# Patient Record
Sex: FEMALE | Race: WHITE | Employment: FULL TIME | URBAN - METROPOLITAN AREA
[De-identification: names, ages, dates, MRNs, and addresses within clinical notes are randomized per-mention and may not be internally consistent; named-entity substitution may affect disease eponyms.]

---

## 2017-02-15 ENCOUNTER — PRE VISIT (OUTPATIENT)
Dept: CARDIOLOGY | Facility: CLINIC | Age: 47
End: 2017-02-15

## 2017-02-22 ENCOUNTER — OFFICE VISIT (OUTPATIENT)
Dept: CARDIOLOGY | Facility: CLINIC | Age: 47
End: 2017-02-22
Attending: INTERNAL MEDICINE
Payer: COMMERCIAL

## 2017-02-22 VITALS
BODY MASS INDEX: 50.02 KG/M2 | WEIGHT: 293 LBS | HEART RATE: 74 BPM | HEIGHT: 64 IN | SYSTOLIC BLOOD PRESSURE: 139 MMHG | DIASTOLIC BLOOD PRESSURE: 79 MMHG

## 2017-02-22 DIAGNOSIS — R00.2 PALPITATIONS: ICD-10-CM

## 2017-02-22 DIAGNOSIS — I10 BENIGN ESSENTIAL HYPERTENSION: ICD-10-CM

## 2017-02-22 DIAGNOSIS — I48.92 ATRIAL FLUTTER, UNSPECIFIED TYPE (H): Primary | ICD-10-CM

## 2017-02-22 PROCEDURE — 93000 ELECTROCARDIOGRAM COMPLETE: CPT | Performed by: INTERNAL MEDICINE

## 2017-02-22 PROCEDURE — 99214 OFFICE O/P EST MOD 30 MIN: CPT | Performed by: INTERNAL MEDICINE

## 2017-02-22 RX ORDER — LEVOTHYROXINE SODIUM 200 UG/1
200 TABLET ORAL DAILY
COMMUNITY
End: 2022-01-12

## 2017-02-22 NOTE — LETTER
2/22/2017    Srikanth Morris MD  [facility]  [address] [place] [address]    RE: Makenzie Blanco       Dear Colleague,    I had the pleasure of seeing Makenzie Blanco in the [facility].    REASON FOR VISIT:  Evaluation of paroxysmal AFib/flutter.      Ms. Mirza is a delightful 47-year-old lady with a history of hypertension, obstructive sleep apnea, morbid obesity and paroxysmal atrial fibrillation/flutter who is here for routine evaluation.      The patient has a family history of hypertrophic cardiomyopathy (her mother, who is actually my patient).  She had an MRI which revealed asymmetric hypertrophy, however no signs of hypertrophic cardiomyopathy.  In 05/2016, she presented with palpitations and was found to have atypical flutter.  The episode terminated after 5 days.      I last saw her in 06/2016.  We started her on atenolol as well as Eliquis.  She was found to have sleep apnea and started on CPAP.      She informs that since last year, she has not had any recurrence of atrial arrhythmia.  She denies any symptoms during this visit such as chest pain, shortness of breath, lightheadedness, near-syncope or syncopal episode.  She seems to be compliant to CPAP machine.      Echocardiogram obtained in 04/2016 showed asymmetric LVH, however, no LVOT obstruction, EF of 60%-65%.  Cardiac MRI revealed a maximal wall thickness of 1.3 cm, and there were no other features compatible with hypertrophic cardiomyopathy.      EKG done here today shows normal sinus rhythm.      Outpatient Encounter Prescriptions as of 2/22/2017   Medication Sig Dispense Refill     levothyroxine (SYNTHROID) 200 MCG tablet Take 200 mcg by mouth daily       atenolol (TENORMIN) 50 MG tablet Take 1.5 tablets (75 mg) by mouth daily (Patient taking differently: Take 50 mg by mouth daily ) 135 tablet 1     apixaban ANTICOAGULANT (ELIQUIS) 5 MG tablet Take 1 tablet (5 mg) by mouth 2  times daily 180 tablet 3     triamterene-hydrochlorothiazide (MAXZIDE-25) 37.5-25 MG per tablet Take 1 tablet by mouth daily       [DISCONTINUED] levothyroxine (SYNTHROID, LEVOTHROID) 112 MCG tablet Take 200 mcg by mouth daily        No facility-administered encounter medications on file as of 2/22/2017.      ASSESSMENT AND PLAN:   1.  Paroxysmal atrial fibrillation/flutter.  Responded well to atenolol.  Blood pressure is well controlled.  Will continue current medical therapy.   2.  Embolic prevention.  CHADS-VASc score of 2.  Continue Eliquis.   3.  Family history of HCM.  MRI did not show any features compatible with hypertrophic cardiomyopathy.   4.  Followup care.  Follow up in clinic with me in a year or earlier as needed.  At that time, we will repeat an echo.     Again, thank you for allowing me to participate in the care of your patient.      Sincerely,    Az Johnson MD     [facility]

## 2017-02-22 NOTE — PROGRESS NOTES
REASON FOR VISIT:  Evaluation of paroxysmal AFib/flutter.      HISTORY OF PRESENT ILLNESS:  Ms. Mirza is a delightful 47-year-old lady with a history of hypertension, obstructive sleep apnea, morbid obesity and paroxysmal atrial fibrillation/flutter who is here for routine evaluation.      The patient has a family history of hypertrophic cardiomyopathy (her mother, who is actually my patient).  She had an MRI which revealed asymmetric hypertrophy, however no signs of hypertrophic cardiomyopathy.  In 05/2016, she presented with palpitations and was found to have atypical flutter.  The episode terminated after 5 days.      I last saw her in 06/2016.  We started her on atenolol as well as Eliquis.  She was found to have sleep apnea and started on CPAP.      She informs that since last year, she has not had any recurrence of atrial arrhythmia.  She denies any symptoms during this visit such as chest pain, shortness of breath, lightheadedness, near-syncope or syncopal episode.  She seems to be compliant to CPAP machine.      Echocardiogram obtained in 04/2016 showed asymmetric LVH, however, no LVOT obstruction, EF of 60%-65%.  Cardiac MRI revealed a maximal wall thickness of 1.3 cm, and there were no other features compatible with hypertrophic cardiomyopathy.      EKG done here today shows normal sinus rhythm.      ASSESSMENT AND PLAN:   1.  Paroxysmal atrial fibrillation/flutter.  Responded well to atenolol.  Blood pressure is well controlled.  Will continue current medical therapy.   2.  Embolic prevention.  CHADS-VASc score of 2.  Continue Eliquis.   3.  Family history of HCM.  MRI did not show any features compatible with hypertrophic cardiomyopathy.   4.  Followup care.  Follow up in clinic with me in a year or earlier as needed.  At that time, we will repeat an echo.         CESAR CAI MD             D: 02/22/2017 09:56   T: 02/22/2017 11:44   MT: MARIA G      Name:     PHILIPP ALLEN   MRN:       [id number]        Account:      [id number]   :      1970           Service Date: 2017      Document: [id number]

## 2017-02-22 NOTE — MR AVS SNAPSHOT
After Visit Summary   2/22/2017    Makenzie Blanco    MRN: [id number]           Patient Information     Date Of Birth          1970        Visit Information        Provider Department      2/22/2017 9:15 AM Az Johnson MD [facility] [place]        Today's Diagnoses     Atrial flutter, unspecified type (H)    -  1    Palpitations        Benign essential hypertension           Follow-ups after your visit        Additional Services     Follow-Up with Electrophysiologist                 Future tests that were ordered for you today     Open Future Orders        Priority Expected Expires Ordered    EKG 12-lead complete w/read - Clinics (to be scheduled) Routine 2/22/2018 3/29/2018 2/22/2017    Echocardiogram Routine 2/22/2018 3/29/2018 2/22/2017    Follow-Up with Electrophysiologist Routine 2/22/2018 7/7/2018 2/22/2017            Who to contact     If you have questions or need follow up information about today's clinic visit or your schedule please contact [facility] directly at [phone].  Normal or non-critical lab and imaging results will be communicated to you by [employer], letter or phone within 4 business days after the clinic has received the results. If you do not hear from us within 7 days, please contact the clinic through [employer] or phone. If you have a critical or abnormal lab result, we will notify you by phone as soon as possible.  Submit refill requests through [employer] or call your pharmacy and they will forward the refill request to us. Please allow 3 business days for your refill to be completed.          Additional Information About Your Visit        MyChart Information     [employer] gives you secure access to your electronic health record. If you see a primary care provider, you can also send messages to your care team and make appointments. If you have questions, please call your primary care  "clinic.  If you do not have a primary care provider, please call [phone] and they will assist you.        Care EveryWhere ID     This is your Care EveryWhere ID. This could be used by other organizations to access your [place] medical records  [id number]        Your Vitals Were     Pulse Height BMI (Body Mass Index)             74 1.613 m (5' 3.5\") 69.31 kg/m2          Blood Pressure from Last 3 Encounters:   02/22/17 139/79   06/23/16 138/84   05/25/16 130/70    Weight from Last 3 Encounters:   02/22/17 (!) 180.3 kg (397 lb 8 oz)   06/23/16 (!) 176.9 kg (389 lb 14.4 oz)   05/25/16 (!) 173.3 kg (382 lb)              We Performed the Following     EKG 12-lead complete w/read - Clinics     Follow-Up with Electrophysiologist          Today's Medication Changes          These changes are accurate as of: 2/22/17  9:57 AM.  If you have any questions, ask your nurse or doctor.               These medicines have changed or have updated prescriptions.        Dose/Directions    atenolol 50 MG tablet   Commonly known as:  TENORMIN   This may have changed:  how much to take   Used for:  Atrial flutter, unspecified type (H)        Dose:  75 mg   Take 1.5 tablets (75 mg) by mouth daily   Quantity:  135 tablet   Refills:  1                Primary Care Provider Office Phone # Fax #    Srikanth Morris [phone]       [facility] [address]  [facility] [address]        Thank you!     Thank you for choosing [facility] [place]  for your care. Our goal is always to provide you with excellent care. Hearing back from our patients is one way we can continue to improve our services. Please take a few minutes to complete the written survey that you may receive in the mail after your visit with us. Thank you!             Your Updated Medication List - Protect others around you: Learn how to safely use, store and throw away your medicines at " www.disposemymeds.org.          This list is accurate as of: 2/22/17  9:57 AM.  Always use your most recent med list.                   Brand Name Dispense Instructions for use    apixaban ANTICOAGULANT 5 MG tablet    ELIQUIS    180 tablet    Take 1 tablet (5 mg) by mouth 2 times daily       atenolol 50 MG tablet    TENORMIN    135 tablet    Take 1.5 tablets (75 mg) by mouth daily       SYNTHROID 200 MCG tablet   Generic drug:  levothyroxine      Take 200 mcg by mouth daily       triamterene-hydrochlorothiazide 37.5-25 MG per tablet    MAXZIDE-25     Take 1 tablet by mouth daily

## 2017-02-22 NOTE — PROGRESS NOTES
"HPI and Plan:   See dictation  [id number]    Physical Exam:  Vitals: /79 (BP Location: Other (Comment), Patient Position: Chair, Cuff Size: Adult Large)  Pulse 74  Ht 1.613 m (5' 3.5\")  Wt (!) 180.3 kg (397 lb 8 oz)  BMI 69.31 kg/m2    Constitutional:  AAO x3.  Pt is in NAD.  HEAD: normocephalic.  SKIN: Skin normal color, texture and turgor with no lesions or eruptions.  Eyes: PERRL, EOMI.  ENT:  Supple, normal JVP. No lymphadenopathy or thyroid enlargement.  Chest:  CTAB.  Cardiac:   RRR, normal  S1 and S2.  No murmurs rubs or gallop.    Abdomen:  Normal BS.  Soft, non-tender and non-distended.  No rebound or guarding.    Extremities:  Pedious pulses palpable B/L.  No LE edema noticed.   Neurological: Strength and sensation grossly symmetric and intact throughout.       CURRENT MEDICATIONS:  Current Outpatient Prescriptions   Medication Sig Dispense Refill     levothyroxine (SYNTHROID) 200 MCG tablet Take 200 mcg by mouth daily       atenolol (TENORMIN) 50 MG tablet Take 1.5 tablets (75 mg) by mouth daily (Patient taking differently: Take 50 mg by mouth daily ) 135 tablet 1     apixaban ANTICOAGULANT (ELIQUIS) 5 MG tablet Take 1 tablet (5 mg) by mouth 2 times daily 180 tablet 3     triamterene-hydrochlorothiazide (MAXZIDE-25) 37.5-25 MG per tablet Take 1 tablet by mouth daily       [DISCONTINUED] levothyroxine (SYNTHROID, LEVOTHROID) 112 MCG tablet Take 200 mcg by mouth daily          ALLERGIES   No Known Allergies    PAST MEDICAL HISTORY:  Past Medical History   Diagnosis Date     Anxiety      Atrial flutter (H)      On Holter 5/24/2016     Cardiomyopathy (H)      Chronic ear infection      HTN (hypertension)      Hypothyroid      LVH (left ventricular hypertrophy)      Morbid obesity (H)      MILTON (obstructive sleep apnea)      CPAP     Palpitations        PAST SURGICAL HISTORY:  No past surgical history on file.    FAMILY HISTORY:  Family History   Problem Relation Age of Onset     Hypertension Mother  "       SOCIAL HISTORY:  Social History     Social History     Marital status:      Spouse name: N/A     Number of children: N/A     Years of education: N/A     Social History Main Topics     Smoking status: Never Smoker     Smokeless tobacco: Never Used     Alcohol use 0.0 oz/week     0 Standard drinks or equivalent per week      Comment: occasional      Drug use: No     Sexual activity: Not on file     Other Topics Concern     Caffeine Concern Yes     1 cups of coffee a day      Special Diet No     Exercise No     Social History Narrative       Review of Systems:  Skin:  Positive for rash   Eyes:  Positive for glasses  ENT:  Negative    Respiratory:  Positive for dyspnea on exertion;sleep apnea;CPAP  Cardiovascular:    edema;Positive for  Gastroenterology: Negative    Genitourinary:  Negative    Musculoskeletal:  Positive for back pain;arthritis;joint pain;nocturnal cramping  Neurologic:  Negative    Psychiatric:  Positive for anxiety  Heme/Lymph/Imm:  Negative    Endocrine:  Positive for thyroid disorder      Recent Lab Results:  LIPID RESULTS:  Lab Results   Component Value Date    CHOL 160 03/30/2015    HDL 52 03/30/2015    LDL 88 03/30/2015    TRIG 100 03/30/2015       LIVER ENZYME RESULTS:  Lab Results   Component Value Date    AST 18 12/16/2015    ALT 26 12/16/2015       CBC RESULTS:  Lab Results   Component Value Date    HGB 12.3 05/05/2006       BMP RESULTS:  Lab Results   Component Value Date     12/16/2015    POTASSIUM 5.1 12/16/2015    CHLORIDE 103 12/16/2015    GLC 96 12/16/2015    BUN 13 12/16/2015    CR 0.8 12/16/2015    GERMAN 9.1 12/16/2015        A1C RESULTS:  No results found for: A1C    INR RESULTS:  No results found for: INR      ECHOCARDIOGRAM  Recent Results (from the past 8760 hour(s))   Echocardiogram Limited    Narrative    Interpretation Summary                    [facility]  Echocardiography Laboratory  [address] [place] [address]        Name:  PHILIPP ALLEN  MRN: [id number]  : 1970  Study Date: 2016 09:05 AM  Age: 46 yrs  Gender: Female  Patient Location: [facility] [place]  Reason For Study:     Ordering Physician: AFTAB PARKER  Referring Physician: Srikanth Morris  Performed By: Amalia Ceballos RDCS     BSA: 2.6 m2  Height: 63 in  Weight: 385 lb  HR: 72  BP: 124/76 mmHg  ______________________________________________________________________________        Procedure  Limited Echo Adult.  ______________________________________________________________________________     Interpretation Summary     Compared to prior study, there is no significant change.  ______________________________________________________________________________           Left Ventricle  The left ventricle is normal in size. Left ventricular hypertrophy:  asymmetric with no LVOT obstruction. The visual ejection fraction is  estimated at 60-65%.     Right Ventricle  The right ventricle is normal in structure, function and size. The right  ventricular systolic function is normal.  Atria  Normal left atrial size. Right atrial size is normal.     Mitral Valve  The mitral valve leaflets appear normal. There is no evidence of stenosis,  fluttering, or prolapse.     Tricuspid Valve  Normal tricuspid valve. Right ventricular systolic pressure could not be  approximated due to inadequate tricuspid regurgitation.     Aortic Valve  Normal tricuspid aortic valve.     Pulmonic Valve  The pulmonic valve is not well visualized.     Vessels  The aortic root is normal size.  Pericardium  There is no pericardial effusion.     Rhythm  The rhythm was normal sinus.     ______________________________________________________________________________                    ______________________________________________________________________________        Report approved by: Nir Solo 2016 01:58 PM      ECHO COMPLETE WITH OPTISON    Narrative    Interpretation Summary                     [facility]  Echocardiography Laboratory  [address]  [place] [address]        Name: PHILIPP ALLEN  MRN: [id number]  : 1970  Study Date: 2016 09:05 AM  Age: 46 yrs  Gender: Female  [place] [facility]  Reason For Study: Essential (primary) hypertension  Ordering Physician: AFTAB PARKER  Referring Physician: Srikanth Morris  Performed By: Mildred Jacob,     BSA: 2.5 m2  Height: 63 in  Weight: 375 lb  HR: 83  BP: 140/70 mmHg  ______________________________________________________________________________        Procedure  Complete Echo Adult. Contrast Optison.  ______________________________________________________________________________     Interpretation Summary     The visual ejection fraction is estimated at 60-65%.  The transmitral spectral Doppler flow pattern is normal for age.  Left ventricular hypertrophy: asymmetric with no LVOT obstruction  The study was technically adequate. Contrast was used without apparent  complications. There is no comparison study available.  ______________________________________________________________________________           Left Ventricle  The left ventricle is normal in size. Left ventricular hypertrophy:  asymmetric with no LVOT obstruction. The visual ejection fraction is  estimated at 60-65%. The transmitral spectral Doppler flow pattern is normal  for age.     Right Ventricle  The right ventricle is normal in structure, function and size.  Atria  Normal left atrial size. Right atrial size is normal.     Mitral Valve  The mitral valve leaflets appear normal. There is no evidence of stenosis,  fluttering, or prolapse.     Tricuspid Valve  The tricuspid valve is not well visualized, but is grossly normal.     Aortic Valve  Normal tricuspid aortic valve.     Pulmonic Valve  The pulmonic valve is not well visualized. There is no pulmonic valvular  stenosis.     Vessels  The aortic root is normal size. Normal size  ascending aorta. The inferior  vena cava is not dilated.  ______________________________________________________________________________     MMode/2D Measurements & Calculations  IVSd: 1.4 cm  LVIDd: 5.1 cm  LVIDs: 3.0 cm  LVPWd: 1.1 cm  FS: 42.0 %  EDV(Teich): 123.5 ml  ESV(Teich): 33.7 ml  LV mass(C)d: 247.8 grams  Ao root diam: 3.2 cm  LA dimension: 3.6 cm  asc Aorta Diam: 3.1 cm  LA/Ao: 1.1  LA Volume (BP): 29.0 ml  LA Volume Index (BP): 11.5 ml/m2           Doppler Measurements & Calculations  MV E max genny: 121.9 cm/sec  MV A max genny: 69.6 cm/sec  MV E/A: 1.8  MV dec slope: 665.9 cm/sec2  PA acc time: 0.09 sec  Lateral E/e': 9.2  Medial E/e': 8.4           ______________________________________________________________________________        Report approved by: Nir Solo 02/24/2016 10:46 AM            No orders of the defined types were placed in this encounter.    Orders Placed This Encounter   Medications     levothyroxine (SYNTHROID) 200 MCG tablet     Sig: Take 200 mcg by mouth daily     Medications Discontinued During This Encounter   Medication Reason     levothyroxine (SYNTHROID, LEVOTHROID) 112 MCG tablet Medication Reconciliation Clean Up         Encounter Diagnoses   Name Primary?     Atrial flutter, unspecified type (H)      Palpitations      Benign essential hypertension          CC  Srikanth Morris MD  [facility]  [address] [place] [address]

## 2017-06-10 ENCOUNTER — HEALTH MAINTENANCE LETTER (OUTPATIENT)
Age: 47
End: 2017-06-10

## 2017-06-21 DIAGNOSIS — I48.92 ATRIAL FLUTTER, UNSPECIFIED TYPE (H): ICD-10-CM

## 2017-06-21 RX ORDER — ATENOLOL 50 MG/1
75 TABLET ORAL DAILY
Qty: 135 TABLET | Refills: 3 | Status: ON HOLD | OUTPATIENT
Start: 2017-06-21 | End: 2017-12-11

## 2017-09-07 DIAGNOSIS — I10 BENIGN ESSENTIAL HYPERTENSION: ICD-10-CM

## 2017-09-07 DIAGNOSIS — I48.92 ATRIAL FLUTTER, UNSPECIFIED TYPE (H): ICD-10-CM

## 2017-09-07 DIAGNOSIS — R00.2 PALPITATIONS: ICD-10-CM

## 2017-12-11 ENCOUNTER — APPOINTMENT (OUTPATIENT)
Dept: ULTRASOUND IMAGING | Facility: CLINIC | Age: 47
End: 2017-12-11
Attending: EMERGENCY MEDICINE
Payer: COMMERCIAL

## 2017-12-11 ENCOUNTER — HOSPITAL ENCOUNTER (OUTPATIENT)
Facility: CLINIC | Age: 47
Setting detail: OBSERVATION
Discharge: HOME | End: 2017-12-11
Attending: EMERGENCY MEDICINE | Admitting: INTERNAL MEDICINE
Payer: COMMERCIAL

## 2017-12-11 VITALS
RESPIRATION RATE: 18 BRPM | HEART RATE: 76 BPM | BODY MASS INDEX: 48.82 KG/M2 | DIASTOLIC BLOOD PRESSURE: 73 MMHG | WEIGHT: 293 LBS | SYSTOLIC BLOOD PRESSURE: 135 MMHG | TEMPERATURE: 97.9 F | HEIGHT: 65 IN | OXYGEN SATURATION: 94 %

## 2017-12-11 DIAGNOSIS — I48.92 ATRIAL FLUTTER, UNSPECIFIED TYPE (H): ICD-10-CM

## 2017-12-11 DIAGNOSIS — I10 BENIGN ESSENTIAL HYPERTENSION: ICD-10-CM

## 2017-12-11 DIAGNOSIS — R00.2 PALPITATIONS: ICD-10-CM

## 2017-12-11 DIAGNOSIS — K80.20 SYMPTOMATIC CHOLELITHIASIS: ICD-10-CM

## 2017-12-11 PROBLEM — R10.9 ABDOMINAL PAIN: Status: ACTIVE | Noted: 2017-12-11

## 2017-12-11 LAB
ALBUMIN SERPL-MCNC: 3.4 G/DL (ref 3.4–5)
ALP SERPL-CCNC: 88 U/L (ref 40–150)
ALT SERPL W P-5'-P-CCNC: 21 U/L (ref 0–50)
ANION GAP SERPL CALCULATED.3IONS-SCNC: 8 MMOL/L (ref 3–14)
AST SERPL W P-5'-P-CCNC: 15 U/L (ref 0–45)
BASOPHILS # BLD AUTO: 0.1 10E9/L (ref 0–0.2)
BASOPHILS NFR BLD AUTO: 0.5 %
BILIRUB SERPL-MCNC: 0.3 MG/DL (ref 0.2–1.3)
BUN SERPL-MCNC: 14 MG/DL (ref 7–30)
CALCIUM SERPL-MCNC: 9.4 MG/DL (ref 8.5–10.1)
CHLORIDE SERPL-SCNC: 102 MMOL/L (ref 94–109)
CO2 SERPL-SCNC: 24 MMOL/L (ref 20–32)
CREAT SERPL-MCNC: 0.7 MG/DL (ref 0.52–1.04)
DIFFERENTIAL METHOD BLD: ABNORMAL
EOSINOPHIL # BLD AUTO: 0.1 10E9/L (ref 0–0.7)
EOSINOPHIL NFR BLD AUTO: 0.5 %
ERYTHROCYTE [DISTWIDTH] IN BLOOD BY AUTOMATED COUNT: 14.3 % (ref 10–15)
GFR SERPL CREATININE-BSD FRML MDRD: 90 ML/MIN/1.7M2
GLUCOSE SERPL-MCNC: 126 MG/DL (ref 70–99)
HCT VFR BLD AUTO: 39.7 % (ref 35–47)
HGB BLD-MCNC: 13 G/DL (ref 11.7–15.7)
IMM GRANULOCYTES # BLD: 0.1 10E9/L (ref 0–0.4)
IMM GRANULOCYTES NFR BLD: 0.6 %
INR PPP: 1.29 (ref 0.86–1.14)
LIPASE SERPL-CCNC: 142 U/L (ref 73–393)
LYMPHOCYTES # BLD AUTO: 1.8 10E9/L (ref 0.8–5.3)
LYMPHOCYTES NFR BLD AUTO: 14.6 %
MCH RBC QN AUTO: 26.5 PG (ref 26.5–33)
MCHC RBC AUTO-ENTMCNC: 32.7 G/DL (ref 31.5–36.5)
MCV RBC AUTO: 81 FL (ref 78–100)
MONOCYTES # BLD AUTO: 0.7 10E9/L (ref 0–1.3)
MONOCYTES NFR BLD AUTO: 5.3 %
NEUTROPHILS # BLD AUTO: 9.5 10E9/L (ref 1.6–8.3)
NEUTROPHILS NFR BLD AUTO: 78.5 %
NRBC # BLD AUTO: 0 10*3/UL
NRBC BLD AUTO-RTO: 0 /100
PLATELET # BLD AUTO: 397 10E9/L (ref 150–450)
POTASSIUM SERPL-SCNC: 3.8 MMOL/L (ref 3.4–5.3)
PROT SERPL-MCNC: 8.4 G/DL (ref 6.8–8.8)
RBC # BLD AUTO: 4.9 10E12/L (ref 3.8–5.2)
SODIUM SERPL-SCNC: 134 MMOL/L (ref 133–144)
WBC # BLD AUTO: 12.2 10E9/L (ref 4–11)

## 2017-12-11 PROCEDURE — 25000132 ZZH RX MED GY IP 250 OP 250 PS 637: Performed by: HOSPITALIST

## 2017-12-11 PROCEDURE — 96374 THER/PROPH/DIAG INJ IV PUSH: CPT

## 2017-12-11 PROCEDURE — 96376 TX/PRO/DX INJ SAME DRUG ADON: CPT

## 2017-12-11 PROCEDURE — 99235 HOSP IP/OBS SAME DATE MOD 70: CPT | Performed by: INTERNAL MEDICINE

## 2017-12-11 PROCEDURE — 85025 COMPLETE CBC W/AUTO DIFF WBC: CPT | Performed by: EMERGENCY MEDICINE

## 2017-12-11 PROCEDURE — G0378 HOSPITAL OBSERVATION PER HR: HCPCS

## 2017-12-11 PROCEDURE — 99285 EMERGENCY DEPT VISIT HI MDM: CPT | Mod: 25

## 2017-12-11 PROCEDURE — 25000128 H RX IP 250 OP 636: Performed by: EMERGENCY MEDICINE

## 2017-12-11 PROCEDURE — 80053 COMPREHEN METABOLIC PANEL: CPT | Performed by: EMERGENCY MEDICINE

## 2017-12-11 PROCEDURE — 83690 ASSAY OF LIPASE: CPT | Performed by: EMERGENCY MEDICINE

## 2017-12-11 PROCEDURE — 96375 TX/PRO/DX INJ NEW DRUG ADDON: CPT

## 2017-12-11 PROCEDURE — 25000132 ZZH RX MED GY IP 250 OP 250 PS 637: Performed by: INTERNAL MEDICINE

## 2017-12-11 PROCEDURE — 85610 PROTHROMBIN TIME: CPT | Performed by: EMERGENCY MEDICINE

## 2017-12-11 PROCEDURE — 25000128 H RX IP 250 OP 636

## 2017-12-11 PROCEDURE — 76705 ECHO EXAM OF ABDOMEN: CPT

## 2017-12-11 PROCEDURE — 99203 OFFICE O/P NEW LOW 30 MIN: CPT | Performed by: SURGERY

## 2017-12-11 PROCEDURE — 25000128 H RX IP 250 OP 636: Performed by: INTERNAL MEDICINE

## 2017-12-11 RX ORDER — TRIAMTERENE/HYDROCHLOROTHIAZID 37.5-25 MG
1 TABLET ORAL DAILY
COMMUNITY
End: 2020-06-08

## 2017-12-11 RX ORDER — NALOXONE HYDROCHLORIDE 0.4 MG/ML
.1-.4 INJECTION, SOLUTION INTRAMUSCULAR; INTRAVENOUS; SUBCUTANEOUS
Status: DISCONTINUED | OUTPATIENT
Start: 2017-12-11 | End: 2017-12-11 | Stop reason: HOSPADM

## 2017-12-11 RX ORDER — AMOXICILLIN 250 MG
1 CAPSULE ORAL 2 TIMES DAILY PRN
Status: DISCONTINUED | OUTPATIENT
Start: 2017-12-11 | End: 2017-12-11 | Stop reason: HOSPADM

## 2017-12-11 RX ORDER — HYDROMORPHONE HYDROCHLORIDE 4 MG/1
4 TABLET ORAL EVERY 4 HOURS PRN
Qty: 12 TABLET | Refills: 0 | Status: SHIPPED | OUTPATIENT
Start: 2017-12-11 | End: 2018-09-05

## 2017-12-11 RX ORDER — MORPHINE SULFATE 4 MG/ML
4 INJECTION, SOLUTION INTRAMUSCULAR; INTRAVENOUS ONCE
Status: COMPLETED | OUTPATIENT
Start: 2017-12-11 | End: 2017-12-11

## 2017-12-11 RX ORDER — DOCUSATE SODIUM 100 MG/1
100 CAPSULE, LIQUID FILLED ORAL 2 TIMES DAILY
Status: DISCONTINUED | OUTPATIENT
Start: 2017-12-11 | End: 2017-12-11 | Stop reason: HOSPADM

## 2017-12-11 RX ORDER — OXYCODONE HYDROCHLORIDE 5 MG/1
5-10 TABLET ORAL
Status: DISCONTINUED | OUTPATIENT
Start: 2017-12-11 | End: 2017-12-11 | Stop reason: HOSPADM

## 2017-12-11 RX ORDER — ATENOLOL 25 MG/1
75 TABLET ORAL DAILY
Status: DISCONTINUED | OUTPATIENT
Start: 2017-12-11 | End: 2017-12-11

## 2017-12-11 RX ORDER — ONDANSETRON 4 MG/1
4 TABLET, ORALLY DISINTEGRATING ORAL EVERY 6 HOURS PRN
Status: DISCONTINUED | OUTPATIENT
Start: 2017-12-11 | End: 2017-12-11 | Stop reason: HOSPADM

## 2017-12-11 RX ORDER — AMOXICILLIN 250 MG
2 CAPSULE ORAL 2 TIMES DAILY PRN
Status: DISCONTINUED | OUTPATIENT
Start: 2017-12-11 | End: 2017-12-11 | Stop reason: HOSPADM

## 2017-12-11 RX ORDER — ONDANSETRON 2 MG/ML
INJECTION INTRAMUSCULAR; INTRAVENOUS
Status: COMPLETED
Start: 2017-12-11 | End: 2017-12-11

## 2017-12-11 RX ORDER — ONDANSETRON 2 MG/ML
4 INJECTION INTRAMUSCULAR; INTRAVENOUS EVERY 6 HOURS PRN
Status: DISCONTINUED | OUTPATIENT
Start: 2017-12-11 | End: 2017-12-11 | Stop reason: HOSPADM

## 2017-12-11 RX ORDER — ATENOLOL 50 MG/1
50 TABLET ORAL DAILY
COMMUNITY
End: 2018-07-03

## 2017-12-11 RX ORDER — LEVOTHYROXINE SODIUM 100 UG/1
200 TABLET ORAL DAILY
Status: DISCONTINUED | OUTPATIENT
Start: 2017-12-11 | End: 2017-12-11 | Stop reason: HOSPADM

## 2017-12-11 RX ORDER — ACETAMINOPHEN 650 MG/1
650 SUPPOSITORY RECTAL EVERY 4 HOURS PRN
Status: DISCONTINUED | OUTPATIENT
Start: 2017-12-11 | End: 2017-12-11 | Stop reason: HOSPADM

## 2017-12-11 RX ORDER — HYDROMORPHONE HYDROCHLORIDE 2 MG/1
2-4 TABLET ORAL
Status: DISCONTINUED | OUTPATIENT
Start: 2017-12-11 | End: 2017-12-11 | Stop reason: HOSPADM

## 2017-12-11 RX ORDER — ATENOLOL 25 MG/1
50 TABLET ORAL DAILY
Status: DISCONTINUED | OUTPATIENT
Start: 2017-12-11 | End: 2017-12-11 | Stop reason: HOSPADM

## 2017-12-11 RX ORDER — ACETAMINOPHEN 325 MG/1
650 TABLET ORAL EVERY 4 HOURS PRN
Status: DISCONTINUED | OUTPATIENT
Start: 2017-12-11 | End: 2017-12-11 | Stop reason: HOSPADM

## 2017-12-11 RX ORDER — AMPICILLIN AND SULBACTAM 2; 1 G/1; G/1
3 INJECTION, POWDER, FOR SOLUTION INTRAMUSCULAR; INTRAVENOUS EVERY 6 HOURS
Status: DISCONTINUED | OUTPATIENT
Start: 2017-12-11 | End: 2017-12-11 | Stop reason: HOSPADM

## 2017-12-11 RX ORDER — HYDROMORPHONE HYDROCHLORIDE 1 MG/ML
0.3 INJECTION, SOLUTION INTRAMUSCULAR; INTRAVENOUS; SUBCUTANEOUS
Status: DISCONTINUED | OUTPATIENT
Start: 2017-12-11 | End: 2017-12-11 | Stop reason: HOSPADM

## 2017-12-11 RX ORDER — ONDANSETRON 2 MG/ML
4 INJECTION INTRAMUSCULAR; INTRAVENOUS ONCE
Status: COMPLETED | OUTPATIENT
Start: 2017-12-11 | End: 2017-12-11

## 2017-12-11 RX ADMIN — MORPHINE SULFATE 4 MG: 4 INJECTION, SOLUTION INTRAMUSCULAR; INTRAVENOUS at 04:10

## 2017-12-11 RX ADMIN — ONDANSETRON 4 MG: 2 INJECTION INTRAMUSCULAR; INTRAVENOUS at 05:40

## 2017-12-11 RX ADMIN — LEVOTHYROXINE SODIUM 200 MCG: 100 TABLET ORAL at 09:02

## 2017-12-11 RX ADMIN — AMPICILLIN SODIUM AND SULBACTAM SODIUM 3 G: 2; 1 INJECTION, POWDER, FOR SOLUTION INTRAMUSCULAR; INTRAVENOUS at 12:37

## 2017-12-11 RX ADMIN — ATENOLOL 50 MG: 25 TABLET ORAL at 09:50

## 2017-12-11 RX ADMIN — MORPHINE SULFATE 4 MG: 4 INJECTION INTRAVENOUS at 05:39

## 2017-12-11 RX ADMIN — ONDANSETRON 4 MG: 2 INJECTION INTRAMUSCULAR; INTRAVENOUS at 08:47

## 2017-12-11 RX ADMIN — AMPICILLIN SODIUM AND SULBACTAM SODIUM 3 G: 2; 1 INJECTION, POWDER, FOR SOLUTION INTRAMUSCULAR; INTRAVENOUS at 07:32

## 2017-12-11 RX ADMIN — HYDROMORPHONE HYDROCHLORIDE 2 MG: 2 TABLET ORAL at 14:19

## 2017-12-11 RX ADMIN — HYDROMORPHONE HYDROCHLORIDE 2 MG: 2 TABLET ORAL at 18:35

## 2017-12-11 RX ADMIN — Medication 0.3 MG: at 09:10

## 2017-12-11 ASSESSMENT — ENCOUNTER SYMPTOMS
NERVOUS/ANXIOUS: 1
NAUSEA: 0
BACK PAIN: 1
ABDOMINAL PAIN: 1

## 2017-12-11 ASSESSMENT — PAIN DESCRIPTION - DESCRIPTORS: DESCRIPTORS: ACHING

## 2017-12-11 NOTE — ED NOTES
Went to bed  Middle back pain  This was at 10 pm  c pap on   Has been up and down all night felt full and bloated last night  After having pizza and ice cream  Pain mid back around to front mostly on the right  Side   Here for eval

## 2017-12-11 NOTE — ED PROVIDER NOTES
History     Chief Complaint:  Abdominal Pain and Back Pain    HPI   Makenzie Blanco is a 47 year old female on Eliquis for atrial flutter who presents to the emergency department today for evaluation of abdominal pain and back pain. The patient reports that at 1730 last night, 12/01/2017, she had pizza for dinner and then had ice cream for desert. She then felt very full and bloated and then developed some indigestion and abdominal pain. The patient reports that she took Tagamet but this did not relieve her pain. When the patient laid down to go to bed at 2200 she experienced a stabbing radiation of her abdominal pain to her back by way of her right upper quadrant. With her discomfort she was unable to sleep satisfactorily so she decided to come here to the emergency department. Here in the emergency department the patient reports that she still has abdominal pain that is located mostly on the right side. She also still has stabbing back pain but denies any nausea or chest pain. The patient notes that she is anxious. She did not have any alcohol last night.     Allergies:  No Known Drug Allergies     Medications:    Eliquis  Tenormin  Levothyroxine     Past Medical History:    Anxiety  Atrial flutter    Cardiomyopathy    Chronic ear infection   HTN (hypertension)   Hypothyroid   LVH (left ventricular hypertrophy)   Morbid obesity   MILTON (obstructive sleep apnea)   Palpitations    Past Surgical History:    History reviewed. No pertinent surgical history.    Family History:    Mother: Hypertension, Hepatitis C    Social History:  Smoking Status: Never Smoker  Smokeless Tobacco: Never Used  Alcohol Use: Positive  Marital Status:       Review of Systems   Cardiovascular: Negative for chest pain.   Gastrointestinal: Positive for abdominal pain. Negative for nausea.   Musculoskeletal: Positive for back pain.   Psychiatric/Behavioral: The patient is nervous/anxious.    All other systems reviewed and are  "negative.    Physical Exam     Patient Vitals for the past 24 hrs:   BP Temp Temp src Pulse Resp SpO2 Height Weight   12/11/17 0458 - - - - - 93 % - -   12/11/17 0457 146/66 - - - - - - -   12/11/17 0308 171/81 98  F (36.7  C) Temporal 80 17 97 % 1.651 m (5' 5\") (!) 167.8 kg (370 lb)     Physical Exam  Constitutional: Patient appears well-developed and well-nourished. Patient is in mild distress  HENT:                         Head: No external signs of trauma noted.                        Eyes: Conjunctivae are normal. Pupils are equal, round, and reactive to light.   Cardiovascular:                         Normal rate, regular rhythm and normal heart sounds.                          Exam reveals no friction rub.                          No murmur heard.  Pulmonary/Chest:                         Effort normal and breath sounds normal.                         No respiratory distress.                         There are no wheezes.                         There are no rales.   Abdominal:                         Soft.                         Bowel sounds normal.                         There is no distension.                         There is no RLQ, LLQ, or LUQ tenderness. There is mild RUQ and epigastric TTP                        There is no rebound or guarding.   Musculoskeletal:                         Normal range of motion.                         Normal Tone                        No CVA tenderness  Neurological: Patient is alert and oriented to person, place, and time.   Skin: Skin is warm and dry. Patient is not diaphoretic. No abdominal or flank bruising noted.    Emergency Department Course     Imaging:  Radiology findings were communicated with the patient who voiced understanding of the findings.    Abdomen US, limited (RUQ only)  1. Cholelithiasis without sonographic evidence of cholecystitis.  2. Fatty infiltration of the liver.  Reading per radiology    Laboratory:  Laboratory findings were communicated " with the patient who voiced understanding of the findings.    CBC: WBC 12.2 (H), HGB 13.0,   CMP: Glucose 126 (H) o/w WNL (Creatinine 0.70)  Lipase: 142     Interventions:    Medications   morphine (PF) injection 4 mg (4 mg Intravenous Given 12/11/17 0410)   morphine (PF) injection 4 mg (4 mg Intravenous Given 12/11/17 0539)   ondansetron (ZOFRAN) injection 4 mg (4 mg Intravenous Given 12/11/17 0540)     Emergency Department Course:    0313 Nursing notes and vitals reviewed.    0335 I performed an exam of the patient as documented above.     0343 IV was inserted and blood was drawn for laboratory testing, results above.     0432 The patient was sent for a Abdomen US, limited (RUQ only) while in the emergency department, results above.      0532 I spoke with Dr. Walsh of general surgery regarding patient's presentation, findings, and plan of care.     0543 I spoke with Dr. Valera of the hospitalist service who will admit the patient for further medical care.    0545 I personally reviewed the laboratory and imaging results with the patient and answered all related questions prior to admission.    Impression & Plan      Medical Decision Making:  Makenzie Blanco is a 47 year old female who presents to the emergency department today for evaluation of abdominal pain. Please see the HPI and exam for specifics. Given the patient's description of her symptoms and location, this would fit with something gastrointestinal. She describes epigastric pain and burning that radiated to her back around the way of the right upper quadrant. Her lab work was normal aside from a slight elevation in her WBC count. An ultrasound demonstrated cholelithiasis without cholecystitis. The patient still describes RUQ pain, even though there is only minimal pressure on repeat exam. I discussed the case with general surgery who states that the patient should be off Eliquis for at least 24 hours before surgery would be considered. I  discussed the case with the hospitalist who accepts the patient for admission.       Diagnosis:    ICD-10-CM    1. Symptomatic cholelithiasis K80.20      Disposition:   Admitted      Scribe Disclosure:  I, Michael Haynes, am serving as a scribe at 3:22 AM on 12/11/2017 to document services personally performed by Bernabe Cedillo DO, based on my observations and the provider's statements to me.    [facility] EMERGENCY DEPARTMENT       Bernabe Cedillo DO  12/11/17 0544

## 2017-12-11 NOTE — CONSULTS
PRIMARY CARE PHYSICIAN:  Srikanth Morris MD      REASON FOR CONSULTATION:  Biliary colic.      HISTORY OF PRESENT ILLNESS:  Makenzie Blanco is a 47-year-old female, with no antecedent history of biliary tract disease, who came to the ER overnight with abdominal pain.  She states that she had some mid back pain while trying to sleep, and was not able to relieve this with activity or adjustments in the bed.  She had some mild indigestion as well, and took some antacids without relief of her symptoms.  Because of the persistence of pain, which eventually went to the epigastrium and right upper quadrant, the patient sought evaluation in the ER.      Here, she was found to have a mild leukocytosis, and an ultrasound which showed gallstones without signs of cholecystitis.  This morning, the patient states that her pain is a little bit better, but not completely absent.  She denies any jaundice or icterus or diarrhea during the course of this illness.  She is on chronic anticoagulation for atrial fibrillation, she has been on this for a year, and does not have any specific sequelae of her atrial fibrillation, i.e., no thromboembolic events.      PAST MEDICAL AND SURGICAL HISTORY:  Anxiety, atrial flutter, cardiomyopathy, hypertension, hypothyroidism, morbid obesity, obstructive sleep apnea.  She has had no previous abdominal surgeries.  She has had a prior tonsillectomy, and D&C.      CURRENT MEDICATIONS:  At the time of admission, the patient was on Eliquis, atenolol, levothyroxine.  The patient is also on hydrochlorothiazide, per report.      ALLERGIES:  The patient has no recognized drug allergies.      SOCIAL HISTORY:  The patient is .  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  She is not a smoker.      PHYSICAL EXAM:   VITAL SIGNS:  Ms. Blanco is afebrile; temperature this morning is 96.8.  Pulse is 76 with blood pressure of 159/66, respiratory rate is 18 with 94% saturations on room air.   GENERAL:   She is generally alert, appropriate, nontoxic-appearing overall.   HEENT:  She has no appreciable scleral icterus or jaundice.   HEART:  Sounds are currently regular without murmurs.   LUNGS:  Breath sounds are without wheezes or crackles.   ABDOMEN:  Soft, without distension.  She has some mild epigastric tenderness and right upper quadrant tenderness, but no Raza sign on deep inspiration.      LABORATORY EXAMS:  Notable for white blood cell count of 12.2 thousand, hemoglobin 13.0.  LFTs are all normal, as is her lipase on admission.  INR is 1.29.      IMAGING:  I personally reviewed the patient's ultrasound from the ER; this showed gallstones without wall thickening or edema, and there is no duct dilation.      IMPRESSION AND RECOMMENDATIONS:  This is a 47-year-old female with newly diagnosed symptomatic cholelithiasis without overt suggestion of cholecystitis.  The patient feels slightly better this morning, and given the fact she is on Eliquis, she is not an appropriate surgical candidate at this moment in time.  I have offered cholecystectomy in the near future, but after referring to prescribing guidelines, I think it would be safest for the patient to be clear of her Eliquis for at least 48 hours prior to surgery.  This would put us at Wednesday of this week.  I will place her on a clear liquid diet, hopefully she can return home, and again continue to hold her Eliquis until the scheduled surgery.  We will discuss timing with our surgery scheduler this morning regarding the actual timing of surgery.      Thank you very much for this consultation.         JOSE RAFAEL ALVAREZ MD             D: 2017 08:04   T: 2017 08:31   MT: [id number]      Name:     PHILIPP ALLEN   MRN:      [id number]        Account:       [id number]   :      1970           Consult Date:  2017      Document: [id number]       cc: Srikanth Morris MD

## 2017-12-11 NOTE — PHARMACY-ADMISSION MEDICATION HISTORY
Admission medication history interview status for this patient is complete. See [facility] admission navigator for allergy information, prior to admission medications and immunization status.     Medication history interview source(s):Patient  Medication history resources (including written lists, pill bottles, clinic record):None  Primary pharmacy: [facility] #[id number] - [place], [place] - [address] [address].    Changes made to PTA medication list:  Added: triamterene/HCTZ  Deleted: none  Changed: atenolol dose (75 mg --> 50 mg)    Actions taken by pharmacist (provider contacted, etc):None   Additional medication history information: patient has Rx for atenolol 75 mg, but is currently taking only 50 mg  Medication reconciliation/reorder completed by provider prior to medication history? No  Do you take OTC medications (eg tylenol, ibuprofen, fish oil, eye/ear drops, etc)? No    Prior to Admission medications    Medication Sig Last Dose Taking? Auth Provider   triamterene-hydrochlorothiazide (MAXZIDE-25) 37.5-25 MG per tablet Take 1 tablet by mouth daily 12/10/2017 at 08:00 Yes Unknown, Entered By History   apixaban ANTICOAGULANT (ELIQUIS) 5 MG tablet Take 1 tablet (5 mg) by mouth 2 times daily 12/10/2017 at 22:00 Yes Az Johnson MD   atenolol (TENORMIN) 50 MG tablet Take 1 tablet by mouth daily 12/10/2017 at 08:00 Yes Az Johnson MD   levothyroxine (SYNTHROID) 200 MCG tablet Take 200 mcg by mouth daily 12/10/2017 at 08:00 Yes Reported, Patient

## 2017-12-11 NOTE — PLAN OF CARE
Problem: Patient Care Overview  Goal: Plan of Care/Patient Progress Review  PRIMARY DIAGNOSIS: ACUTE PAIN  OUTPATIENT/OBSERVATION GOALS TO BE MET BEFORE DISCHARGE:  1. Pain Status: continues to have pain, rating 4/10    2. Return to near baseline physical activity: YES    3. Cleared for discharge by consultants (if involved): YES    Discharge Planner Nurse   Safe discharge environment identified: YES  Barriers to discharge: NO       Entered by: Denise Shafer 12/11/2017 1:23 PM     Please review provider order for any additional goals.   Nurse to notify provider when observation goals have been met and patient is ready for discharge.

## 2017-12-11 NOTE — DISCHARGE SUMMARY
[facility]    Discharge Summary  Hospitalist    Date of Admission:  12/11/2017  Date of Discharge:  12/11/2017  Provider:  Tenzin Pinon MD  Date of Service (when I last saw the patient): 12/11/17    Discharge Diagnoses   1. Biliary colic.  2. Early cholecystitis  3. Lithiasic cholecystopathy.     Other medical issues:  Past Medical History:   Diagnosis Date     Anxiety      Atrial flutter (H)     On Holter 5/24/2016     Cardiomyopathy (H)      Chronic ear infection      HTN (hypertension)      Hypothyroid      LVH (left ventricular hypertrophy)      Morbid obesity (H)      MILTON (obstructive sleep apnea)     CPAP     Palpitations        History of Present Illness   Makenzie Blanco is an 47 year old female who presented with RUQ pain, leukocytosis and GB stones.  Please see the admission history and physical for full details.    Hospital Course   Makenzie Blanco was admitted on 12/11/2017.  The following problems were addressed during her hospitalization:  At this point, given the symptomatic improvement, patient may go home on antibiotic and pain medication to be readmitted on Wednesday for a laparoscopic cholecystectomy.  Surgeon does want to wait until the residual effect of the apixaban is totally gone. She has been already scheduled by the surgical service.  I offered her to state until tomorrow morning but she prefers to go home.      Significant Results and Procedures   See below     Pending Results      Unresulted Labs Ordered in the Past 30 Days of this Admission     No orders found from 10/12/2017 to 12/12/2017.          Code Status   Full Code       Primary Care Physician   Srikanth Morris    GEN:  Obese. Alert, oriented x 3, appears comfortable, NAD.  HEENT:  Normocephalic/atraumatic, no scleral icterus, no nasal discharge, mouth moist.  CV:  Regular rate and rhythm, no murmur or JVD.  S1 + S2 noted, no S3 or S4.  LUNGS:  Clear to auscultation bilaterally without  rales/rhonchi/wheezing/retractions.  Symmetric chest rise on inhalation noted.  ABD:  Active bowel sounds, soft,  Tender in RUQ/non-distended.  No rebound/guarding/rigidity.  EXT:  No edema or cyanosis.  No joint synovitis noted.  SKIN:  Dry to touch, no exanthems noted in the visualized areas.     Discharge Disposition   Discharged to home    Consultations This Hospital Stay   SURGERY GENERAL IP CONSULT    Time Spent on this Encounter   I, Tenzin Pinon, personally saw the patient today and spent less than or equal to 30 minutes discharging this patient.     Discharge Orders     Reason for your hospital stay   Acute cholecystitis     Follow-up and recommended labs and tests    Follow up with Dr. Davidson , at [facility] 12/13/2017 For lap cholecystectomy. Return to hospital in case of high fever or unbearable pain.     Activity   Your activity upon discharge: activity as tolerated     Full Code     Diet   Follow this diet upon discharge: Low fat and Low residue       Discharge Medications   Current Discharge Medication List      START taking these medications    Details   HYDROmorphone (DILAUDID) 4 MG tablet Take 1 tablet (4 mg) by mouth every 4 hours as needed for moderate to severe pain maximum 6 tablet(s) per day  Qty: 12 tablet, Refills: 0    Associated Diagnoses: Symptomatic cholelithiasis      amoxicillin-clavulanate (AUGMENTIN) 875-125 MG per tablet Take 1 tablet by mouth 2 times daily for 4 doses  Qty: 4 tablet, Refills: 0    Associated Diagnoses: Symptomatic cholelithiasis         CONTINUE these medications which have CHANGED    Details   apixaban ANTICOAGULANT (ELIQUIS) 5 MG tablet Take 1 tablet (5 mg) by mouth 2 times daily  Qty: 180 tablet, Refills: 1    Associated Diagnoses: Atrial flutter, unspecified type (H); Palpitations; Benign essential hypertension         CONTINUE these medications which have NOT CHANGED    Details   triamterene-hydrochlorothiazide (MAXZIDE-25) 37.5-25 MG per tablet  Take 1 tablet by mouth daily      atenolol (TENORMIN) 50 MG tablet Take 50 mg by mouth daily      levothyroxine (SYNTHROID) 200 MCG tablet Take 200 mcg by mouth daily           Allergies   No Known Allergies  Data   Most Recent 3 CBC's:  Recent Labs   Lab Test  12/11/17 0328   WBC  12.2*   HGB  13.0   MCV  81   PLT  397      Most Recent 3 BMP's:  Recent Labs   Lab Test  12/11/17   0328 12/16/15   NA  134  140   POTASSIUM  3.8  5.1   CHLORIDE  102  103   CO2  24   --    BUN  14  13   CR  0.70  0.8   ANIONGAP  8   --    GERMAN  9.4  9.1   GLC  126*  96     Most Recent 2 LFT's:  Recent Labs   Lab Test  12/11/17   0328 12/16/15   AST  15  18   ALT  21  26   ALKPHOS  88  89   BILITOTAL  0.3  0.41     Most Recent INR's and Anticoagulation Dosing History:  Anticoagulation Dose History     Recent Dosing and Labs Latest Ref Rng & Units 12/11/2017    INR 0.86 - 1.14 1.29(H)        Most Recent 3 Troponin's:No lab results found.  Most Recent Cholesterol Panel:  Recent Labs   Lab Test 03/30/15   CHOL  160   LDL  88   HDL  52   TRIG  100     Most Recent 6 Bacteria Isolates From Any Culture (See EPIC Reports for Culture Details):No lab results found.  Most Recent TSH, T4 and A1c Labs:No lab results found.  Results for orders placed or performed during the hospital encounter of 12/11/17   Abdomen US, limited (RUQ only)    Narrative    US ABDOMEN LIMITED 12/11/2017 4:59 AM    CLINICAL INFORMATION: Right upper quadrant pain.     COMPARISON: None.    FINDINGS:  Limited right upper quadrant ultrasound demonstrates multiple  gallstones. No gallbladder wall thickening or sonographic Raza's  sign. No bile duct dilatation. Increased liver echogenicity consistent  with fatty infiltration. The pancreas is obscured and not  diagnostically visualized. The visualized portion of the right kidney  is unremarkable. No hydronephrosis on the right. Examination limited  by body habitus. No free fluid in the right upper quadrant.      Impression     IMPRESSION:  1. Cholelithiasis without sonographic evidence of cholecystitis.  2. Fatty infiltration of the liver.    TAMEKA FOUNTAIN MD     Disclaimer: This note consists of symbols derived from keyboarding, dictation and/or voice recognition software. As a result, there may be errors in the script that have gone undetected. Please consider this when interpreting information found in this chart.

## 2017-12-11 NOTE — DISCHARGE INSTRUCTIONS
Epigastric Pain (Uncertain Cause)     Epigastric pain can be a sign of disease in the upper abdomen. Common causes include:    Acid reflux (stomach acid flowing up into the esophagus)    Gastritis (irritation of the stomach lining)    Peptic Ulcer Disease    Inflammation of the pancreas    Gallstone    Infection in the gallbladder  Pain may be dull or burning. It may spread upward to the chest or to the back. There may be other symptoms such as belching, bloating, cramps or hunger pains. There may be weight loss or poor appetite, nausea or vomiting.  Since the diagnosis of your pain is not certain yet, further tests may sometimes be needed. Sometimes the doctor will treat you for the most likely condition to see if there is improvement before doing further tests.  Home care  Medicines    Antacids help neutralize the normal acids in your stomach. Examples are Maalox, Mylanta, Rolaids, and Tums. If you don t like the liquid, you can also try a chewable one. You may find one works better than another for you. Overuse can cause diarrhea or constipation.    Acid blockers (H2 blockers) decrease acid production. Examples are cimetidine (Tagamet), famotidine (Pepcid) and ranitidine (Zantac).    Acid inhibitors (PPIs) decrease acid production in a different way than the blockers. You may find they work better, but can take a little longer to take effect.  Examples are omeprazole (Prilosec), lansoprazole (Prevacid), pantoprazole (Protonix), rabeprazole (Aciphex), and esomeprazole (Nexium).    Take an antacid 30-60 minutes after eating and at bedtime, but not at the same time as an acid blocker.    Try not to take NSAIDs. Aspirin may also cause problems, but if taking it for your heart or other medical reasons, talk to your doctor before stopping it; you do not want to cause a worse problem, like a heart attack or stroke.  Diet    If certain foods seem to cause your spasm, try to avoid them.     Eat slowly and chew food well  before swallowing. Symptoms of gastritis can be worsened by certain foods. Limit or avoid fatty, fried, and spicy foods, as well as coffee, chocolate, mint, and foods with high acid content such as tomatoes and citrus fruit and juices (orange, grapefruit, lemon).    Avoid alcohol, caffeine, and tobacco, which can delay healing and worsen your problem.    Try eating smaller meals with snacks in between  Follow-up care  Follow up with your healthcare provider or as advised.  When to seek medical advice  Call your healthcare provider right away if any of the following occur:    Stomach pain worsens or moves to the right lower part of the abdomen    Chest pain appears, or if it worsens or spreads to the chest, back, neck, shoulder, or arm    Frequent vomiting (can t keep down liquids)    Blood in the stool or vomit (red or black color)    Feeling weak or dizzy, fainting, or having trouble breathing    Fever of 100.4 F (38 C) or higher, or as directed by your healthcare provider    Abdominal swelling  Date Last Reviewed: 9/25/2015 2000-2017 [employer] [address] [place] [address], [place] [address]. All rights reserved. This information is not intended as a substitute for professional medical care. Always follow your healthcare professional's instructions.

## 2017-12-11 NOTE — PLAN OF CARE
Problem: Patient Care Overview  Goal: Plan of Care/Patient Progress Review  Outcome: Improving  PRIMARY DIAGNOSIS: ACUTE PAIN  OUTPATIENT/OBSERVATION GOALS TO BE MET BEFORE DISCHARGE:  1. Pain Status: Improving, last given PO dilaudid with some relief.    2. Return to near baseline physical activity: YES    3. Cleared for discharge by consultants (if involved): YES    Discharge Planner Nurse   Safe discharge environment identified: YES  Barriers to discharge: No        Entered by: Denise Shafer 12/11/2017 3:01 PM     Please review provider order for any additional goals.   Nurse to notify provider when observation goals have been met and patient is ready for discharge.    Pt admitted with abdominal pain, starting to have some improvement with PO dilaudid.  Given Zofran for nausea, on clear liquids, tolerating.  Ambulates independently.  Xarelto on hold for surgery Wednesday.  Possible discharge tonight for outpatient surgery Wednesday.

## 2017-12-11 NOTE — H&P
[facility]  Hospitalist Admission Note  Name: Makenzie Blanco    MRN: [id number]  YOB: 1970    Age: 47 year old  Date of admission: 12/11/2017  Primary care provider: Srikanth Morris    Chief Complaint:  Abdominal pain, suspect biliary colic    Makenzie Blanco is a 47 year old female with PMH including a-flutter on eliquis, anxiety, LVH hypothyroidism, obesity, HTN, MILTON on cpap who presents with RUQ abdominal pain with radiation to her back.  Workup in the ER revealed mild leukocytosis with RUQ US showing cholelithiasis without obvious cholecystitis.  She is being admitted for further care.    Assessment and Plan:   1. Crampy RUQ Abdominal Pain: came on after eating a fatty meal w/ sonographic evidence of gall stones.  most likely due to biliary colic vs early cholecystitis.  LFTs and lipase are normal.  If her symptoms completely resolve she could consider a conservative approach but if they are refractory she may require cholecystectomy.  For now we'll plan to admit her for supportive care with fluids, pain control and general surgery consultation to determine whether they feel surgery is warranted.   --admit to obs status  --hold eliquis  - per Surgery will need to be off this for 24 hours prior to surgery (if needed)  --General Surgery aware of the patient, will formally consult them now  --continue IVF, prn pain control  --NPO for now primarily to avoid worsening her biliary colic.  Would need to be NPO prior to surgery but that likely will not be until late today/tommorrow given that she took Eliquis last evening.  --will keep her on empiric IV abx given her leukocytosis with unasyn for possible early cholecystitis    2.   Hx of a-flutter on eliquis: hold eliquis pending surgical plan as below.  Currently NSR.  Follows w/ cardiology.  At some risk for going into a-fib/flutter loretta-operatively.    3.   Loretta-operative evaluation: RCRI score is 1 for intra-peritoneal surgery  indicating 0.9% risk for cardiac morbidity or mortality.  However, I do feel she is at higher than normal risk for her age (47) for mckenna-operative complications in the setting of obesity, MILTON, HTN, LVH.  She likely will have a technically more difficult surgery and is at some risk for more difficulty extubating but it doesn't seem she has had any recent cardiopulmonary symptoms (other than chronic dyspnea on exertion which she relates to her weight and feels is stable) and I do not note any modifiable risk factors that could be further optimized prior to a potential surgery.  Her last TTE was 4/20/16 and showed normal EF but with LVH present.    --check INR  --check baseline EKG, my colleague will need to follow up on this later this morning prior to potential surgery  --otherwise no further medical optimization seems possible, particularly if her symptoms are refractory.    4.   Anxiety: describes this as mainly situational, does not take any medications for this or see a provider.    5.   Hypertension: resume home atenolol, monitor.  I will hold her home hctz while NPO    6.  Leukocytosis: recheck.      7.  Morbid obesity    8.  MILTON: on cpap    9.  Mirena IUD birth control    DVT Prophylaxis: ambulate.  Hold eliquis prior to potential cholecystectomy  Code Status: Full Code  Discharge Dispo: admit to obs status      History of Present Illness:  Makenzie Blanco is a 47 year old female with PMH including a-flutter on eliquis, anxiety, LVH hypothyroidism, obesity, HTN, MILTON on cpap who presents with RUQ abdominal pain with radiation to her back.  She notes that last night she had pizza then ice cream for dinner then developed what seemed like indigestion and some abdominal discomfort . She took tagamet w/out relief.  She laid down and went to bed but then the pain eventually worsened and became sharper/stabbing in character.  She came to the ER and here workup including RUQ US showed cholelithiasis without clear  "sonographic evidence of cholecystitis with presence of fatty liver.  Her wbc was elevated to 12.2.    She still has pain now    She denies ever having this sort of pain before.    S         Past Medical History:  Past Medical History:   Diagnosis Date     Anxiety      Atrial flutter (H)     On Holter 5/24/2016     Cardiomyopathy (H)      Chronic ear infection      HTN (hypertension)      Hypothyroid      LVH (left ventricular hypertrophy)      Morbid obesity (H)      MILTON (obstructive sleep apnea)     CPAP     Palpitations      Past Surgical History:  Tonsillectomy  D&C, remote    Social History:    Social History   Substance Use Topics     Smoking status: Never Smoker     Smokeless tobacco: Never Used     Alcohol use 0.0 oz/week     0 Standard drinks or equivalent per week      Comment: occasional      Social History     Social History Narrative     Family History:  Family History   Problem Relation Age of Onset     Hypertension Mother      Allergies:  No Known Allergies  Medications:  Prior to Admission Medications   Prescriptions Last Dose Informant Patient Reported? Taking?   apixaban ANTICOAGULANT (ELIQUIS) 5 MG tablet 12/10/2017 at Unknown time  No Yes   Sig: Take 1 tablet (5 mg) by mouth 2 times daily   atenolol (TENORMIN) 50 MG tablet 12/10/2017 at Unknown time  No Yes   Sig: Take 1.5 tablets (75 mg) by mouth daily   levothyroxine (SYNTHROID) 200 MCG tablet 12/10/2017 at Unknown time  Yes Yes   Sig: Take 200 mcg by mouth daily      Facility-Administered Medications: None   also hctz      Review of Systems:  A Comprehensive greater than 10 system review of systems was carried out.  Pertinent positives and negatives are noted above.  Otherwise negative for contributory information.     Physical Exam:  Blood pressure 146/66, pulse 80, temperature 98  F (36.7  C), temperature source Temporal, resp. rate 17, height 1.651 m (5' 5\"), weight (!) 167.8 kg (370 lb), SpO2 93 %.  Wt Readings from Last 1 Encounters: "   12/11/17 (!) 167.8 kg (370 lb)     Exam:  General: Alert, awake, no acute distress.  Obese, non-toxic.  HEENT: NC/AT, eyes anicteric, external occular movements intact, face symmetric.  Dentition WNL, MM moist.  Cardiac: RRR, S1, S2.  No murmurs appreciated.  Pulmonary: Normal chest rise, normal work of breathing.  Lungs CTA BL  Abdomen: RUQ ttp.  Otherwise soft, obese, non-tender, non-distended.  Bowel Sounds Present.  No guarding.  Extremities: trace edema, no deformities.  Warm, well perfused.  Skin: no rashes or lesions noted.  Warm and Dry.  Neuro: No focal deficits noted.  Speech clear.  Coordination and strength grossly normal.  Psych: Appropriate affect.    Data:  EKG:  None yet.  Imaging:  Recent Results (from the past 48 hour(s))   Abdomen US, limited (RUQ only)    Narrative    US ABDOMEN LIMITED 12/11/2017 4:59 AM    CLINICAL INFORMATION: Right upper quadrant pain.     COMPARISON: None.    FINDINGS:  Limited right upper quadrant ultrasound demonstrates multiple  gallstones. No gallbladder wall thickening or sonographic Raza's  sign. No bile duct dilatation. Increased liver echogenicity consistent  with fatty infiltration. The pancreas is obscured and not  diagnostically visualized. The visualized portion of the right kidney  is unremarkable. No hydronephrosis on the right. Examination limited  by body habitus. No free fluid in the right upper quadrant.      Impression    IMPRESSION:  1. Cholelithiasis without sonographic evidence of cholecystitis.  2. Fatty infiltration of the liver.    TAMEKA FOUNTAIN MD     Labs:    Recent Labs  Lab 12/11/17  0328   WBC 12.2*   HGB 13.0   HCT 39.7   MCV 81             Lab Results   Component Value Date     12/11/2017     12/16/2015    Lab Results   Component Value Date    CHLORIDE 102 12/11/2017    CHLORIDE 103 12/16/2015    Lab Results   Component Value Date    BUN 14 12/11/2017    BUN 13 12/16/2015      Lab Results   Component Value Date     POTASSIUM 3.8 12/11/2017    POTASSIUM 5.1 12/16/2015    Lab Results   Component Value Date    CO2 24 12/11/2017    Lab Results   Component Value Date    CR 0.70 12/11/2017    CR 0.8 12/16/2015        No results for input(s): INR in the last 168 hours.  No results for input(s): LACT in the last 168 hours.    Recent Labs  Lab 12/11/17  0328   LIPASE 142           Fer Valera MD  Hospitalist  [facility]

## 2017-12-11 NOTE — IP AVS SNAPSHOT
[place] [address] [facility]    [address]    [facility] [address]    Phone:  [phone]    Fax:  [phone]                                       After Visit Summary   12/11/2017    Makenzie Blanco    MRN: [id number]           After Visit Summary Signature Page     I have received my discharge instructions, and my questions have been answered. I have discussed any challenges I see with this plan with the nurse or doctor.    ..........................................................................................................................................  Patient/Patient Representative Signature      ..........................................................................................................................................  Patient Representative Print Name and Relationship to Patient    ..................................................               ................................................  Date                                            Time    ..........................................................................................................................................  Reviewed by Signature/Title    ...................................................              ..............................................  Date                                                            Time

## 2017-12-11 NOTE — ED NOTES
"[facility]  ED Nurse Handoff Report    Makenzie Blanco is a 47 year old female   ED Chief complaint: Abdominal Pain (anxiety) and Back Pain  . ED Diagnosis:   Final diagnoses:   Symptomatic cholelithiasis     Allergies: No Known Allergies    Code Status: Full Code  Activity level - Baseline/Home:  Independent. Activity Level - Current:   Independent. Lift room needed: No. Bariatric: No   Needed: No   Isolation: No. Infection: Not Applicable.     Vital Signs:   Vitals:    12/11/17 0308 12/11/17 0457 12/11/17 0458   BP: 171/81 146/66    Pulse: 80     Resp: 17     Temp: 98  F (36.7  C)     TempSrc: Temporal     SpO2: 97%  93%   Weight: (!) 167.8 kg (370 lb)     Height: 1.651 m (5' 5\")         Cardiac Rhythm:  ,      Pain level: 0-10 Pain Scale: 6  Patient confused: No. Patient Falls Risk: Yes.   Elimination Status: Has voided   Patient Report - Initial Complaint: RUQ abdominal pain that radiates to her back.  Started after eating pizza for dinner Twan night. Focused Assessment: Pt alert, oriented x4, has received 2 doses of morphine for the pain with some relief.  One does of Zofran given for nausea.  Otherwise independent, VSS.   Tests Performed: labs, ultrasound. Abnormal Results: Ultrasound shows gall stones .   Treatments provided: pain management  Family Comments:  went home but aware of pt's admission  OBS brochure/video discussed/provided to patient:  Yes  ED Medications:   Medications   morphine (PF) injection 4 mg (4 mg Intravenous Given 12/11/17 8360)   morphine (PF) injection 4 mg (4 mg Intravenous Given 12/11/17 0539)   ondansetron (ZOFRAN) injection 4 mg (4 mg Intravenous Given 12/11/17 5623)     Drips infusing:  No  For the majority of the shift, the patient's behavior Green. Interventions performed were N/A.     Severe Sepsis OR Septic Shock Diagnosis Present: No      ED Nurse Name/Phone Number: Jacqueline MONTEJODarwin Vizcarra,   5:59 AM    RECEIVING UNIT ED HANDOFF " REVIEW    Above ED Nurse Handoff Report was reviewed: Yes  Reviewed by: Katelyn Garrido on December 11, 2017 at 6:17 AM

## 2017-12-11 NOTE — PROGRESS NOTES
Patient seen, interviewed and a physical exam performed.  I also reviewed lab workup.  I had the opportunity to discuss the case with Dr. Dominguez from the surgical service.  At this point given the symptomatic improvement patient may go home on antibiotic and pain medication to be readmitted on Wednesday for a laparoscopic cholecystectomy.  She has been already scheduled by the surgical service.  I offered her to state until tomorrow morning but she prefers to go home.

## 2017-12-11 NOTE — IP AVS SNAPSHOT
MRN:[id number]                      After Visit Summary   12/11/2017    Makenzie Blanco    MRN: [id number]           Thank you!     Thank you for choosing [facility] for your care. Our goal is always to provide you with excellent care. Hearing back from our patients is one way we can continue to improve our services. Please take a few minutes to complete the written survey that you may receive in the mail after you visit. If you would like to speak to someone directly about your visit please contact Patient Relations at [phone]. Thank you!          Patient Information     Date Of Birth          1970        Designated Caregiver       Most Recent Value    Caregiver    Will someone help with your care after discharge? yes    Name of designated caregiver Jani Blanco ()    Phone number of caregiver [phone]      About your hospital stay     You were admitted on:  December 11, 2017 You last received care in the:  [address] [facility]    You were discharged on:  December 11, 2017        Reason for your hospital stay       Acute cholecystitis                  Who to Call     For medical emergencies, please call 911.  For non-urgent questions about your medical care, please call your primary care provider or clinic, [phone]          Attending Provider     Provider Specialty    Bernabe Cedillo DO Emergency Medicine    Nick Valera MD Internal Medicine       Primary Care Provider Office Phone # Fax #    Srikanth Morris [phone]      After Care Instructions     Activity       Your activity upon discharge: activity as tolerated            Diet       Follow this diet upon discharge: Low fat and Low residue                  Follow-up Appointments     Follow-up and recommended labs and tests        Follow up with Dr. Davidson , at [facility] 12/13/2017 For lap cholecystectomy. Return to hospital in case of high  fever or unbearable pain.                  Your next 10 appointments already scheduled     Dec 13, 2017   Procedure with Kvng Davidson MD   [facility] (--)    [address]  [place] [address]   [phone]            Dec 13, 2017 11:00 AM [facility] In House with Kvng Davidson MD, Roopa Duarte PA-C   [facility] ([facility])    [facility] ([facility])   [phone]              Further instructions from your care team         Epigastric Pain (Uncertain Cause)     Epigastric pain can be a sign of disease in the upper abdomen. Common causes include:    Acid reflux (stomach acid flowing up into the esophagus)    Gastritis (irritation of the stomach lining)    Peptic Ulcer Disease    Inflammation of the pancreas    Gallstone    Infection in the gallbladder  Pain may be dull or burning. It may spread upward to the chest or to the back. There may be other symptoms such as belching, bloating, cramps or hunger pains. There may be weight loss or poor appetite, nausea or vomiting.  Since the diagnosis of your pain is not certain yet, further tests may sometimes be needed. Sometimes the doctor will treat you for the most likely condition to see if there is improvement before doing further tests.  Home care  Medicines    Antacids help neutralize the normal acids in your stomach. Examples are Maalox, Mylanta, Rolaids, and Tums. If you don t like the liquid, you can also try a chewable one. You may find one works better than another for you. Overuse can cause diarrhea or constipation.    Acid blockers (H2 blockers) decrease acid production. Examples are cimetidine (Tagamet), famotidine (Pepcid) and ranitidine (Zantac).    Acid inhibitors (PPIs) decrease acid production in a different way than the blockers. You may find they work better, but can take a little longer to  take effect.  Examples are omeprazole (Prilosec), lansoprazole (Prevacid), pantoprazole (Protonix), rabeprazole (Aciphex), and esomeprazole (Nexium).    Take an antacid 30-60 minutes after eating and at bedtime, but not at the same time as an acid blocker.    Try not to take NSAIDs. Aspirin may also cause problems, but if taking it for your heart or other medical reasons, talk to your doctor before stopping it; you do not want to cause a worse problem, like a heart attack or stroke.  Diet    If certain foods seem to cause your spasm, try to avoid them.     Eat slowly and chew food well before swallowing. Symptoms of gastritis can be worsened by certain foods. Limit or avoid fatty, fried, and spicy foods, as well as coffee, chocolate, mint, and foods with high acid content such as tomatoes and citrus fruit and juices (orange, grapefruit, lemon).    Avoid alcohol, caffeine, and tobacco, which can delay healing and worsen your problem.    Try eating smaller meals with snacks in between  Follow-up care  Follow up with your healthcare provider or as advised.  When to seek medical advice  Call your healthcare provider right away if any of the following occur:    Stomach pain worsens or moves to the right lower part of the abdomen    Chest pain appears, or if it worsens or spreads to the chest, back, neck, shoulder, or arm    Frequent vomiting (can t keep down liquids)    Blood in the stool or vomit (red or black color)    Feeling weak or dizzy, fainting, or having trouble breathing    Fever of 100.4 F (38 C) or higher, or as directed by your healthcare provider    Abdominal swelling  Date Last Reviewed: 9/25/2015 2000-2017 [employer]. [address] [place] [address], [place] [address]. All rights reserved. This information is not intended as a substitute for professional medical care. Always follow your healthcare professional's instructions.          Pending Results     No orders found from 12/9/2017 to  "12/12/2017.            Statement of Approval     Ordered          12/11/17 1629  I have reviewed and agree with all the recommendations and orders detailed in this document.  EFFECTIVE NOW     Approved and electronically signed by:  Tenzin Pinon MD             Admission Information     Date & Time Provider Department Dept. Phone    12/11/2017 Nick Valera MD [place] [address] Medical Surgical [phone]      Your Vitals Were     Blood Pressure Pulse Temperature Respirations Height Weight    135/73 (BP Location: Left arm) 76 97.9  F (36.6  C) (Oral) 18 1.651 m (5' 5\") 180 kg (396 lb 12.8 oz)    Pulse Oximetry BMI (Body Mass Index)                94% 66.03 kg/m2          MyChart Information     [facility] gives you secure access to your electronic health record. If you see a primary care provider, you can also send messages to your care team and make appointments. If you have questions, please call your primary care clinic.  If you do not have a primary care provider, please call [phone] and they will assist you.        Care EveryWhere ID     This is your Care EveryWhere ID. This could be used by other organizations to access your [place] medical records  [id number]        Equal Access to Services     GUSTABO [place] : Jumana Tobar, wamona soto, qaybta kaalmada nay, maria isabel goode. So [facility] [phone].    ATENCIÓN: Si habla español, tiene a shah disposición servicios gratuitos de asistencia lingüística. Llame al [phone].    We comply with applicable federal civil rights laws and [place] laws. We do not discriminate on the basis of race, color, national origin, age, disability, sex, sexual orientation, or gender identity.               Review of your medicines      START taking        Dose / Directions    amoxicillin-clavulanate 875-125 MG per tablet   Commonly known as:  AUGMENTIN   Used for:  Symptomatic cholelithiasis        Dose:  " 1 tablet   Take 1 tablet by mouth 2 times daily for 4 doses   Quantity:  4 tablet   Refills:  0       HYDROmorphone 4 MG tablet   Commonly known as:  DILAUDID   Used for:  Symptomatic cholelithiasis        Dose:  4 mg   Take 1 tablet (4 mg) by mouth every 4 hours as needed for moderate to severe pain maximum 6 tablet(s) per day   Quantity:  12 tablet   Refills:  0         CONTINUE these medicines which have NOT CHANGED        Dose / Directions    apixaban ANTICOAGULANT 5 MG tablet   Commonly known as:  ELIQUIS   Used for:  Atrial flutter, unspecified type (H), Palpitations, Benign essential hypertension        Dose:  5 mg   Start taking on:  12/14/2017   Take 1 tablet (5 mg) by mouth 2 times daily   Quantity:  180 tablet   Refills:  1       atenolol 50 MG tablet   Commonly known as:  TENORMIN        Dose:  50 mg   Take 50 mg by mouth daily   Refills:  0       SYNTHROID 200 MCG tablet   Generic drug:  levothyroxine        Dose:  200 mcg   Take 200 mcg by mouth daily   Refills:  0       triamterene-hydrochlorothiazide 37.5-25 MG per tablet   Commonly known as:  MAXZIDE-25        Dose:  1 tablet   Take 1 tablet by mouth daily   Refills:  0            Where to get your medicines      These medications were sent to [facility] [address] [address] [address]     Phone:  [phone]     amoxicillin-clavulanate 875-125 MG per tablet         Some of these will need a paper prescription and others can be bought over the counter. Ask your nurse if you have questions.     Bring a paper prescription for each of these medications     HYDROmorphone 4 MG tablet               ANTIBIOTIC INSTRUCTION     You've Been Prescribed an Antibiotic - Now What?  Your healthcare team thinks that you or your loved one might have an infection. Some infections can be treated with antibiotics, which are powerful, life-saving drugs. Like all medications, antibiotics have side  effects and should only be used when necessary. There are some important things you should know about your antibiotic treatment.      Your healthcare team may run tests before you start taking an antibiotic.    Your team may take samples (e.g., from your blood, urine or other areas) to run tests to look for bacteria. These test can be important to determine if you need an antibiotic at all and, if you do, which antibiotic will work best.      Within a few days, your healthcare team might change or even stop your antibiotic.    Your team may start you on an antibiotic while they are working to find out what is making you sick.    Your team might change your antibiotic because test results show that a different antibiotic would be better to treat your infection.    In some cases, once your team has more information, they learn that you do not need an antibiotic at all. They may find out that you don't have an infection, or that the antibiotic you're taking won't work against your infection. For example, an infection caused by a virus can't be treated with antibiotics. Staying on an antibiotic when you don't need it is more likely to be harmful than helpful.      You may experience side effects from your antibiotic.    Like all medications, antibiotics have side effects. Some of these can be serious.    Let you healthcare team know if you have any known allergies when you are admitted to the hospital.    One significant side effect of nearly all antibiotics is the risk of severe and sometimes deadly diarrhea caused by Clostridium difficile (C. Difficile). This occurs when a person takes antibiotics because some good germs are destroyed. Antibiotic use allows C. diificile to take over, putting patients at high risk for this serious infection.    As a patient or caregiver, it is important to understand your or your loved one's antibiotic treatment. It is especially important for caregivers to speak up when patients can't  speak for themselves. Here are some important questions to ask your healthcare team.    What infection is this antibiotic treating and how do you know I have that infection?    What side effects might occur from this antibiotic?    How long will I need to take this antibiotic?    Is it safe to take this antibiotic with other medications or supplements (e.g., vitamins) that I am taking?     Are there any special directions I need to know about taking this antibiotic? For example, should I take it with food?    How will I be monitored to know whether my infection is responding to the antibiotic?    What tests may help to make sure the right antibiotic is prescribed for me?      Information provided by:  www.cdc.gov/getsmart  U.S. Department of Health and Human Services  Centers for disease Control and Prevention  National Center for Emerging and Zoonotic Infectious Diseases  Division of Healthcare Quality Promotion         Protect others around you: Learn how to safely use, store and throw away your medicines at www.disposemymeds.org.             Medication List: This is a list of all your medications and when to take them. Check marks below indicate your daily home schedule. Keep this list as a reference.      Medications           Morning Afternoon Evening Bedtime As Needed    amoxicillin-clavulanate 875-125 MG per tablet   Commonly known as:  AUGMENTIN   Take 1 tablet by mouth 2 times daily for 4 doses                                apixaban ANTICOAGULANT 5 MG tablet   Commonly known as:  ELIQUIS   Take 1 tablet (5 mg) by mouth 2 times daily   Start taking on:  12/14/2017                                atenolol 50 MG tablet   Commonly known as:  TENORMIN   Take 50 mg by mouth daily   Last time this was given:  50 mg on 12/11/2017  9:50 AM                                HYDROmorphone 4 MG tablet   Commonly known as:  DILAUDID   Take 1 tablet (4 mg) by mouth every 4 hours as needed for moderate to severe pain maximum  6 tablet(s) per day   Last time this was given:  2 mg on 12/11/2017  2:19 PM                                SYNTHROID 200 MCG tablet   Take 200 mcg by mouth daily   Last time this was given:  200 mcg on 12/11/2017  9:02 AM   Generic drug:  levothyroxine                                triamterene-hydrochlorothiazide 37.5-25 MG per tablet   Commonly known as:  MAXZIDE-25   Take 1 tablet by mouth daily

## 2017-12-12 NOTE — H&P (VIEW-ONLY)
[facility]  Hospitalist Admission Note  Name: Makenzie Blanco    MRN: [id number]  YOB: 1970    Age: 47 year old  Date of admission: 12/11/2017  Primary care provider: Srikanth Morris    Chief Complaint:  Abdominal pain, suspect biliary colic    Makenzie Blanco is a 47 year old female with PMH including a-flutter on eliquis, anxiety, LVH hypothyroidism, obesity, HTN, MILTON on cpap who presents with RUQ abdominal pain with radiation to her back.  Workup in the ER revealed mild leukocytosis with RUQ US showing cholelithiasis without obvious cholecystitis.  She is being admitted for further care.    Assessment and Plan:   1. Crampy RUQ Abdominal Pain: came on after eating a fatty meal w/ sonographic evidence of gall stones.  most likely due to biliary colic vs early cholecystitis.  LFTs and lipase are normal.  If her symptoms completely resolve she could consider a conservative approach but if they are refractory she may require cholecystectomy.  For now we'll plan to admit her for supportive care with fluids, pain control and general surgery consultation to determine whether they feel surgery is warranted.   --admit to obs status  --hold eliquis  - per Surgery will need to be off this for 24 hours prior to surgery (if needed)  --General Surgery aware of the patient, will formally consult them now  --continue IVF, prn pain control  --NPO for now primarily to avoid worsening her biliary colic.  Would need to be NPO prior to surgery but that likely will not be until late today/tommorrow given that she took Eliquis last evening.  --will keep her on empiric IV abx given her leukocytosis with unasyn for possible early cholecystitis    2.   Hx of a-flutter on eliquis: hold eliquis pending surgical plan as below.  Currently NSR.  Follows w/ cardiology.  At some risk for going into a-fib/flutter loretta-operatively.    3.   Loretta-operative evaluation: RCRI score is 1 for intra-peritoneal surgery  indicating 0.9% risk for cardiac morbidity or mortality.  However, I do feel she is at higher than normal risk for her age (47) for mckenna-operative complications in the setting of obesity, MILTON, HTN, LVH.  She likely will have a technically more difficult surgery and is at some risk for more difficulty extubating but it doesn't seem she has had any recent cardiopulmonary symptoms (other than chronic dyspnea on exertion which she relates to her weight and feels is stable) and I do not note any modifiable risk factors that could be further optimized prior to a potential surgery.  Her last TTE was 4/20/16 and showed normal EF but with LVH present.    --check INR  --check baseline EKG, my colleague will need to follow up on this later this morning prior to potential surgery  --otherwise no further medical optimization seems possible, particularly if her symptoms are refractory.    4.   Anxiety: describes this as mainly situational, does not take any medications for this or see a provider.    5.   Hypertension: resume home atenolol, monitor.  I will hold her home hctz while NPO    6.  Leukocytosis: recheck.      7.  Morbid obesity    8.  MILTON: on cpap    9.  Mirena IUD birth control    DVT Prophylaxis: ambulate.  Hold eliquis prior to potential cholecystectomy  Code Status: Full Code  Discharge Dispo: admit to obs status      History of Present Illness:  Makenzie Blanco is a 47 year old female with PMH including a-flutter on eliquis, anxiety, LVH hypothyroidism, obesity, HTN, MILTON on cpap who presents with RUQ abdominal pain with radiation to her back.  She notes that last night she had pizza then ice cream for dinner then developed what seemed like indigestion and some abdominal discomfort . She took tagamet w/out relief.  She laid down and went to bed but then the pain eventually worsened and became sharper/stabbing in character.  She came to the ER and here workup including RUQ US showed cholelithiasis without clear  "sonographic evidence of cholecystitis with presence of fatty liver.  Her wbc was elevated to 12.2.    She still has pain now    She denies ever having this sort of pain before.    S         Past Medical History:  Past Medical History:   Diagnosis Date     Anxiety      Atrial flutter (H)     On Holter 5/24/2016     Cardiomyopathy (H)      Chronic ear infection      HTN (hypertension)      Hypothyroid      LVH (left ventricular hypertrophy)      Morbid obesity (H)      MILTON (obstructive sleep apnea)     CPAP     Palpitations      Past Surgical History:  Tonsillectomy  D&C, remote    Social History:    Social History   Substance Use Topics     Smoking status: Never Smoker     Smokeless tobacco: Never Used     Alcohol use 0.0 oz/week     0 Standard drinks or equivalent per week      Comment: occasional      Social History     Social History Narrative     Family History:  Family History   Problem Relation Age of Onset     Hypertension Mother      Allergies:  No Known Allergies  Medications:  Prior to Admission Medications   Prescriptions Last Dose Informant Patient Reported? Taking?   apixaban ANTICOAGULANT (ELIQUIS) 5 MG tablet 12/10/2017 at Unknown time  No Yes   Sig: Take 1 tablet (5 mg) by mouth 2 times daily   atenolol (TENORMIN) 50 MG tablet 12/10/2017 at Unknown time  No Yes   Sig: Take 1.5 tablets (75 mg) by mouth daily   levothyroxine (SYNTHROID) 200 MCG tablet 12/10/2017 at Unknown time  Yes Yes   Sig: Take 200 mcg by mouth daily      Facility-Administered Medications: None   also hctz      Review of Systems:  A Comprehensive greater than 10 system review of systems was carried out.  Pertinent positives and negatives are noted above.  Otherwise negative for contributory information.     Physical Exam:  Blood pressure 146/66, pulse 80, temperature 98  F (36.7  C), temperature source Temporal, resp. rate 17, height 1.651 m (5' 5\"), weight (!) 167.8 kg (370 lb), SpO2 93 %.  Wt Readings from Last 1 Encounters: "   12/11/17 (!) 167.8 kg (370 lb)     Exam:  General: Alert, awake, no acute distress.  Obese, non-toxic.  HEENT: NC/AT, eyes anicteric, external occular movements intact, face symmetric.  Dentition WNL, MM moist.  Cardiac: RRR, S1, S2.  No murmurs appreciated.  Pulmonary: Normal chest rise, normal work of breathing.  Lungs CTA BL  Abdomen: RUQ ttp.  Otherwise soft, obese, non-tender, non-distended.  Bowel Sounds Present.  No guarding.  Extremities: trace edema, no deformities.  Warm, well perfused.  Skin: no rashes or lesions noted.  Warm and Dry.  Neuro: No focal deficits noted.  Speech clear.  Coordination and strength grossly normal.  Psych: Appropriate affect.    Data:  EKG:  None yet.  Imaging:  Recent Results (from the past 48 hour(s))   Abdomen US, limited (RUQ only)    Narrative    US ABDOMEN LIMITED 12/11/2017 4:59 AM    CLINICAL INFORMATION: Right upper quadrant pain.     COMPARISON: None.    FINDINGS:  Limited right upper quadrant ultrasound demonstrates multiple  gallstones. No gallbladder wall thickening or sonographic Raza's  sign. No bile duct dilatation. Increased liver echogenicity consistent  with fatty infiltration. The pancreas is obscured and not  diagnostically visualized. The visualized portion of the right kidney  is unremarkable. No hydronephrosis on the right. Examination limited  by body habitus. No free fluid in the right upper quadrant.      Impression    IMPRESSION:  1. Cholelithiasis without sonographic evidence of cholecystitis.  2. Fatty infiltration of the liver.    TAMEKA FOUNTAIN MD     Labs:    Recent Labs  Lab 12/11/17  0328   WBC 12.2*   HGB 13.0   HCT 39.7   MCV 81             Lab Results   Component Value Date     12/11/2017     12/16/2015    Lab Results   Component Value Date    CHLORIDE 102 12/11/2017    CHLORIDE 103 12/16/2015    Lab Results   Component Value Date    BUN 14 12/11/2017    BUN 13 12/16/2015      Lab Results   Component Value Date     POTASSIUM 3.8 12/11/2017    POTASSIUM 5.1 12/16/2015    Lab Results   Component Value Date    CO2 24 12/11/2017    Lab Results   Component Value Date    CR 0.70 12/11/2017    CR 0.8 12/16/2015        No results for input(s): INR in the last 168 hours.  No results for input(s): LACT in the last 168 hours.    Recent Labs  Lab 12/11/17  0328   LIPASE 142           Fer Valera MD  Hospitalist  [facility]

## 2017-12-12 NOTE — PLAN OF CARE
"Problem: Patient Care Overview  Goal: Plan of Care/Patient Progress Review  PRIMARY DIAGNOSIS: \"GENERIC\" NURSING  OUTPATIENT/OBSERVATION GOALS TO BE MET BEFORE DISCHARGE:  1. ADLs back to baseline: Yes    2. Activity and level of assistance: Ind    3. Pain status: Prn dilaudid 2 mg x1.     4. Return to near baseline physical activity: Yes     Discharge Planner Nurse   Safe discharge environment identified: Yes  Barriers to discharge:Yes       Entered by: Jayden Holbrook 12/11/2017 6:53 PM     Please review provider order for any additional goals.   Nurse to notify provider when observation goals have been met and patient is ready for discharge.  Pt is discharging to home w/ transportation from . D/c instructions provided, all questions answered. D/C meds provided. PRN dilaudid 2 mg po x1 this shift.       "

## 2017-12-13 ENCOUNTER — APPOINTMENT (OUTPATIENT)
Dept: SURGERY | Facility: PHYSICIAN GROUP | Age: 47
End: 2017-12-13
Payer: COMMERCIAL

## 2017-12-13 ENCOUNTER — ANESTHESIA (OUTPATIENT)
Dept: SURGERY | Facility: CLINIC | Age: 47
End: 2017-12-13
Payer: COMMERCIAL

## 2017-12-13 ENCOUNTER — HOSPITAL ENCOUNTER (OUTPATIENT)
Facility: CLINIC | Age: 47
Discharge: HOME | End: 2017-12-13
Attending: SURGERY | Admitting: SURGERY
Payer: COMMERCIAL

## 2017-12-13 ENCOUNTER — ANESTHESIA EVENT (OUTPATIENT)
Dept: SURGERY | Facility: CLINIC | Age: 47
End: 2017-12-13
Payer: COMMERCIAL

## 2017-12-13 VITALS
BODY MASS INDEX: 48.82 KG/M2 | WEIGHT: 293 LBS | OXYGEN SATURATION: 97 % | HEIGHT: 65 IN | SYSTOLIC BLOOD PRESSURE: 128 MMHG | TEMPERATURE: 97.1 F | RESPIRATION RATE: 12 BRPM | DIASTOLIC BLOOD PRESSURE: 78 MMHG

## 2017-12-13 DIAGNOSIS — K82.1 GALLBLADDER HYDROPS: Primary | ICD-10-CM

## 2017-12-13 LAB — HCG UR QL: NEGATIVE

## 2017-12-13 PROCEDURE — 47562 LAPAROSCOPIC CHOLECYSTECTOMY: CPT | Mod: AS | Performed by: PHYSICIAN ASSISTANT

## 2017-12-13 PROCEDURE — 88304 TISSUE EXAM BY PATHOLOGIST: CPT | Mod: 26 | Performed by: SURGERY

## 2017-12-13 PROCEDURE — 37000008 ZZH ANESTHESIA TECHNICAL FEE, 1ST 30 MIN: Performed by: SURGERY

## 2017-12-13 PROCEDURE — 25000128 H RX IP 250 OP 636: Performed by: SURGERY

## 2017-12-13 PROCEDURE — 25000128 H RX IP 250 OP 636: Performed by: ANESTHESIOLOGY

## 2017-12-13 PROCEDURE — 27210794 ZZH OR GENERAL SUPPLY STERILE: Performed by: SURGERY

## 2017-12-13 PROCEDURE — 25000128 H RX IP 250 OP 636: Performed by: NURSE ANESTHETIST, CERTIFIED REGISTERED

## 2017-12-13 PROCEDURE — 25000566 ZZH SEVOFLURANE, EA 15 MIN: Performed by: SURGERY

## 2017-12-13 PROCEDURE — 71000027 ZZH RECOVERY PHASE 2 EACH 15 MINS: Performed by: SURGERY

## 2017-12-13 PROCEDURE — 25000125 ZZHC RX 250: Performed by: SURGERY

## 2017-12-13 PROCEDURE — 37000009 ZZH ANESTHESIA TECHNICAL FEE, EACH ADDTL 15 MIN: Performed by: SURGERY

## 2017-12-13 PROCEDURE — 81025 URINE PREGNANCY TEST: CPT | Performed by: ANESTHESIOLOGY

## 2017-12-13 PROCEDURE — 88304 TISSUE EXAM BY PATHOLOGIST: CPT | Performed by: SURGERY

## 2017-12-13 PROCEDURE — 71000012 ZZH RECOVERY PHASE 1 LEVEL 1 FIRST HR: Performed by: SURGERY

## 2017-12-13 PROCEDURE — 25000125 ZZHC RX 250: Performed by: NURSE ANESTHETIST, CERTIFIED REGISTERED

## 2017-12-13 PROCEDURE — 36000058 ZZH SURGERY LEVEL 3 EA 15 ADDTL MIN: Performed by: SURGERY

## 2017-12-13 PROCEDURE — 47562 LAPAROSCOPIC CHOLECYSTECTOMY: CPT | Performed by: SURGERY

## 2017-12-13 PROCEDURE — 36000060 ZZH SURGERY LEVEL 3 W FLUORO 1ST 30 MIN: Performed by: SURGERY

## 2017-12-13 PROCEDURE — 25000132 ZZH RX MED GY IP 250 OP 250 PS 637: Performed by: SURGERY

## 2017-12-13 PROCEDURE — 71000013 ZZH RECOVERY PHASE 1 LEVEL 1 EA ADDTL HR: Performed by: SURGERY

## 2017-12-13 PROCEDURE — 40000306 ZZH STATISTIC PRE PROC ASSESS II: Performed by: SURGERY

## 2017-12-13 RX ORDER — SODIUM CHLORIDE, SODIUM LACTATE, POTASSIUM CHLORIDE, CALCIUM CHLORIDE 600; 310; 30; 20 MG/100ML; MG/100ML; MG/100ML; MG/100ML
INJECTION, SOLUTION INTRAVENOUS CONTINUOUS
Status: DISCONTINUED | OUTPATIENT
Start: 2017-12-13 | End: 2017-12-13 | Stop reason: HOSPADM

## 2017-12-13 RX ORDER — HYDROCODONE BITARTRATE AND ACETAMINOPHEN 5; 325 MG/1; MG/1
1-2 TABLET ORAL EVERY 4 HOURS PRN
Qty: 30 TABLET | Refills: 0 | Status: SHIPPED | OUTPATIENT
Start: 2017-12-13 | End: 2018-09-05

## 2017-12-13 RX ORDER — DEXAMETHASONE SODIUM PHOSPHATE 4 MG/ML
INJECTION, SOLUTION INTRA-ARTICULAR; INTRALESIONAL; INTRAMUSCULAR; INTRAVENOUS; SOFT TISSUE PRN
Status: DISCONTINUED | OUTPATIENT
Start: 2017-12-13 | End: 2017-12-13

## 2017-12-13 RX ORDER — METOPROLOL TARTRATE 1 MG/ML
1-2 INJECTION, SOLUTION INTRAVENOUS EVERY 5 MIN PRN
Status: DISCONTINUED | OUTPATIENT
Start: 2017-12-13 | End: 2017-12-13 | Stop reason: HOSPADM

## 2017-12-13 RX ORDER — ALBUTEROL SULFATE 0.83 MG/ML
2.5 SOLUTION RESPIRATORY (INHALATION) EVERY 4 HOURS PRN
Status: DISCONTINUED | OUTPATIENT
Start: 2017-12-13 | End: 2017-12-13 | Stop reason: HOSPADM

## 2017-12-13 RX ORDER — CEFAZOLIN SODIUM 1 G/3ML
1 INJECTION, POWDER, FOR SOLUTION INTRAMUSCULAR; INTRAVENOUS SEE ADMIN INSTRUCTIONS
Status: DISCONTINUED | OUTPATIENT
Start: 2017-12-13 | End: 2017-12-13 | Stop reason: HOSPADM

## 2017-12-13 RX ORDER — BUPIVACAINE HYDROCHLORIDE 5 MG/ML
INJECTION, SOLUTION EPIDURAL; INTRACAUDAL PRN
Status: DISCONTINUED | OUTPATIENT
Start: 2017-12-13 | End: 2017-12-13 | Stop reason: HOSPADM

## 2017-12-13 RX ORDER — FENTANYL CITRATE 50 UG/ML
25-50 INJECTION, SOLUTION INTRAMUSCULAR; INTRAVENOUS
Status: DISCONTINUED | OUTPATIENT
Start: 2017-12-13 | End: 2017-12-13 | Stop reason: HOSPADM

## 2017-12-13 RX ORDER — CEFAZOLIN SODIUM 1 G/50ML
3 SOLUTION INTRAVENOUS
Status: COMPLETED | OUTPATIENT
Start: 2017-12-13 | End: 2017-12-13

## 2017-12-13 RX ORDER — GLYCOPYRROLATE 0.2 MG/ML
INJECTION, SOLUTION INTRAMUSCULAR; INTRAVENOUS PRN
Status: DISCONTINUED | OUTPATIENT
Start: 2017-12-13 | End: 2017-12-13

## 2017-12-13 RX ORDER — MEPERIDINE HYDROCHLORIDE 25 MG/ML
12.5 INJECTION INTRAMUSCULAR; INTRAVENOUS; SUBCUTANEOUS
Status: DISCONTINUED | OUTPATIENT
Start: 2017-12-13 | End: 2017-12-13 | Stop reason: HOSPADM

## 2017-12-13 RX ORDER — PROMETHAZINE HYDROCHLORIDE 25 MG/ML
6.25 INJECTION, SOLUTION INTRAMUSCULAR; INTRAVENOUS
Status: DISCONTINUED | OUTPATIENT
Start: 2017-12-13 | End: 2017-12-13 | Stop reason: HOSPADM

## 2017-12-13 RX ORDER — PROPOFOL 10 MG/ML
INJECTION, EMULSION INTRAVENOUS CONTINUOUS PRN
Status: DISCONTINUED | OUTPATIENT
Start: 2017-12-13 | End: 2017-12-13

## 2017-12-13 RX ORDER — ONDANSETRON 2 MG/ML
4 INJECTION INTRAMUSCULAR; INTRAVENOUS EVERY 30 MIN PRN
Status: DISCONTINUED | OUTPATIENT
Start: 2017-12-13 | End: 2017-12-13 | Stop reason: HOSPADM

## 2017-12-13 RX ORDER — KETOROLAC TROMETHAMINE 30 MG/ML
30 INJECTION, SOLUTION INTRAMUSCULAR; INTRAVENOUS ONCE
Status: COMPLETED | OUTPATIENT
Start: 2017-12-13 | End: 2017-12-13

## 2017-12-13 RX ORDER — LIDOCAINE 40 MG/G
CREAM TOPICAL
Status: DISCONTINUED | OUTPATIENT
Start: 2017-12-13 | End: 2017-12-13 | Stop reason: HOSPADM

## 2017-12-13 RX ORDER — NALOXONE HYDROCHLORIDE 0.4 MG/ML
.1-.4 INJECTION, SOLUTION INTRAMUSCULAR; INTRAVENOUS; SUBCUTANEOUS
Status: DISCONTINUED | OUTPATIENT
Start: 2017-12-13 | End: 2017-12-13 | Stop reason: HOSPADM

## 2017-12-13 RX ORDER — FENTANYL CITRATE 50 UG/ML
INJECTION, SOLUTION INTRAMUSCULAR; INTRAVENOUS PRN
Status: DISCONTINUED | OUTPATIENT
Start: 2017-12-13 | End: 2017-12-13

## 2017-12-13 RX ORDER — ONDANSETRON 2 MG/ML
INJECTION INTRAMUSCULAR; INTRAVENOUS PRN
Status: DISCONTINUED | OUTPATIENT
Start: 2017-12-13 | End: 2017-12-13

## 2017-12-13 RX ORDER — ONDANSETRON 4 MG/1
4 TABLET, ORALLY DISINTEGRATING ORAL EVERY 30 MIN PRN
Status: DISCONTINUED | OUTPATIENT
Start: 2017-12-13 | End: 2017-12-13 | Stop reason: HOSPADM

## 2017-12-13 RX ORDER — HYDROCODONE BITARTRATE AND ACETAMINOPHEN 5; 325 MG/1; MG/1
1 TABLET ORAL
Status: COMPLETED | OUTPATIENT
Start: 2017-12-13 | End: 2017-12-13

## 2017-12-13 RX ORDER — NEOSTIGMINE METHYLSULFATE 1 MG/ML
VIAL (ML) INJECTION PRN
Status: DISCONTINUED | OUTPATIENT
Start: 2017-12-13 | End: 2017-12-13

## 2017-12-13 RX ORDER — PROPOFOL 10 MG/ML
INJECTION, EMULSION INTRAVENOUS PRN
Status: DISCONTINUED | OUTPATIENT
Start: 2017-12-13 | End: 2017-12-13

## 2017-12-13 RX ORDER — LIDOCAINE HYDROCHLORIDE 10 MG/ML
INJECTION, SOLUTION INFILTRATION; PERINEURAL PRN
Status: DISCONTINUED | OUTPATIENT
Start: 2017-12-13 | End: 2017-12-13

## 2017-12-13 RX ADMIN — SODIUM CHLORIDE, POTASSIUM CHLORIDE, SODIUM LACTATE AND CALCIUM CHLORIDE: 600; 310; 30; 20 INJECTION, SOLUTION INTRAVENOUS at 10:20

## 2017-12-13 RX ADMIN — Medication 3 MG: at 11:36

## 2017-12-13 RX ADMIN — ONDANSETRON 4 MG: 2 INJECTION INTRAMUSCULAR; INTRAVENOUS at 11:27

## 2017-12-13 RX ADMIN — MIDAZOLAM 2 MG: 1 INJECTION INTRAMUSCULAR; INTRAVENOUS at 10:20

## 2017-12-13 RX ADMIN — GLYCOPYRROLATE 0.6 MG: 0.2 INJECTION, SOLUTION INTRAMUSCULAR; INTRAVENOUS at 11:36

## 2017-12-13 RX ADMIN — ROCURONIUM BROMIDE 3 MG: 10 INJECTION INTRAVENOUS at 10:31

## 2017-12-13 RX ADMIN — Medication 3 G: at 10:20

## 2017-12-13 RX ADMIN — ROCURONIUM BROMIDE 17 MG: 10 INJECTION INTRAVENOUS at 10:52

## 2017-12-13 RX ADMIN — PROCHLORPERAZINE EDISYLATE 10 MG: 5 INJECTION INTRAMUSCULAR; INTRAVENOUS at 12:44

## 2017-12-13 RX ADMIN — KETOROLAC TROMETHAMINE 30 MG: 30 INJECTION, SOLUTION INTRAMUSCULAR at 13:00

## 2017-12-13 RX ADMIN — FENTANYL CITRATE 50 MCG: 50 INJECTION INTRAMUSCULAR; INTRAVENOUS at 12:56

## 2017-12-13 RX ADMIN — FENTANYL CITRATE 250 MCG: 50 INJECTION, SOLUTION INTRAMUSCULAR; INTRAVENOUS at 10:31

## 2017-12-13 RX ADMIN — HYDROCODONE BITARTRATE AND ACETAMINOPHEN 1 TABLET: 5; 325 TABLET ORAL at 13:35

## 2017-12-13 RX ADMIN — ROCURONIUM BROMIDE 30 MG: 10 INJECTION INTRAVENOUS at 10:37

## 2017-12-13 RX ADMIN — PROPOFOL 200 MG: 10 INJECTION, EMULSION INTRAVENOUS at 10:31

## 2017-12-13 RX ADMIN — DEXAMETHASONE SODIUM PHOSPHATE 4 MG: 4 INJECTION, SOLUTION INTRA-ARTICULAR; INTRALESIONAL; INTRAMUSCULAR; INTRAVENOUS; SOFT TISSUE at 10:31

## 2017-12-13 RX ADMIN — GLYCOPYRROLATE 0.2 MG: 0.2 INJECTION, SOLUTION INTRAMUSCULAR; INTRAVENOUS at 10:31

## 2017-12-13 RX ADMIN — LIDOCAINE HYDROCHLORIDE 30 MG: 10 INJECTION, SOLUTION INFILTRATION; PERINEURAL at 10:31

## 2017-12-13 RX ADMIN — ONDANSETRON 4 MG: 2 INJECTION INTRAMUSCULAR; INTRAVENOUS at 12:20

## 2017-12-13 RX ADMIN — SODIUM CHLORIDE, POTASSIUM CHLORIDE, SODIUM LACTATE AND CALCIUM CHLORIDE: 600; 310; 30; 20 INJECTION, SOLUTION INTRAVENOUS at 11:13

## 2017-12-13 RX ADMIN — FENTANYL CITRATE 50 MCG: 50 INJECTION INTRAMUSCULAR; INTRAVENOUS at 12:17

## 2017-12-13 RX ADMIN — PROPOFOL 50 MCG/KG/MIN: 10 INJECTION, EMULSION INTRAVENOUS at 10:39

## 2017-12-13 RX ADMIN — Medication 160 MG: at 10:31

## 2017-12-13 ASSESSMENT — ENCOUNTER SYMPTOMS: DYSRHYTHMIAS: 1

## 2017-12-13 NOTE — ANESTHESIA POSTPROCEDURE EVALUATION
Patient: Makenzie Blanco    Procedure(s):  LAPAROSCOPIC CHOLECYSTECTOMY  - Wound Class: III-Contaminated    Diagnosis:Cholelithiasis  Diagnosis Additional Information: Acute on chronic cholecystitis with hydrops     Anesthesia Type:  General, ETT, RSI    Note:  Anesthesia Post Evaluation    Patient location during evaluation: PACU  Patient participation: Able to fully participate in evaluation  Level of consciousness: awake  Pain management: adequate  Airway patency: patent  Cardiovascular status: acceptable  Respiratory status: acceptable  Hydration status: acceptable  PONV: controlled     Anesthetic complications: None          Last vitals:  Vitals:    12/13/17 1350 12/13/17 1400 12/13/17 1417   BP:   133/68   Resp: 12  11   Temp: 97.6  F (36.4  C)  97.1  F (36.2  C)   SpO2:  92% 92%         Electronically Signed By: Ketan Pacheco DO  December 13, 2017  2:43 PM

## 2017-12-13 NOTE — ANESTHESIA CARE TRANSFER NOTE
Patient: Makenzie Blanco    Procedure(s):  LAPAROSCOPIC CHOLECYSTECTOMY  - Wound Class: II-Clean Contaminated    Diagnosis: Cholelithiasis  Diagnosis Additional Information: No value filed.    Anesthesia Type:   General, ETT, RSI     Note:  Airway :Face Mask  Patient transferred to:PACU  Handoff Report: Identifed the Patient, Identified the Reponsible Provider, Reviewed the pertinent medical history, Discussed the surgical course, Reviewed Intra-OP anesthesia mangement and issues during anesthesia, Set expectations for post-procedure period and Allowed opportunity for questions and acknowledgement of understanding      Vitals: (Last set prior to Anesthesia Care Transfer)    CRNA VITALS  12/13/2017 1118 - 12/13/2017 1155      12/13/2017             Resp Rate (observed): 16                Electronically Signed By: CELSO Gordon CRNA  December 13, 2017  11:55 AM

## 2017-12-13 NOTE — ANESTHESIA PREPROCEDURE EVALUATION
Anesthesia Evaluation     .             ROS/MED HX    ENT/Pulmonary:     (+)sleep apnea, uses CPAP , . .    Neurologic:  - neg neurologic ROS     Cardiovascular:     (+) hypertension----. Taking blood thinners : Instructions Given to patient: Eliquis on hold. . . :. dysrhythmias a-flutter, .       METS/Exercise Tolerance:     Hematologic:  - neg hematologic  ROS       Musculoskeletal:  - neg musculoskeletal ROS       GI/Hepatic:     (+) GERD       Renal/Genitourinary:  - ROS Renal section negative       Endo: Comment: BMI-61.7      (+) thyroid problem Obesity, .      Psychiatric:  - neg psychiatric ROS       Infectious Disease:  - neg infectious disease ROS       Malignancy:         Other: Comment: .Lab Test        12/11/17                       0328          WBC          12.2*         HGB          13.0          MCV          81            PLT          397           INR          1.29*          Lab Test        12/11/17     12/16/15                       0328                            NA           134          140           POTASSIUM    3.8          5.1           CHLORIDE     102          103           CO2          24            --           BUN          14           13            CR           0.70         0.8           ANIONGAP     8             --           GERMAN          9.4          9.1           GLC          126*         96                                Physical Exam  Normal systems: cardiovascular    Airway   Mallampati: III    Dental     Cardiovascular   Rhythm and rate: regular and normal      Pulmonary (+) decreased breath sounds                       Anesthesia Plan      History & Physical Review  History and physical reviewed and following examination; no interval change.    ASA Status:  3 .        Plan for General, ETT and RSI with Intravenous induction. Maintenance will be Inhalation and Balanced.    PONV prophylaxis:  Ondansetron (or other 5HT-3) and Dexamethasone or Solumedrol       Postoperative  Care  Postoperative pain management:  IV analgesics, Oral pain medications and Multi-modal analgesia.      Consents  Anesthetic plan, risks, benefits and alternatives discussed with:  Patient or representative..                          .

## 2017-12-13 NOTE — IP AVS SNAPSHOT
MRN:[id number]                      After Visit Summary   12/13/2017    Makenzie Blanco    MRN: [id number]           Thank you!     Thank you for choosing [facility] for your care. Our goal is always to provide you with excellent care. Hearing back from our patients is one way we can continue to improve our services. Please take a few minutes to complete the written survey that you may receive in the mail after you visit. If you would like to speak to someone directly about your visit please contact Patient Relations at [phone]. Thank you!          Patient Information     Date Of Birth          1970        About your hospital stay     You were admitted on:  December 13, 2017 You last received care in the:  [facility] PreOP/PostOP    You were discharged on:  December 13, 2017       Who to Call     For medical emergencies, please call 911.  For non-urgent questions about your medical care, please call your primary care provider or clinic, [phone]  For questions related to your surgery, please call your surgery clinic        Attending Provider     Provider Specialty    Kvng Davidson MD General Surgery       Primary Care Provider Office Phone # Fax #    Srikanth Morris [phone]      Further instructions from your care team       HOME CARE FOLLOWING LAPAROSCOPIC CHOLECYSTECTOMY  GABY Long E. Gavin, N. Guttormson, D. Maurer, LIZBETH Elmore    INCISIONAL CARE:  Replace the bandage over your incisions until all drainage stops, or if more comfortable to have in place.  If present, leave the steri-strips (white paper tapes) in place for 14 days after surgery.  If Dermabond (a type of skin glue) is present, leave in place until it wears/flakes off.     BATHING:  Avoid baths for 1 week after surgery.  Showers are okay.  You may wash your hair at any time.  Gently pat your incisions dry after  bathing.    ACTIVITY:  Light Activity -- you may immediately be up and about as tolerated.  Driving -- you may drive when comfortable and off narcotic pain medications.  Light Work -- resume when comfortable off pain medications.  (If you can drive, you probably can work.)  Strenuous Work/Activity -- limit lifting to 20 pounds for 1 week.  Progressively increase with time.  Active Sports (running, biking, etc.) -- cautiously resume after 2 weeks.    DISCOMFORT:  Use pain medications as prescribed by your surgeon.  Take the pain medication with some food, when possible, to minimize side effects.  Intermittent use of ice packs at the incision sites may help during the first 48 hours.  Expect gradual improvement.  You may experience shoulder pain, which is due to the air placed within your abdomen during the procedure.  This is temporary and usually passes within 2 days.    DIET:  Drink plenty of fluids.  While taking pain medications, increase dietary fiber or add a fiber supplementation like Metamucil or Citrucel to help prevent constipation - a possible side effect of pain medications.  It is not uncommon to experience some bowel changes (loose stools or constipation) after surgery.  Your body has to adapt to you no longer having a gall bladder.  To help minimize this side effect, avoid fatty foods for the first week after surgery.  You may then slowly increase the amount of fatty foods in your diet.      NAUSEA:  If nauseated from the anesthetic/pain meds; rest in bed, get up cautiously with assistance, and drink clear liquids (juice, tea, broth).    RETURN APPOINTMENT:  Schedule a follow-up visit 2-3 weeks post-op.  Office Phone:  [phone]     CONTACT US IF THE FOLLOWING DEVELOPS:   1. A fever that is above 101     2. If there is a large amount of drainage, bleeding, or swelling.   3. Severe pain that is not relieved by your prescription.   4. Drainage that is thick, cloudy, yellow, green or white.   5. Any  other questions not answered by  Frequently Asked Questions  sheet.      FREQUENTLY ASKED QUESTIONS:    Q:  How should my incision look?    A:  Normally your incision will appear slightly swollen with light redness directly along the incision itself as it heals.  It may feel like a bump or ridge as the healing/scarring happens, and over time (3-4 months) this bump or ridge feeling should slowly go away.  In general, clear or pink watery drainage can be normal at first as your incision heals, but should decrease over time.    Q:  How do I know if my incision is infected?  A:  Look at your incision for signs of infection, like redness around the incision spreading to surrounding skin, or drainage of cloudy or foul-smelling drainage.  If you feel warm, check your temperature to see if you are running a fever.    **If any of these things occur, please notify the nurse at our office.  We may need you to come into the office for an incision check.      Q:  How do I take care of my incision?  A:  If you have a dressing in place - Starting the day after surgery, replace the dressing 1-2 times a day until there is no further drainage from the incision.  At that time, a dressing is no longer needed.  Try to minimize tape on the skin if irritation is occurring at the tape sites.  If you have significant irritation from tape on the skin, please call the office to discuss other method of dressing your incision.    Small pieces of tape called  steri-strips  may be present directly overlying your incision; these may be removed 10 days after surgery unless otherwise specified by your surgeon.  If these tapes start to loosen at the ends, you may trim them back until they fall off or are removed.    A:  If you had  Dermabond  tissue glue used as a dressing (this causes your incision to look shiny with a clear covering over it) - This type of dressing wears off with time and does not require more dressings over the top unless it is  draining around the glue as it wears off.  Do not apply ointments or lotions over the incisions until the glue has completely worn off.    Q:  There is a piece of tape or a sticky  lead  still on my skin.  Can I remove this?  A:  Sometimes the sticky  leads  used for monitoring during surgery or for evaluation in the emergency department are not all removed while you are in the hospital.  These sometimes have a tab or metal dot on them.  You can easily remove these on your own, like taking off a band-aid.  If there is a gel substance under the  lead , simply wipe/clean it off with a washcloth or paper towel.      Q:  What can I do to minimize constipation (very hard stools, or lack of stools)?  A:  Stay well hydrated.  Increase your dietary fiber intake or take a fiber supplement -with plenty of water.  Walk around frequently.  You may consider an over-the-counter stool-softener.  Your Pharmacist can assist you with choosing one that is stocked at your pharmacy.  Constipation is also one of the most common side effects of pain medication.  If you are using pain medication, be pro-active and try to PREVENT problems with constipation by taking the steps above BEFORE constipation becomes a problem.    Q:  What do I do if I need more pain medications?  A:  Call the office to receive refills.  Be aware that certain pain meds cannot be called into a pharmacy and actually require a paper prescription.  A change may be made in your pain med as you progress thru your recovery period or if you have side effects to certain meds.    --Pain meds are NOT refilled after 5pm on weekdays, and NOT AT ALL on the weekends, so please look ahead to prevent problems.      Q:  Why am I having a hard time sleeping now that I am at home?  A:  Many medications you receive while you are in the hospital can impact your sleep for a number of days after your surgery/hospitalization.  Decreased level of activity and naps during the day may also  make sleeping at night difficult.  Try to minimize day-time naps, and get up frequently during the day to walk around your home during your recovery time.  Sleep aides may be of some help, but are not recommended for long-term use.      Q:  I am having some back discomfort.  What should I do?  A:  This may be related to certain positioning that was required for your surgery, extended periods of time in bed, or other changes in your overall activity level.  You may try ice, heat, acetaminophen, or ibuprofen to treat this temporarily.  Note that many pain medications have acetaminophen in them and would state this on the prescription bottle.  Be sure not to exceed the maximum of 4000mg per day of acetaminophen.     **If the pain you are having does not resolve, is severe, or is a flare of back pain you have had on other occasions prior to surgery, please contact your primary physician for further recommendations or for an appointment to be examined at their office.    Q:  Why am I having headaches?  A:  Headaches can be caused by many things:  caffeine withdrawal, use of pain meds, dehydration, high blood pressure, lack of sleep, over-activity/exhaustion, flare-up of usual migraine headaches.  If you feel this is related to muscle tension (a band-like feeling around the head, or a pressure at the low-back of the head) you may try ice or heat to this area.  You may need to drink more fluids (try electrolyte drink like Gatorade), rest, or take your usual migraine medications.   **If your headaches do not resolve, worsen, are accompanied by other symptoms, or if your blood pressure is high, please call your primary physician for recommendation and/or examination.    Q:  I am unable to urinate.  What do I do?  A:  A small percentage of people can have difficulty urinating initially after surgery.  This includes being able to urinate only a very small amount at a time and feeling discomfort or pressure in the very low  abdomen.  This is called  urinary retention , and is actually an urgent situation.  Proceed to your nearest Emergency department for evaluation (not an Urgent Care Center).  Sometimes the bladder does not work correctly after certain medications you receive during surgery, or related to certain procedures.  You may need to have a catheter placed until your bladder recovers.  When planning to go to an Emergency department, it may help to call the ER to let them know you are coming in for this problem after a surgery.  This may help you get in quicker to be evaluated.  **If you have symptoms of a urinary tract infection, please contact your primary physician for the proper evaluation and treatment.    If you have other questions, please call the office Monday thru Friday between 8am and 5pm to discuss with the nurse or physician assistant.  #[phone]    There is a surgeon ON CALL on weekday evenings and over the weekend in case of urgent need only, and may be contacted at the same number.    If you are having an emergency, call 911 or proceed to your nearest emergency department.    GENERAL ANESTHESIA OR SEDATION ADULT DISCHARGE INSTRUCTIONS   SPECIAL PRECAUTIONS FOR 24 HOURS AFTER SURGERY    IT IS NOT UNUSUAL TO FEEL LIGHT-HEADED OR FAINT, UP TO 24 HOURS AFTER SURGERY OR WHILE TAKING PAIN MEDICATION.  IF YOU HAVE THESE SYMPTOMS; SIT FOR A FEW MINUTES BEFORE STANDING AND HAVE SOMEONE ASSIST YOU WHEN YOU GET UP TO WALK OR USE THE BATHROOM.    YOU SHOULD REST AND RELAX FOR THE NEXT 24 HOURS AND YOU MUST MAKE ARRANGEMENTS TO HAVE SOMEONE STAY WITH YOU FOR AT LEAST 24 HOURS AFTER YOUR DISCHARGE.  AVOID HAZARDOUS AND STRENUOUS ACTIVITIES.  DO NOT MAKE IMPORTANT DECISIONS FOR 24 HOURS.    DO NOT DRIVE ANY VEHICLE OR OPERATE MECHANICAL EQUIPMENT FOR 24 HOURS FOLLOWING THE END OF YOUR SURGERY.  EVEN THOUGH YOU MAY FEEL NORMAL, YOUR REACTIONS MAY BE AFFECTED BY THE MEDICATION YOU HAVE RECEIVED.    DO NOT DRINK ALCOHOLIC  "BEVERAGES FOR 24 HOURS FOLLOWING YOUR SURGERY.    DRINK CLEAR LIQUIDS (APPLE JUICE, GINGER ALE, 7-UP, BROTH, ETC.).  PROGRESS TO YOUR REGULAR DIET AS YOU FEEL ABLE.    YOU MAY HAVE A DRY MOUTH, A SORE THROAT, MUSCLES ACHES OR TROUBLE SLEEPING.  THESE SHOULD GO AWAY AFTER 24 HOURS.    CALL YOUR DOCTOR FOR ANY OF THE FOLLOWING:  SIGNS OF INFECTION (FEVER, GROWING TENDERNESS AT THE SURGERY SITE, A LARGE AMOUNT OF DRAINAGE OR BLEEDING, SEVERE PAIN, FOUL-SMELLING DRAINAGE, REDNESS OR SWELLING.    IT HAS BEEN OVER 8 TO 10 HOURS SINCE SURGERY AND YOU ARE STILL NOT ABLE TO URINATE (PASS WATER).     You received Toradol, an IV form of ibuprofen (Motrin) at 1pm.  Do not take any ibuprofen products until 7pm.          Pending Results     Date and Time Order Name Status Description    12/13/2017 1119 Surgical pathology exam In process             Admission Information     Date & Time Provider Department Dept. Phone    12/13/2017 Kvng Davidson MD [facility] PreOP/PostOP [phone]      Your Vitals Were     Blood Pressure Temperature Respirations Height Weight Pulse Oximetry    133/68 (BP Location: Other (Comment)) 97.1  F (36.2  C) (Temporal) 11 1.651 m (5' 5\") 179 kg (394 lb 9.6 oz) 92%    BMI (Body Mass Index)                   65.66 kg/m2           [employer] Information     [employer] gives you secure access to your electronic health record. If you see a primary care provider, you can also send messages to your care team and make appointments. If you have questions, please call your primary care clinic.  If you do not have a primary care provider, please call [phone] and they will assist you.        Care EveryWhere ID     This is your Care EveryWhere ID. This could be used by other organizations to access your [place] medical records  [id number]        Equal Access to Services     GUSTABO [place] AH: rob Card, maria isabel mckeon " laaly ahDarwin So [facility] [phone].    ATENCIÓN: Si habla liane, tiene a shah disposición servicios gratuitos de asistencia lingüística. Mike al [phone].    We comply with applicable federal civil rights laws and [place] laws. We do not discriminate on the basis of race, color, national origin, age, disability, sex, sexual orientation, or gender identity.               Review of your medicines      START taking        Dose / Directions    HYDROcodone-acetaminophen 5-325 MG per tablet   Commonly known as:  NORCO   Used for:  Gallbladder hydrops        Dose:  1-2 tablet   Take 1-2 tablets by mouth every 4 hours as needed for other (Moderate to Severe Pain)   Quantity:  30 tablet   Refills:  0         CONTINUE these medicines which have NOT CHANGED        Dose / Directions    amoxicillin-clavulanate 875-125 MG per tablet   Commonly known as:  AUGMENTIN   Used for:  Symptomatic cholelithiasis        Dose:  1 tablet   Take 1 tablet by mouth 2 times daily for 4 doses   Quantity:  4 tablet   Refills:  0       apixaban ANTICOAGULANT 5 MG tablet   Commonly known as:  ELIQUIS   Used for:  Atrial flutter, unspecified type (H), Palpitations, Benign essential hypertension        Dose:  5 mg   Start taking on:  12/14/2017   Take 1 tablet (5 mg) by mouth 2 times daily   Quantity:  180 tablet   Refills:  1       atenolol 50 MG tablet   Commonly known as:  TENORMIN        Dose:  50 mg   Take 50 mg by mouth daily   Refills:  0       HYDROmorphone 4 MG tablet   Commonly known as:  DILAUDID   Used for:  Symptomatic cholelithiasis        Dose:  4 mg   Take 1 tablet (4 mg) by mouth every 4 hours as needed for moderate to severe pain maximum 6 tablet(s) per day   Quantity:  12 tablet   Refills:  0       SYNTHROID 200 MCG tablet   Generic drug:  levothyroxine        Dose:  200 mcg   Take 200 mcg by mouth daily   Refills:  0       triamterene-hydrochlorothiazide 37.5-25 MG per tablet   Commonly known as:  MAXZIDE-25        Dose:  1  tablet   Take 1 tablet by mouth daily   Refills:  0            Where to get your medicines      Some of these will need a paper prescription and others can be bought over the counter. Ask your nurse if you have questions.     Bring a paper prescription for each of these medications     HYDROcodone-acetaminophen 5-325 MG per tablet                Protect others around you: Learn how to safely use, store and throw away your medicines at www.disposemymeds.org.             Medication List: This is a list of all your medications and when to take them. Check marks below indicate your daily home schedule. Keep this list as a reference.      Medications           Morning Afternoon Evening Bedtime As Needed    amoxicillin-clavulanate 875-125 MG per tablet   Commonly known as:  AUGMENTIN   Take 1 tablet by mouth 2 times daily for 4 doses                                apixaban ANTICOAGULANT 5 MG tablet   Commonly known as:  ELIQUIS   Take 1 tablet (5 mg) by mouth 2 times daily   Start taking on:  12/14/2017                                atenolol 50 MG tablet   Commonly known as:  TENORMIN   Take 50 mg by mouth daily                                HYDROcodone-acetaminophen 5-325 MG per tablet   Commonly known as:  NORCO   Take 1-2 tablets by mouth every 4 hours as needed for other (Moderate to Severe Pain)   Last time this was given:  1 tablet on 12/13/2017  1:35 PM                                HYDROmorphone 4 MG tablet   Commonly known as:  DILAUDID   Take 1 tablet (4 mg) by mouth every 4 hours as needed for moderate to severe pain maximum 6 tablet(s) per day                                SYNTHROID 200 MCG tablet   Take 200 mcg by mouth daily   Generic drug:  levothyroxine                                triamterene-hydrochlorothiazide 37.5-25 MG per tablet   Commonly known as:  MAXZIDE-25   Take 1 tablet by mouth daily

## 2017-12-13 NOTE — OP NOTE
DATE OF PROCEDURE:  12/13/2017      PREOPERATIVE DIAGNOSIS:  Biliary colic.      POSTOPERATIVE DIAGNOSIS:  Acute on chronic cholecystitis with hydrops of the gallbladder.      PROCEDURE:  Laparoscopic cholecystectomy.      ANESTHESIA:  General plus local.      SURGEON:  Kvng Dominguez MD      ASSISTANT:  Maya Lemus PA-C, whose expertise in exposure, retraction, suturing and cutting of suture and identification of critical structures was medically necessary for the operation.      SPECIMENS:  Gallbladder and contents.      COMPLICATIONS:  None.      INDICATIONS:  Ms. Makenzie Blanco is a 47-year-old female who I met on Monday of this week who presented with signs and symptoms consistent with biliary colic.  At the time of consultation, the patient has been on Eliquis taking her most recent dose the evening before and had near resolution of her symptoms.  Because of this, I offered and recommended cholecystectomy after an interval of greater than 48 hours of holding her medications.  She presented today for this surgery.  Risks of procedure including infection, bleeding, harm to adjacent structures, open conversion, retained stone, bile leak, common duct injury as well as chronic diarrhea were all reviewed with the patient.  She verbalized understanding of the above and consented to proceed.      FINDINGS:  The patient had a tensely distended gallbladder filled with hydropic bile and multiple cholesterol type stones.  There was acute on chronic edema of the gallbladder wall.  There were portions of the gallbladder which were thin and necrotic.  Critical view of safety was obtained; however, the tissue just above the cystic duct was avulsed during the course of the dissection, the cystic duct stump had no bilious back flow and no stones beyond a single one and retrieved from it.  This was closed with an Endoloop.      DESCRIPTION OF PROCEDURE:  With the patient under excellent general anesthesia in supine position,  abdomen was prepped and draped in the usual sterile surgical fashion.  Timeout was then performed confirming the patient, procedure to be done as well as drug allergies.  She did receive a dose of Ancef prior to incision for infection prophylaxis.  We began by making a short longitudinal incision at the superior umbilical skin fold and dissected with electrocautery and blunt dissection down to the level of the epigastric fascia.  We placed stay stitches on the fascia after incising it longitudinally and punctured the peritoneum bluntly with a Carmalt clamp.  We introduced the Juan David trocar through this defect applied pneumoperitoneum and placed the patient in reverse Trendelenburg position and rolled slightly to her left.  We placed 3 further 5 mm ports in the epigastrium and right upper quadrant.  The gallbladder was visualized and found to be tensely distended.  We aspirated approximately 30 mL of clear bile from the gallbladder to allow us to elevate it into view.  It was also noted to be edematous and mildly thickened.  We identified the infundibulum and grasped and retracted this upward and laterally.  We incised the serosa and fatty tissues on the medial and lateral aspects of the infundibulum and swept these tissues downwards.  This exposed the presumptive artery which was coming anterior and slightly medial to the infundibulum.  This was bluntly surrounded and clipped and divided, the junction of the infundibulum and cystic duct was identified just deep to this.  We bluntly surrounded the cystic duct, but during the course of elevating it into view the poor quality of the tissues and possible necrosis from her edema and inflammation caused a portion of these to be avulsed.  We then sharply cut the remainder of the cuff of the cystic duct free and closed this with an 0 Vicryl Endoloop.  This was after removing the stone from the proximal cystic duct.  There was no bilious back flow observed, suggesting also  chronic obstruction of the cystic duct consistent with the previously noted hydrops.  We then dissected the gallbladder free from the gallbladder fossa with electrocautery and placed the resultant specimen in an Endocatch pouch.  The right upper quadrant was then copiously irrigated and suctioned dry and found to be satisfactorily hemostatic.  Final survey of the upper abdomen was made, the upper ports removed as was the Juan David and pneumoperitoneum was released.  The fascia was then closed with an 0 Vicryl stitch in a figure-of-eight fashion x2.  Stay sutures were then tied over these.  30 mL of 0.5% Marcaine were instilled in all the incisions.  Skin was closed with 4-0 Vicryls in a deep dermal interrupted fashion.  Steri-Strips were applied atop the wounds.  The patient tolerated the procedure well, was extubated and brought to recovery in excellent condition.  All sharps and sponge counts were correct at the conclusion of the case.         JOSE RAFAEL ALVAREZ MD             D: 2017 12:52   T: 2017 15:54   MT: [id number]      Name:     PHILIPP ALLEN   MRN:      [id number]        Account:        [id number]   :      1970           Procedure Date: 2017      Document: [id number]       cc: Srikanth Morris MD

## 2017-12-13 NOTE — IP AVS SNAPSHOT
[facility] PreOP/PostOP    [address]    [facility] [address]    Phone:  [phone]    Fax:  [phone]                                       After Visit Summary   12/13/2017    Makenzie Blanco    MRN: [id number]           After Visit Summary Signature Page     I have received my discharge instructions, and my questions have been answered. I have discussed any challenges I see with this plan with the nurse or doctor.    ..........................................................................................................................................  Patient/Patient Representative Signature      ..........................................................................................................................................  Patient Representative Print Name and Relationship to Patient    ..................................................               ................................................  Date                                            Time    ..........................................................................................................................................  Reviewed by Signature/Title    ...................................................              ..............................................  Date                                                            Time

## 2017-12-13 NOTE — BRIEF OP NOTE
[facility] Brief Operative Note    Pre-operative diagnosis: Cholelithiasis   Post-operative diagnosis Acute on chronic cholecystitis with hydrops   Procedure: Procedure(s):  LAPAROSCOPIC CHOLECYSTECTOMY  - Wound Class: II-Clean Contaminated   Surgeon(s): Surgeon(s) and Role:     * Kvng Davidson MD - Primary     * Maya Lemus PA-C   Estimated blood loss: 10 mL    Specimens:   ID Type Source Tests Collected by Time Destination   A : gallbladder and contents Tissue Gallbladder and Contents SURGICAL PATHOLOGY EXAM Kvng Davidson MD 12/13/2017 11:16 AM       Findings: Distended gallbladder with clear bile, stones, wall thickening with surrounding edema.

## 2017-12-13 NOTE — DISCHARGE INSTRUCTIONS
HOME CARE FOLLOWING LAPAROSCOPIC CHOLECYSTECTOMY  GABY Long E. Gavin, N. Guttormson, D. Maurer, COREY Martin, LIZBETH Estrada    INCISIONAL CARE:  Replace the bandage over your incisions until all drainage stops, or if more comfortable to have in place.  If present, leave the steri-strips (white paper tapes) in place for 14 days after surgery.  If Dermabond (a type of skin glue) is present, leave in place until it wears/flakes off.     BATHING:  Avoid baths for 1 week after surgery.  Showers are okay.  You may wash your hair at any time.  Gently pat your incisions dry after bathing.    ACTIVITY:  Light Activity -- you may immediately be up and about as tolerated.  Driving -- you may drive when comfortable and off narcotic pain medications.  Light Work -- resume when comfortable off pain medications.  (If you can drive, you probably can work.)  Strenuous Work/Activity -- limit lifting to 20 pounds for 1 week.  Progressively increase with time.  Active Sports (running, biking, etc.) -- cautiously resume after 2 weeks.    DISCOMFORT:  Use pain medications as prescribed by your surgeon.  Take the pain medication with some food, when possible, to minimize side effects.  Intermittent use of ice packs at the incision sites may help during the first 48 hours.  Expect gradual improvement.  You may experience shoulder pain, which is due to the air placed within your abdomen during the procedure.  This is temporary and usually passes within 2 days.    DIET:  Drink plenty of fluids.  While taking pain medications, increase dietary fiber or add a fiber supplementation like Metamucil or Citrucel to help prevent constipation - a possible side effect of pain medications.  It is not uncommon to experience some bowel changes (loose stools or constipation) after surgery.  Your body has to adapt to you no longer having a gall bladder.  To help minimize this side effect, avoid fatty foods for the first week after surgery.   You may then slowly increase the amount of fatty foods in your diet.      NAUSEA:  If nauseated from the anesthetic/pain meds; rest in bed, get up cautiously with assistance, and drink clear liquids (juice, tea, broth).    RETURN APPOINTMENT:  Schedule a follow-up visit 2-3 weeks post-op.  Office Phone:  [phone]     CONTACT US IF THE FOLLOWING DEVELOPS:   1. A fever that is above 101     2. If there is a large amount of drainage, bleeding, or swelling.   3. Severe pain that is not relieved by your prescription.   4. Drainage that is thick, cloudy, yellow, green or white.   5. Any other questions not answered by  Frequently Asked Questions  sheet.      FREQUENTLY ASKED QUESTIONS:    Q:  How should my incision look?    A:  Normally your incision will appear slightly swollen with light redness directly along the incision itself as it heals.  It may feel like a bump or ridge as the healing/scarring happens, and over time (3-4 months) this bump or ridge feeling should slowly go away.  In general, clear or pink watery drainage can be normal at first as your incision heals, but should decrease over time.    Q:  How do I know if my incision is infected?  A:  Look at your incision for signs of infection, like redness around the incision spreading to surrounding skin, or drainage of cloudy or foul-smelling drainage.  If you feel warm, check your temperature to see if you are running a fever.    **If any of these things occur, please notify the nurse at our office.  We may need you to come into the office for an incision check.      Q:  How do I take care of my incision?  A:  If you have a dressing in place - Starting the day after surgery, replace the dressing 1-2 times a day until there is no further drainage from the incision.  At that time, a dressing is no longer needed.  Try to minimize tape on the skin if irritation is occurring at the tape sites.  If you have significant irritation from tape on the skin, please  call the office to discuss other method of dressing your incision.    Small pieces of tape called  steri-strips  may be present directly overlying your incision; these may be removed 10 days after surgery unless otherwise specified by your surgeon.  If these tapes start to loosen at the ends, you may trim them back until they fall off or are removed.    A:  If you had  Dermabond  tissue glue used as a dressing (this causes your incision to look shiny with a clear covering over it) - This type of dressing wears off with time and does not require more dressings over the top unless it is draining around the glue as it wears off.  Do not apply ointments or lotions over the incisions until the glue has completely worn off.    Q:  There is a piece of tape or a sticky  lead  still on my skin.  Can I remove this?  A:  Sometimes the sticky  leads  used for monitoring during surgery or for evaluation in the emergency department are not all removed while you are in the hospital.  These sometimes have a tab or metal dot on them.  You can easily remove these on your own, like taking off a band-aid.  If there is a gel substance under the  lead , simply wipe/clean it off with a washcloth or paper towel.      Q:  What can I do to minimize constipation (very hard stools, or lack of stools)?  A:  Stay well hydrated.  Increase your dietary fiber intake or take a fiber supplement -with plenty of water.  Walk around frequently.  You may consider an over-the-counter stool-softener.  Your Pharmacist can assist you with choosing one that is stocked at your pharmacy.  Constipation is also one of the most common side effects of pain medication.  If you are using pain medication, be pro-active and try to PREVENT problems with constipation by taking the steps above BEFORE constipation becomes a problem.    Q:  What do I do if I need more pain medications?  A:  Call the office to receive refills.  Be aware that certain pain meds cannot be  called into a pharmacy and actually require a paper prescription.  A change may be made in your pain med as you progress thru your recovery period or if you have side effects to certain meds.    --Pain meds are NOT refilled after 5pm on weekdays, and NOT AT ALL on the weekends, so please look ahead to prevent problems.      Q:  Why am I having a hard time sleeping now that I am at home?  A:  Many medications you receive while you are in the hospital can impact your sleep for a number of days after your surgery/hospitalization.  Decreased level of activity and naps during the day may also make sleeping at night difficult.  Try to minimize day-time naps, and get up frequently during the day to walk around your home during your recovery time.  Sleep aides may be of some help, but are not recommended for long-term use.      Q:  I am having some back discomfort.  What should I do?  A:  This may be related to certain positioning that was required for your surgery, extended periods of time in bed, or other changes in your overall activity level.  You may try ice, heat, acetaminophen, or ibuprofen to treat this temporarily.  Note that many pain medications have acetaminophen in them and would state this on the prescription bottle.  Be sure not to exceed the maximum of 4000mg per day of acetaminophen.     **If the pain you are having does not resolve, is severe, or is a flare of back pain you have had on other occasions prior to surgery, please contact your primary physician for further recommendations or for an appointment to be examined at their office.    Q:  Why am I having headaches?  A:  Headaches can be caused by many things:  caffeine withdrawal, use of pain meds, dehydration, high blood pressure, lack of sleep, over-activity/exhaustion, flare-up of usual migraine headaches.  If you feel this is related to muscle tension (a band-like feeling around the head, or a pressure at the low-back of the head) you may try ice  or heat to this area.  You may need to drink more fluids (try electrolyte drink like Gatorade), rest, or take your usual migraine medications.   **If your headaches do not resolve, worsen, are accompanied by other symptoms, or if your blood pressure is high, please call your primary physician for recommendation and/or examination.    Q:  I am unable to urinate.  What do I do?  A:  A small percentage of people can have difficulty urinating initially after surgery.  This includes being able to urinate only a very small amount at a time and feeling discomfort or pressure in the very low abdomen.  This is called  urinary retention , and is actually an urgent situation.  Proceed to your nearest Emergency department for evaluation (not an Urgent Care Center).  Sometimes the bladder does not work correctly after certain medications you receive during surgery, or related to certain procedures.  You may need to have a catheter placed until your bladder recovers.  When planning to go to an Emergency department, it may help to call the ER to let them know you are coming in for this problem after a surgery.  This may help you get in quicker to be evaluated.  **If you have symptoms of a urinary tract infection, please contact your primary physician for the proper evaluation and treatment.    If you have other questions, please call the office Monday thru Friday between 8am and 5pm to discuss with the nurse or physician assistant.  #[phone]    There is a surgeon ON CALL on weekday evenings and over the weekend in case of urgent need only, and may be contacted at the same number.    If you are having an emergency, call 911 or proceed to your nearest emergency department.    GENERAL ANESTHESIA OR SEDATION ADULT DISCHARGE INSTRUCTIONS   SPECIAL PRECAUTIONS FOR 24 HOURS AFTER SURGERY    IT IS NOT UNUSUAL TO FEEL LIGHT-HEADED OR FAINT, UP TO 24 HOURS AFTER SURGERY OR WHILE TAKING PAIN MEDICATION.  IF YOU HAVE THESE SYMPTOMS;  SIT FOR A FEW MINUTES BEFORE STANDING AND HAVE SOMEONE ASSIST YOU WHEN YOU GET UP TO WALK OR USE THE BATHROOM.    YOU SHOULD REST AND RELAX FOR THE NEXT 24 HOURS AND YOU MUST MAKE ARRANGEMENTS TO HAVE SOMEONE STAY WITH YOU FOR AT LEAST 24 HOURS AFTER YOUR DISCHARGE.  AVOID HAZARDOUS AND STRENUOUS ACTIVITIES.  DO NOT MAKE IMPORTANT DECISIONS FOR 24 HOURS.    DO NOT DRIVE ANY VEHICLE OR OPERATE MECHANICAL EQUIPMENT FOR 24 HOURS FOLLOWING THE END OF YOUR SURGERY.  EVEN THOUGH YOU MAY FEEL NORMAL, YOUR REACTIONS MAY BE AFFECTED BY THE MEDICATION YOU HAVE RECEIVED.    DO NOT DRINK ALCOHOLIC BEVERAGES FOR 24 HOURS FOLLOWING YOUR SURGERY.    DRINK CLEAR LIQUIDS (APPLE JUICE, GINGER ALE, 7-UP, BROTH, ETC.).  PROGRESS TO YOUR REGULAR DIET AS YOU FEEL ABLE.    YOU MAY HAVE A DRY MOUTH, A SORE THROAT, MUSCLES ACHES OR TROUBLE SLEEPING.  THESE SHOULD GO AWAY AFTER 24 HOURS.    CALL YOUR DOCTOR FOR ANY OF THE FOLLOWING:  SIGNS OF INFECTION (FEVER, GROWING TENDERNESS AT THE SURGERY SITE, A LARGE AMOUNT OF DRAINAGE OR BLEEDING, SEVERE PAIN, FOUL-SMELLING DRAINAGE, REDNESS OR SWELLING.    IT HAS BEEN OVER 8 TO 10 HOURS SINCE SURGERY AND YOU ARE STILL NOT ABLE TO URINATE (PASS WATER).     You received Toradol, an IV form of ibuprofen (Motrin) at 1pm.  Do not take any ibuprofen products until 7pm.

## 2017-12-14 LAB — COPATH REPORT: NORMAL

## 2017-12-18 ENCOUNTER — TELEPHONE (OUTPATIENT)
Dept: SURGERY | Facility: CLINIC | Age: 47
End: 2017-12-18

## 2017-12-18 NOTE — TELEPHONE ENCOUNTER
GENERAL SURGERY NURSE PHONE TRIAGE   Makenzie Blanco    MRN# [id number]  AGE:  47 year old  YOB: 1970  [phone] (home)   Surgeon: Dr. Dominguez  Surgical Assist:  Maya Lemus PA-C     Surgery type: Laparoscopic Cholecystectomy     Surgery Date: December / 13 / 2017     POD: 5     CHIEF CONCERN:  Vaginal spotting     HISTORY OF PRESENT ILLNESS:     RN Phone Triage    Ms. Makenzie Blanco is a 47 year old female who underwent  Laparoscopic Cholecystectomy   with hydrops of the gallbladder on  December / 13 / 2017 with  Dr. Dominguez.  Spoke with Patient.     Health Status  Chief Complaint- Vaginal spotting- patient on Mirena and blood Elaquis.  Fevers/chills: Patient denies any fever or chills.  Nausea/Vomiting: Patient denies nausea/vomiting.  Eating/drinking: soft, bland, low fiber, low fat diet  Bowel habits: Patient reports having loose stool BID  Diet- bowels were fine while eating only chicken soup- loose stools  Started after adding meat and green beans.  Incisions: Patient denies any signs and symptoms of infection.  Pain: No complaints of pain  Current Medications for pain? Tylenol (last dose of narcotic Friday 12/15/17 taking Tylenol,   unable to take NSAIDs    Activity/Restrictions  Patient returned to work yesterday.    PLAN:    Patient to notify prescribing physician of vaginal bleeding.  Decrease activity  Continue to chew foods thoroughly. Continue low fat, low fiber diet, advance as tolerated.  Routine PO appointment next week, call before, if any questions or concerns.  Kristin Alanis RN

## 2017-12-18 NOTE — TELEPHONE ENCOUNTER
Name of caller: Patient    Reason for Call:  Pt is spotting after surgery and is wondering is she should be concerned. She is on Mirena.     Surgeon:  Dr. Dominguez    Recent Surgery:  Yes.    If yes, when & what type:  Bradley mueller 12/13/17       Best phone number to reach pt at is: [phone]    Ok to leave a message with medical info? Yes.    Pharmacy preferred (if calling for a refill): N/A

## 2018-01-11 LAB
ALBUMIN SERPL-MCNC: 3.5 G/DL (ref 3.4–5)
ALP SERPL-CCNC: 92 U/L (ref 0–116)
ALT SERPL-CCNC: 25 U/L (ref 0–78)
ANION GAP SERPL CALCULATED.3IONS-SCNC: NORMAL MMOL/L
AST SERPL-CCNC: 20 U/L (ref 0–37)
BILIRUB SERPL-MCNC: 0.58 MG/DL (ref 0.2–1)
BUN SERPL-MCNC: 10 MG/DL (ref 7–18)
CALCIUM SERPL-MCNC: 9.5 MG/DL (ref 8.5–10.1)
CHLORIDE SERPLBLD-SCNC: 100 MMOL/L (ref 98–107)
CO2 SERPL-SCNC: NORMAL MMOL/L
CREAT SERPL-MCNC: 0.87 MG/DL (ref 0.6–1.3)
GFR SERPL CREATININE-BSD FRML MDRD: NORMAL ML/MIN/1.73M2
GLUCOSE SERPL-MCNC: 83 MG/DL (ref 70–99)
HEMOGLOBIN: 12.7 G/DL (ref 12–15.5)
POTASSIUM SERPL-SCNC: 4.7 MMOL/L (ref 3.5–5.1)
PROT SERPL-MCNC: 7.5 G/DL (ref 6.4–8.2)
SODIUM SERPL-SCNC: 138 MMOL/L (ref 136–145)
TSH SERPL-ACNC: 3.1 MCU/ML (ref 0.36–3.74)

## 2018-03-06 ENCOUNTER — HOSPITAL ENCOUNTER (OUTPATIENT)
Dept: CARDIOLOGY | Facility: CLINIC | Age: 48
Discharge: HOME | End: 2018-03-06
Attending: INTERNAL MEDICINE | Admitting: INTERNAL MEDICINE
Payer: COMMERCIAL

## 2018-03-06 DIAGNOSIS — I10 BENIGN ESSENTIAL HYPERTENSION: ICD-10-CM

## 2018-03-06 DIAGNOSIS — I48.92 ATRIAL FLUTTER, UNSPECIFIED TYPE (H): ICD-10-CM

## 2018-03-06 DIAGNOSIS — R00.2 PALPITATIONS: ICD-10-CM

## 2018-03-06 PROCEDURE — 40000264 ECHO COMPLETE WITH OPTISON

## 2018-03-06 PROCEDURE — 93306 TTE W/DOPPLER COMPLETE: CPT | Mod: 26 | Performed by: INTERNAL MEDICINE

## 2018-03-06 PROCEDURE — 25500064 ZZH RX 255 OP 636: Performed by: INTERNAL MEDICINE

## 2018-03-06 RX ADMIN — HUMAN ALBUMIN MICROSPHERES AND PERFLUTREN 3 ML: 10; .22 INJECTION, SOLUTION INTRAVENOUS at 10:15

## 2018-03-30 ENCOUNTER — PRE VISIT (OUTPATIENT)
Dept: CARDIOLOGY | Facility: CLINIC | Age: 48
End: 2018-03-30

## 2018-03-30 ENCOUNTER — TELEPHONE (OUTPATIENT)
Dept: CARDIOLOGY | Facility: CLINIC | Age: 48
End: 2018-03-30

## 2018-03-30 NOTE — TELEPHONE ENCOUNTER
Follow up call to patient after reviewing with Dr Johnson patients request for ongoing use of celebrex 200mg po qd for treatment plan for her knees per her ortho MD.  Currently patient on eliquis 5mg po bid.  Dr Johnson ok for short term however wants patient to come in to discuss further.  Patient currently has appt scheduled with Dr Duque on 4/4.  Patient would like to see Dr Johnson however he does not go to [place] on regular basis.  Reviewed next available appt with Dr Johnson in [place] would be 5/9/18.  Patient requesting that appt on 4/4 be cancelled and appt made for patient to see Dr Johnson on 5/9.  At that time patient will discuss with him need to ongoing use of AC as she indicated she needs to get her knees healthy.  Patient had no further questions. RONNELL Avalos

## 2018-04-09 DIAGNOSIS — I10 BENIGN ESSENTIAL HYPERTENSION: ICD-10-CM

## 2018-04-09 DIAGNOSIS — I48.92 ATRIAL FLUTTER, UNSPECIFIED TYPE (H): ICD-10-CM

## 2018-04-09 DIAGNOSIS — R00.2 PALPITATIONS: ICD-10-CM

## 2018-04-30 ENCOUNTER — TELEPHONE (OUTPATIENT)
Dept: SURGERY | Facility: CLINIC | Age: 48
End: 2018-04-30

## 2018-04-30 NOTE — TELEPHONE ENCOUNTER
"Name of caller: Patient    Reason for Call:  Wants to know if they used a catheter during surgery     Surgeon:  Dr. Dominguez    Recent Surgery:  no    If yes, when & what type: lap ami 12/13/17       Best phone number to reach pt at is: [phone]   Ok to leave a message with medical info? Yes.    Pharmacy preferred (if calling for a refill): N/A      GENERAL SURGERY NURSE PHONE TRIAGE   Makenzie Blanco    MRN# [id number]  AGE:  48 year old  YOB: 1970  [phone] (home) [phone]      Surgeon: Dr. Dominguez  Surgical Assist:  Maya Lemus PA-C     Surgery type: Laparoscopic Cholecystectomy     Surgery Date: December / 13 / 2018     POD: approximately 4 months ago     CHIEF CONCERN:  Wants to know if she had a urinary catheter during surgery     HISTORY OF PRESENT ILLNESS:   Reviewed chart and also verified with MR-  No notes or charges for urinary catheter.    Patient's concern is now bleeding with urination.  She had called PD #5 with concerns of \"vaginal bleeding\"  Today she is concerned that she has bleeding from a bladder infection which she thinks could be from a catheter if she had one in surgery.    Patient has not discussed with PCP yet, has appointment tomorrow.    PLAN:   Patient will see PCP tomorrow and discuss concerns.  Kristin Alanis RN      "

## 2018-05-07 LAB
ANION GAP SERPL CALCULATED.3IONS-SCNC: 17.8 MMOL/L (ref 9–19)
BUN SERPL-MCNC: 12 MG/DL (ref 7–18)
CALCIUM SERPL-MCNC: 9.2 MG/DL (ref 8.5–10.1)
CHLORIDE SERPLBLD-SCNC: 101 MMOL/L (ref 98–107)
CO2 SERPL-SCNC: 26 MMOL/L (ref 21–32)
CREAT SERPL-MCNC: 0.8 MG/DL (ref 0.6–1.3)
GFR SERPL CREATININE-BSD FRML MDRD: NORMAL ML/MIN/1.73M2
GLUCOSE SERPL-MCNC: 87 MG/DL (ref 70–99)
POTASSIUM SERPL-SCNC: 4.8 MMOL/L (ref 3.5–5.1)
SODIUM SERPL-SCNC: 140 MMOL/L (ref 136–145)

## 2018-05-08 ENCOUNTER — TRANSFERRED RECORDS (OUTPATIENT)
Dept: HEALTH INFORMATION MANAGEMENT | Facility: CLINIC | Age: 48
End: 2018-05-08

## 2018-06-07 ENCOUNTER — TRANSFERRED RECORDS (OUTPATIENT)
Dept: HEALTH INFORMATION MANAGEMENT | Facility: CLINIC | Age: 48
End: 2018-06-07

## 2018-07-03 DIAGNOSIS — I10 BENIGN ESSENTIAL HYPERTENSION: Primary | ICD-10-CM

## 2018-07-03 RX ORDER — ATENOLOL 50 MG/1
50 TABLET ORAL DAILY
Qty: 90 TABLET | Refills: 0 | Status: SHIPPED | OUTPATIENT
Start: 2018-07-03 | End: 2018-10-02

## 2018-07-15 ENCOUNTER — APPOINTMENT (OUTPATIENT)
Dept: GENERAL RADIOLOGY | Facility: CLINIC | Age: 48
End: 2018-07-15
Attending: EMERGENCY MEDICINE
Payer: COMMERCIAL

## 2018-07-15 ENCOUNTER — HOSPITAL ENCOUNTER (EMERGENCY)
Facility: CLINIC | Age: 48
Discharge: HOME | End: 2018-07-15
Attending: EMERGENCY MEDICINE | Admitting: EMERGENCY MEDICINE
Payer: COMMERCIAL

## 2018-07-15 VITALS
RESPIRATION RATE: 16 BRPM | DIASTOLIC BLOOD PRESSURE: 92 MMHG | OXYGEN SATURATION: 97 % | SYSTOLIC BLOOD PRESSURE: 148 MMHG | TEMPERATURE: 98.3 F

## 2018-07-15 DIAGNOSIS — S46.811A TRAPEZIUS STRAIN, RIGHT, INITIAL ENCOUNTER: ICD-10-CM

## 2018-07-15 LAB
ALBUMIN UR-MCNC: NEGATIVE MG/DL
APPEARANCE UR: CLEAR
BACTERIA #/AREA URNS HPF: ABNORMAL /HPF
BILIRUB UR QL STRIP: NEGATIVE
COLOR UR AUTO: ABNORMAL
GLUCOSE UR STRIP-MCNC: NEGATIVE MG/DL
HGB UR QL STRIP: NEGATIVE
KETONES UR STRIP-MCNC: NEGATIVE MG/DL
LEUKOCYTE ESTERASE UR QL STRIP: NEGATIVE
NITRATE UR QL: NEGATIVE
PH UR STRIP: 7 PH (ref 5–7)
RBC #/AREA URNS AUTO: 0 /HPF (ref 0–2)
SOURCE: ABNORMAL
SP GR UR STRIP: 1.01 (ref 1–1.03)
SQUAMOUS #/AREA URNS AUTO: 2 /HPF (ref 0–1)
UROBILINOGEN UR STRIP-MCNC: 0 MG/DL (ref 0–2)
WBC #/AREA URNS AUTO: 1 /HPF (ref 0–5)

## 2018-07-15 PROCEDURE — 71046 X-RAY EXAM CHEST 2 VIEWS: CPT

## 2018-07-15 PROCEDURE — 99284 EMERGENCY DEPT VISIT MOD MDM: CPT | Mod: 25

## 2018-07-15 PROCEDURE — 81001 URINALYSIS AUTO W/SCOPE: CPT | Performed by: EMERGENCY MEDICINE

## 2018-07-15 RX ORDER — CYCLOBENZAPRINE HCL 10 MG
10 TABLET ORAL 3 TIMES DAILY PRN
Qty: 15 TABLET | Refills: 0 | Status: SHIPPED | OUTPATIENT
Start: 2018-07-15 | End: 2018-09-05

## 2018-07-15 ASSESSMENT — ENCOUNTER SYMPTOMS
NUMBNESS: 0
FLANK PAIN: 0
DYSURIA: 0
DIARRHEA: 0
COUGH: 0
NAUSEA: 0
VOMITING: 0
FEVER: 0
BACK PAIN: 1
NERVOUS/ANXIOUS: 1
CONSTIPATION: 1
SHORTNESS OF BREATH: 0

## 2018-07-15 NOTE — ED PROVIDER NOTES
History     Chief Complaint:  Back Pain    HPI   Makenzie Blanco is a 48 year old female who presents to the emergency department with concerns for back pain. The patient reports that three nights ago, she was laying down in bed when she began to develop some right sided thoracic back pain. She states that this pain is similar to when she had a gallstones, though she has had a cholecystectomy. Thus, she was somewhat concerned about kidney stones, so she presented to the ED. Here in the ED, she also notes that she has had difficulty sleeping secondary to the pain, but tylenol improved her symptoms. Her pain is reproducible to palpation. She adds that she has had some constipation and anxiety relating to her pain. She denies fevers, nausea, vomiting, diarrhea, dysuria, flank pain, coughing, chest pain, shortness of breath, numbness, tingling, or pain with right arm movement. Of note, she was diagnosed a month ago with microscopic hematuria, but has not followed up with urology for this. Additionally, she was told that her kidney function was normal at that time.    Allergies:  NKDA    Medications:    Eliquis  Tenormin  Synthroid  Maxzide    Past Medical History:    Anxiety  Atrial flutter  Cardiomyopathy  Chronic ear infection  GERD  HTN  Hypothyroid  LVH  Morbid obesity  MILTON  Adjustment disorder     Past Surgical History:    Dental extractions  GYN surgery  Cholecystectomy    Family History:    HTN    Social History:  Marital Status:   [2]  Negative for tobacco use.  Alcohol: occasional    Review of Systems   Constitutional: Negative for fever.   Respiratory: Negative for cough and shortness of breath.    Cardiovascular: Negative for chest pain.   Gastrointestinal: Positive for constipation. Negative for diarrhea, nausea and vomiting.   Genitourinary: Negative for dysuria and flank pain.   Musculoskeletal: Positive for back pain.        No shoulder pain     Neurological: Negative for numbness.    Psychiatric/Behavioral: The patient is nervous/anxious.    All other systems reviewed and are negative.    Physical Exam     Patient Vitals for the past 24 hrs:   BP Temp Temp src Heart Rate Resp SpO2 Height Weight   07/15/18 1241 (!) 148/92 - - 69 16 97 % - -   07/15/18 1100 (!) 182/91 98.3  F (36.8  C) Temporal 67 16 97 % - -     Physical Exam  Nursing note and vitals reviewed.  Constitutional: Cooperative.   HENT:   Mouth/Throat: Moist mucous membranes.   Eyes: EOMI, nonicteric sclera  Cardiovascular: Normal rate, regular rhythm, no murmurs, rubs, or gallops  Pulmonary/Chest: Effort normal and breath sounds normal. No respiratory distress. No wheezes. No rales.   Abdominal: Soft. Nontender, nondistended, no guarding or rigidity. BS present. No CVA tenderness.   Musculoskeletal: Normal range of motion. Pain to palpation along right medial border of the scapula, consistent with middle/lower trapezius.   Neurological: Alert. Moves all extremities spontaneously.   Skin: Skin is warm and dry. No rash noted.   Psychiatric: Normal mood and affect.       Emergency Department Course   Imaging:  Radiographic findings were communicated with the patient who voiced understanding of the findings.  XR Chest PA & LAT:   Cardiac size is at the upper limits of normal. The lungs are clear. No evidence of pneumothorax or pleural effusion. Mild degenerative changes in the thoracic spine, as per radiology.     Laboratory:  UA with micro: Bacteria: few (A), Squamous Epithelial/HPF: 2 (H), o/w WNL    Emergency Department Course:  Nursing notes and vitals reviewed. (3802) I performed an exam of the patient as documented above.     The patient provided a urine sample here in the emergency department. This was sent for laboratory testing, findings above.     The patient was sent for a chest X-ray while in the emergency department, findings above.     (1216) I rechecked the patient and discussed the results of her workup thus far.      Findings and plan explained to the Patient. Patient discharged home with instructions regarding supportive care, medications, and reasons to return. The importance of close follow-up was reviewed. The patient was prescribed Flexeril.    I personally reviewed the laboratory results with the Patient and answered all related questions prior to discharge.    Impression & Plan      Medical Decision Making:  Makenzie Blanco is a 48 year old female who presents with CC right-sided back pain. Broad differential considered including musculoskeletal strain, pneumothorax, kidney stone, UTI, ACS/PE, among many other etiologies. UA negative for infection and hematuria. Pain in wrong location for renal pathology. It is easily reproducible with palpation of trapezius muscle. This most likely represents strain. Will rx flexeril for treatment. F/u pcp if not improving. Consider PT at that time. She's d/c'd in stable condition.     Diagnosis:    ICD-10-CM   1. Trapezius strain, right, initial encounter S46.811A     Disposition:  discharged to home    Discharge Medications:   Details   cyclobenzaprine (FLEXERIL) 10 MG tablet Take 1 tablet (10 mg) by mouth 3 times daily as needed for muscle spasms, Disp-15 tablet, R-0, Local Print        Scribe Disclosure:  I, Ariana Bolanos, am serving as a scribe on 7/15/2018 at 11:55 AM to personally document services performed by Robinson Bazzi MD based on my observations and the provider's statements to me.     Timothy Jaeger  7/15/2018   [facility] EMERGENCY DEPARTMENT       Robinson Bazzi MD  07/17/18 5056

## 2018-07-15 NOTE — ED TRIAGE NOTES
"Middle back pain; more to the right side. Notes last time she had pain there it was her gallbladder and she did have that removed. States she just wants to be sure she does not have a \"..kidney stone or something\". Notes that she is awaiting a urology visit for Microscopic hematuria.   "

## 2018-07-15 NOTE — ED AVS SNAPSHOT
[facility] Emergency Department    [address] [facility] [address]    Phone:  [phone]    Fax:  [phone]                                       Makenzie Blanco   MRN: [id number]    Department:  [facility] Emergency Department   Date of Visit:  7/15/2018           Patient Information     Date Of Birth          1970        Your diagnoses for this visit were:     Trapezius strain, right, initial encounter        You were seen by Robinson Bazzi MD.      Follow-up Information     Follow up with Srikanth Morris MD. Schedule an appointment as soon as possible for a visit in 1 week.    Specialty:  Family Practice    Contact information:    [facility]  [address]  [facility] [address] [phone]          Follow up with [facility] Emergency Department.    Specialty:  EMERGENCY MEDICINE    Why:  As needed, If symptoms worsen    Contact information:    [address]  [facility] [address] [phone]      Discharge References/Attachments     MUSCLE STRAIN, EXTREMITY (ENGLISH)      Your next 10 appointments already scheduled     Sep 05, 2018  4:15 PM CDT   [facility] EP RETURN with Az Johnson MD   [facility] ([facility])    [address]  [facility] [address] [phone]              24 Hour Appointment Hotline       To make an appointment at any [facility], call [phone]). If you don't have a family doctor or clinic, we will help you find one. [facility] are conveniently located to serve the needs of you and your family.             Review of your medicines      START taking        Dose / Directions Last dose taken    cyclobenzaprine 10 MG tablet   Commonly known as:  FLEXERIL   Dose:  10 mg   Quantity:  15 tablet        Take 1 tablet (10 mg) by mouth 3 times daily as needed for muscle spasms    Refills:  0          Our records show that you are taking the medicines listed below. If these are incorrect, please call your family doctor or clinic.        Dose / Directions Last dose taken    apixaban ANTICOAGULANT 5 MG tablet   Commonly known as:  ELIQUIS   Dose:  5 mg   Quantity:  60 tablet        Take 1 tablet (5 mg) by mouth 2 times daily   Refills:  3        atenolol 50 MG tablet   Commonly known as:  TENORMIN   Dose:  50 mg   Quantity:  90 tablet        Take 1 tablet (50 mg) by mouth daily   Refills:  0        HYDROcodone-acetaminophen 5-325 MG per tablet   Commonly known as:  NORCO   Dose:  1-2 tablet   Quantity:  30 tablet        Take 1-2 tablets by mouth every 4 hours as needed for other (Moderate to Severe Pain)   Refills:  0        HYDROmorphone 4 MG tablet   Commonly known as:  DILAUDID   Dose:  4 mg   Quantity:  12 tablet        Take 1 tablet (4 mg) by mouth every 4 hours as needed for moderate to severe pain maximum 6 tablet(s) per day   Refills:  0        SYNTHROID 200 MCG tablet   Dose:  200 mcg   Generic drug:  levothyroxine        Take 200 mcg by mouth daily   Refills:  0        triamterene-hydrochlorothiazide 37.5-25 MG per tablet   Commonly known as:  MAXZIDE-25   Dose:  1 tablet        Take 1 tablet by mouth daily   Refills:  0                Prescriptions were sent or printed at these locations (1 Prescription)                   Other Prescriptions                Printed at Department/Unit printer (1 of 1)         cyclobenzaprine (FLEXERIL) 10 MG tablet                Procedures and tests performed during your visit     Chest XR,  PA & LAT    UA with Microscopic reflex to Culture      Orders Needing Specimen Collection     None      Pending Results     No orders found from 7/13/2018 to 7/16/2018.            Pending Culture Results     No orders found from 7/13/2018 to 7/16/2018.            Pending Results Instructions     If you had any lab results that were not finalized at the time of  your Discharge, you can call the ED Lab Result RN at [phone]. You will be contacted by this team for any positive Lab results or changes in treatment. The nurses are available 7 days a week from 10A to 6:30P.  You can leave a message 24 hours per day and they will return your call.        Test Results From Your Hospital Stay        7/15/2018 11:38 AM      Narrative     XR CHEST 2 VW 7/15/2018 11:30 AM     HISTORY: right-sided thoracic back pain;     COMPARISON: None        Impression     IMPRESSION: Cardiac size is at the upper limits of normal. The lungs  are clear. No evidence of pneumothorax or pleural effusion. Mild  degenerative changes in the thoracic spine.    SWEETIE WILKERSON MD         7/15/2018 12:02 PM      Component Results     Component Value Ref Range & Units Status    Color Urine Straw  Final    Appearance Urine Clear  Final    Glucose Urine Negative NEG^Negative mg/dL Final    Bilirubin Urine Negative NEG^Negative Final    Ketones Urine Negative NEG^Negative mg/dL Final    Specific Gravity Urine 1.010 1.003 - 1.035 Final    Blood Urine Negative NEG^Negative Final    pH Urine 7.0 5.0 - 7.0 pH Final    Protein Albumin Urine Negative NEG^Negative mg/dL Final    Urobilinogen mg/dL 0.0 0.0 - 2.0 mg/dL Final    Nitrite Urine Negative NEG^Negative Final    Leukocyte Esterase Urine Negative NEG^Negative Final    Source Midstream Urine  Final    WBC Urine 1 0 - 5 /HPF Final    RBC Urine 0 0 - 2 /HPF Final    Bacteria Urine Few (A) NEG^Negative /HPF Final    Squamous Epithelial /HPF Urine 2 (H) 0 - 1 /HPF Final                Clinical Quality Measure: Blood Pressure Screening     Your blood pressure was checked while you were in the emergency department today. The last reading we obtained was  BP: (!) 182/91 . Please read the guidelines below about what these numbers mean and what you should do about them.  If your systolic blood pressure (the top number) is less than 120 and your diastolic blood pressure  (the bottom number) is less than 80, then your blood pressure is normal. There is nothing more that you need to do about it.  If your systolic blood pressure (the top number) is 120-139 or your diastolic blood pressure (the bottom number) is 80-89, your blood pressure may be higher than it should be. You should have your blood pressure rechecked within a year by a primary care provider.  If your systolic blood pressure (the top number) is 140 or greater or your diastolic blood pressure (the bottom number) is 90 or greater, you may have high blood pressure. High blood pressure is treatable, but if left untreated over time it can put you at risk for heart attack, stroke, or kidney failure. You should have your blood pressure rechecked by a primary care provider within the next 4 weeks.  If your provider in the emergency department today gave you specific instructions to follow-up with your doctor or provider even sooner than that, you should follow that instruction and not wait for up to 4 weeks for your follow-up visit.        Thank you for choosing [facility]       Thank you for choosing [facility] for your care. Our goal is always to provide you with excellent care. Hearing back from our patients is one way we can continue to improve our services. Please take a few minutes to complete the written survey that you may receive in the mail after you visit with us. Thank you!        [employer] Information     [employer] gives you secure access to your electronic health record. If you see a primary care provider, you can also send messages to your care team and make appointments. If you have questions, please call your primary care clinic.  If you do not have a primary care provider, please call [phone] and they will assist you.        Care EveryWhere ID     This is your Care EveryWhere ID. This could be used by other organizations to access your [facility] medical records  [id number]        Equal Access to Services     [employer]  CRISTOFER : Ciroii larisa Tobar, wawilliamda luqadaha, qaybta kajohnny tee, maria isabel goode. So [facility] [phone].    ATENCIÓN: Si habla español, tiene a shah disposición servicios gratuitos de asistencia lingüística. Llame al [phone].    We comply with applicable federal civil rights laws and [place] laws. We do not discriminate on the basis of race, color, national origin, age, disability, sex, sexual orientation, or gender identity.            After Visit Summary       This is your record. Keep this with you and show to your community pharmacist(s) and doctor(s) at your next visit.

## 2018-07-15 NOTE — ED AVS SNAPSHOT
[facility] [place] Emergency Department    [address]    [facility] [address]    Phone:  [phone]    Fax:  [phone]                                       Makenzie Blanco   MRN: [id number]    Department:  [facility] [place] Emergency Department   Date of Visit:  7/15/2018           After Visit Summary Signature Page     I have received my discharge instructions, and my questions have been answered. I have discussed any challenges I see with this plan with the nurse or doctor.    ..........................................................................................................................................  Patient/Patient Representative Signature      ..........................................................................................................................................  Patient Representative Print Name and Relationship to Patient    ..................................................               ................................................  Date                                            Time    ..........................................................................................................................................  Reviewed by Signature/Title    ...................................................              ..............................................  Date                                                            Time

## 2018-07-25 DIAGNOSIS — I10 BENIGN ESSENTIAL HYPERTENSION: ICD-10-CM

## 2018-07-25 DIAGNOSIS — I48.92 ATRIAL FLUTTER, UNSPECIFIED TYPE (H): ICD-10-CM

## 2018-07-25 DIAGNOSIS — R00.2 PALPITATIONS: ICD-10-CM

## 2018-09-04 ENCOUNTER — DOCUMENTATION ONLY (OUTPATIENT)
Dept: CARDIOLOGY | Facility: CLINIC | Age: 48
End: 2018-09-04

## 2018-09-05 ENCOUNTER — OFFICE VISIT (OUTPATIENT)
Dept: CARDIOLOGY | Facility: CLINIC | Age: 48
End: 2018-09-05
Payer: COMMERCIAL

## 2018-09-05 VITALS
DIASTOLIC BLOOD PRESSURE: 86 MMHG | HEIGHT: 64 IN | BODY MASS INDEX: 50.02 KG/M2 | WEIGHT: 293 LBS | HEART RATE: 76 BPM | SYSTOLIC BLOOD PRESSURE: 142 MMHG

## 2018-09-05 DIAGNOSIS — I48.3 TYPICAL ATRIAL FLUTTER (H): Primary | ICD-10-CM

## 2018-09-05 DIAGNOSIS — I10 BENIGN ESSENTIAL HYPERTENSION: ICD-10-CM

## 2018-09-05 DIAGNOSIS — R00.2 PALPITATIONS: ICD-10-CM

## 2018-09-05 PROCEDURE — 93000 ELECTROCARDIOGRAM COMPLETE: CPT | Performed by: INTERNAL MEDICINE

## 2018-09-05 PROCEDURE — 99214 OFFICE O/P EST MOD 30 MIN: CPT | Performed by: INTERNAL MEDICINE

## 2018-09-05 NOTE — MR AVS SNAPSHOT
"              After Visit Summary   9/5/2018    Makenzie Blanco    MRN: [id number]           Patient Information     Date Of Birth          1970        Visit Information        Provider Department      9/5/2018 4:15 PM Az Johnson MD [facility]        Today's Diagnoses     Typical atrial flutter (H)    -  1    Palpitations        Benign essential hypertension           Follow-ups after your visit        Who to contact     If you have questions or need follow up information about today's clinic visit or your schedule please contact [facility] directly at [phone].  Normal or non-critical lab and imaging results will be communicated to you by MyChart, letter or phone within 4 business days after the clinic has received the results. If you do not hear from us within 7 days, please contact the clinic through [employer] or phone. If you have a critical or abnormal lab result, we will notify you by phone as soon as possible.  Submit refill requests through [employer] or call your pharmacy and they will forward the refill request to us. Please allow 3 business days for your refill to be completed.          Additional Information About Your Visit        MyChart Information     [employer] gives you secure access to your electronic health record. If you see a primary care provider, you can also send messages to your care team and make appointments. If you have questions, please call your primary care clinic.  If you do not have a primary care provider, please call [phone] and they will assist you.        Care EveryWhere ID     This is your Care EveryWhere ID. This could be used by other organizations to access your [facility] medical records  [id number]        Your Vitals Were     Pulse Height BMI (Body Mass Index)             76 1.613 m (5' 3.5\") 68.18 kg/m2          Blood Pressure from Last 3 Encounters:   09/05/18 " 142/86   07/15/18 (!) 148/92   12/13/17 128/78    Weight from Last 3 Encounters:   09/05/18 (!) 177.4 kg (391 lb)   12/13/17 (!) 179 kg (394 lb 9.6 oz)   12/11/17 (!) 180 kg (396 lb 12.8 oz)              We Performed the Following     EKG 12-lead complete w/read - Clinics (performed today)        Primary Care Provider Office Phone # Fax #    Srikanth Morris [phone]       [facility] [address]  [facility] [address]        Equal Access to Services     [facility] CRISTOFER : Hadii aad susan hadasho Sojodie, waaxda luqadaha, qaybta kaalmada aderoberta, maria isabel goode. So [facility] [phone].    ATENCIÓN: Si habla español, tiene a shah disposición servicios gratuitos de asistencia lingüística. [facility] [phone].    We comply with applicable federal civil rights laws and [place] laws. We do not discriminate on the basis of race, color, national origin, age, disability, sex, sexual orientation, or gender identity.            Thank you!     Thank you for choosing [facility]  for your care. Our goal is always to provide you with excellent care. Hearing back from our patients is one way we can continue to improve our services. Please take a few minutes to complete the written survey that you may receive in the mail after your visit with us. Thank you!             Your Updated Medication List - Protect others around you: Learn how to safely use, store and throw away your medicines at www.disposemymeds.org.          This list is accurate as of 9/5/18 11:59 PM.  Always use your most recent med list.                   Brand Name Dispense Instructions for use Diagnosis    apixaban ANTICOAGULANT 5 MG tablet    ELIQUIS    60 tablet    Take 1 tablet (5 mg) by mouth 2 times daily    Atrial flutter, unspecified type (H), Palpitations, Benign essential hypertension       atenolol 50 MG tablet    TENORMIN    90 tablet    Take 1  tablet (50 mg) by mouth daily    Benign essential hypertension       SYNTHROID 200 MCG tablet   Generic drug:  levothyroxine      Take 200 mcg by mouth daily        triamterene-hydrochlorothiazide 37.5-25 MG per tablet    MAXZIDE-25     Take 1 tablet by mouth daily

## 2018-09-05 NOTE — LETTER
"9/5/2018    Srikanth Morris MD  [facility] [address]  [facility] [address]    RE: Makenzie Blanco       Dear Colleague,    I had the pleasure of seeing Makenzie Blanco in the [facility].    [id number]    Electrophysiology/ Clinic Note         H&P and Plan:     zA Johnson MD    Physical Exam:  Vitals: /86  Pulse 76  Ht 1.613 m (5' 3.5\")  Wt (!) 177.4 kg (391 lb)  BMI 68.18 kg/m2    Constitutional:  AAO x3.  Pt is in NAD.  HEAD: normocephalic.  SKIN: Skin normal color, texture and turgor with no lesions or eruptions.  Eyes: PERRL, EOMI.  ENT:  Supple, normal JVP. No lymphadenopathy or thyroid enlargement.  Chest:  CTAB.  Cardiac:  IIR, variable sound of S1 and Normal S2. No murmurs rubs or gallop.   Abdomen:  Normal BS.  Soft, non-tender and non-distended.  No rebound or guarding.    Extremities:  Pedious pulses palpable B/L.  No LE edema noticed.   Neurological: Strength and sensation grossly symmetric and intact throughout.       CURRENT MEDICATIONS:  Current Outpatient Prescriptions   Medication Sig Dispense Refill     apixaban ANTICOAGULANT (ELIQUIS) 5 MG tablet Take 1 tablet (5 mg) by mouth 2 times daily 60 tablet 1     atenolol (TENORMIN) 50 MG tablet Take 1 tablet (50 mg) by mouth daily 90 tablet 0     levothyroxine (SYNTHROID) 200 MCG tablet Take 200 mcg by mouth daily       triamterene-hydrochlorothiazide (MAXZIDE-25) 37.5-25 MG per tablet Take 1 tablet by mouth daily         ALLERGIES   No Known Allergies    PAST MEDICAL HISTORY:  Past Medical History:   Diagnosis Date     Abdominal pain 12/11/2017     Adjustment disorder with mixed anxiety and depressed mood 5/6/2014     Anxiety      Arrhythmia     afib     Atrial flutter (H)     On Holter 5/24/2016     Cardiomyopathy (H)      Chronic ear infection      Gastroesophageal reflux disease      HTN (hypertension)      Hypothyroid      LVH (left ventricular hypertrophy)      Morbid obesity " (H)      MILTON (obstructive sleep apnea)     CPAP     Palpitations      Typical atrial flutter (H) 5/24/2016       PAST SURGICAL HISTORY:  Past Surgical History:   Procedure Laterality Date     dental extractions      wisdom teeth     GYN SURGERY      D&C     LAPAROSCOPIC CHOLECYSTECTOMY N/A 12/13/2017    Procedure: LAPAROSCOPIC CHOLECYSTECTOMY;  LAPAROSCOPIC CHOLECYSTECTOMY ;  Surgeon: Kvng Davidson MD;  Location: [facility] OR       FAMILY HISTORY:  Family History   Problem Relation Age of Onset     Hypertension Mother        SOCIAL HISTORY:  Social History     Social History     Marital status:      Spouse name: N/A     Number of children: N/A     Years of education: N/A     Social History Main Topics     Smoking status: Never Smoker     Smokeless tobacco: Never Used     Alcohol use 0.0 oz/week     0 Standard drinks or equivalent per week      Comment: occasional      Drug use: No     Sexual activity: Not Asked     Other Topics Concern     Caffeine Concern Yes     1 cups of coffee a day      Special Diet No     Exercise No     Social History Narrative       Review of Systems:  Skin:  Negative     Eyes:  Positive for glasses  ENT:  Negative    Respiratory:  Positive for dyspnea on exertion;sleep apnea;CPAP  Cardiovascular:  Negative    Gastroenterology: Positive for heartburn  Genitourinary:  Negative    Musculoskeletal:  Positive for joint pain  Neurologic:  Negative    Psychiatric:  Positive for anxiety  Heme/Lymph/Imm:  Negative    Endocrine:  Positive for thyroid disorder      Recent Lab Results:  LIPID RESULTS:  Lab Results   Component Value Date    CHOL 160 03/30/2015    HDL 52 03/30/2015    LDL 88 03/30/2015    TRIG 100 03/30/2015       LIVER ENZYME RESULTS:  Lab Results   Component Value Date    AST 20 01/11/2018    ALT 25 01/11/2018       CBC RESULTS:  Lab Results   Component Value Date    WBC 12.2 (H) 12/11/2017    RBC 4.90 12/11/2017    HGB 12.7 01/11/2018    HCT 39.7 12/11/2017    MCV 81  2017    MCH 26.5 2017    MCHC 32.7 2017    RDW 14.3 2017     2017       BMP RESULTS:  Lab Results   Component Value Date     2018    POTASSIUM 4.8 2018    CHLORIDE 101 2018    CO2 26 2018    ANIONGAP 17.8 2018    GLC 87 2018    BUN 12 2018    CR 0.80 2018    GFRESTIMATED 90 2017    GFRESTBLACK >90 2017    GERMAN 9.2 2018        A1C RESULTS:  No results found for: A1C    INR RESULTS:  Lab Results   Component Value Date    INR 1.29 (H) 2017         ECHOCARDIOGRAM  Recent Results (from the past 8760 hour(s))   ECHO COMPLETE WITH OPTISON    Narrative    [id number]  [id number]  [id number]  [id number]^TRELL^CESAR^ZOEY           [facility]  Echocardiography Laboratory  [address] [place] [address]        Name: PHILIPP ALLEN  MRN: [id number]  : 1970  Study Date: 2018 09:51 AM  Age: 48 yrs  Gender: Female  Patient Location: [facility]  Reason For Study: Atrial flutter, unspecified type (H)  Ordering Physician: CESAR CAI  Referring Physician: Srikanth Morris  Performed By: Paul Gar RDCS     BSA: 2.6 m2  Height: 65 in  Weight: 394 lb  HR: 75  BP: 163/80 mmHg  _____________________________________________________________________________  __        Procedure  Complete Echo Adult. Contrast Optison.  _____________________________________________________________________________  __        Interpretation Summary     The visual ejection fraction is estimated at 60-65%.  There is mild concentric left ventricular hypertrophy.  The right ventricle is normal in size and function.  There is mild biatrial enlargement.  Technically difficult, suboptimal study. Compared to prior study, there is no  significant change.  _____________________________________________________________________________  __        Left Ventricle  The left ventricle is normal in size. There is mild concentric  left  ventricular hypertrophy. The visual ejection fraction is estimated at 60-65%.  Regional wall motion abnormalities cannot be excluded due to limited  visualization.     Right Ventricle  The right ventricle is normal in size and function.     Atria  There is mild biatrial enlargement. There is no atrial shunt seen.     Mitral Valve  The mitral valve is normal in structure and function. There is trace mitral  regurgitation.        Tricuspid Valve  The tricuspid valve is not well visualized, but is grossly normal. There is  trace tricuspid regurgitation.     Aortic Valve  The aortic valve is not well visualized. No hemodynamically significant  valvular aortic stenosis.     Pulmonic Valve  The pulmonic valve is not well seen, but is grossly normal.     Vessels  The aortic root is normal size. The ascending aorta is Borderline dilated. The  inferior vena cava is not dilated.     Pericardium  There is no pericardial effusion.        Rhythm  Sinus rhythm was noted.  _____________________________________________________________________________  __  MMode/2D Measurements & Calculations  IVSd: 1.3 cm     LVIDd: 5.3 cm  LVIDs: 3.1 cm  LVPWd: 1.2 cm  FS: 41.2 %  LV mass(C)d: 276.8 grams  LV mass(C)dI: 104.9 grams/m2  Ao root diam: 3.2 cm  LA dimension: 3.9 cm  asc Aorta Diam: 3.6 cm  LA/Ao: 1.2  LVOT diam: 2.1 cm  LVOT area: 3.4 cm2  LA Volume (BP): 94.0 ml  LA Volume Index (BP): 35.6 ml/m2  RWT: 0.46           Doppler Measurements & Calculations  MV E max genny: 100.7 cm/sec  MV A max genny: 64.7 cm/sec  MV E/A: 1.6  MV dec time: 0.26 sec  LV V1 max P.8 mmHg  LV V1 max: 120.4 cm/sec  LV V1 VTI: 28.8 cm  CO(LVOT): 6.0 l/min  CI(LVOT): 2.3 l/min/m2  SV(LVOT): 98.2 ml  SI(LVOT): 37.2 ml/m2  E/E' av.2  Lateral E/e': 7.7  Medial E/e': 8.8           _____________________________________________________________________________  __           Report approved by: Jenaro Kelley 2018 10:57 AM            Orders Placed This  Encounter   Procedures     EKG 12-lead complete w/read - Clinics (performed today)     No orders of the defined types were placed in this encounter.    Medications Discontinued During This Encounter   Medication Reason     cyclobenzaprine (FLEXERIL) 10 MG tablet Therapy completed     HYDROcodone-acetaminophen (NORCO) 5-325 MG per tablet Therapy completed     HYDROmorphone (DILAUDID) 4 MG tablet Therapy completed         Encounter Diagnoses   Name Primary?     Typical atrial flutter (H) Yes     Palpitations      HTN (hypertension)      Benign essential hypertension          CC  No referring provider defined for this encounter.                Thank you for allowing me to participate in the care of your patient.      Sincerely,     Az Johnson MD     [facility]    cc:   No referring provider defined for this encounter.

## 2018-09-05 NOTE — LETTER
9/5/2018      Srikanth Morris MD  [facility] [address]  [facility] [address]      RE: Makenzie RAFIQ Bella       Dear Colleague,    I had the pleasure of seeing Makenzie Allen in the [facility].    Service Date: 09/05/2018      REASON FOR VISIT:  Evaluation of paroxysmal AFib/flutter.      HISTORY OF PRESENT ILLNESS:  Ms. Allen is a pleasant 48-year-old lady with a history of hypertension, morbid obesity, obstructive sleep apnea and paroxysmal AFib/flutter who is here for evaluation.      I last saw her 02/2017.  At that time she was doing well on beta blocker therapy.  Today she informs that she continues to do well.  She did have a couple of episodes of palpitation which lasted for several hours.  She actually has an episode that started this morning.  However, she affirms that the episodes do not seem to affect her lifestyle.      EKG done here today confirmed atrial flutter.      She denies any other symptoms such as chest pain, shortness of breath, lightheadedness, near-syncope or syncopal episode.      Echocardiogram obtained 03/06/2018 revealed mild LVH and a normal EF, normal cardiac function with EF estimated at 60%-65%.      ASSESSMENT AND PLAN:   1.  Paroxysmal AFib/flutter.  She is currently in an episode of flutter; however, she seems to be minimally symptomatic with it.  I recommend increasing the dose of atenolol from 50 to 75 mg daily while she is in the episode.  Once she breaks out of the episodes, she can go back to her regular dose.  She will let us know if the episode persists for longer than 24 hours.   2.  Hypertension.  Blood pressure is well controlled.   3.  Embolic prevention.  CHADS-VASc score of 2.  Continue anticoagulation with Eliquis.   4.  Followup care.  Follow up in clinic in a year or earlier as needed.         CESAR CAI MD             D: 09/06/2018   T: 09/06/2018   MT: MARIA G      Name:     MAKENZIE ALLEN    MRN:      [id number]        Account:      [id number]   :      1970           Service Date: 2018      Document: [id number]         Outpatient Encounter Prescriptions as of 2018   Medication Sig Dispense Refill     apixaban ANTICOAGULANT (ELIQUIS) 5 MG tablet Take 1 tablet (5 mg) by mouth 2 times daily 60 tablet 1     atenolol (TENORMIN) 50 MG tablet Take 1 tablet (50 mg) by mouth daily 90 tablet 0     levothyroxine (SYNTHROID) 200 MCG tablet Take 200 mcg by mouth daily       triamterene-hydrochlorothiazide (MAXZIDE-25) 37.5-25 MG per tablet Take 1 tablet by mouth daily       [DISCONTINUED] cyclobenzaprine (FLEXERIL) 10 MG tablet Take 1 tablet (10 mg) by mouth 3 times daily as needed for muscle spasms (Patient not taking: Reported on 2018) 15 tablet 0     [DISCONTINUED] HYDROcodone-acetaminophen (NORCO) 5-325 MG per tablet Take 1-2 tablets by mouth every 4 hours as needed for other (Moderate to Severe Pain) (Patient not taking: Reported on 2018) 30 tablet 0     [DISCONTINUED] HYDROmorphone (DILAUDID) 4 MG tablet Take 1 tablet (4 mg) by mouth every 4 hours as needed for moderate to severe pain maximum 6 tablet(s) per day 12 tablet 0     No facility-administered encounter medications on file as of 2018.        Again, thank you for allowing me to participate in the care of your patient.      Sincerely,    Az Johnson MD     [facility]

## 2018-09-06 NOTE — PROGRESS NOTES
"[id number]    Electrophysiology/ Clinic Note         H&P and Plan:     Az Johnson MD    Physical Exam:  Vitals: /86  Pulse 76  Ht 1.613 m (5' 3.5\")  Wt (!) 177.4 kg (391 lb)  BMI 68.18 kg/m2    Constitutional:  AAO x3.  Pt is in NAD.  HEAD: normocephalic.  SKIN: Skin normal color, texture and turgor with no lesions or eruptions.  Eyes: PERRL, EOMI.  ENT:  Supple, normal JVP. No lymphadenopathy or thyroid enlargement.  Chest:  CTAB.  Cardiac:  IIR, variable sound of S1 and Normal S2. No murmurs rubs or gallop.   Abdomen:  Normal BS.  Soft, non-tender and non-distended.  No rebound or guarding.    Extremities:  Pedious pulses palpable B/L.  No LE edema noticed.   Neurological: Strength and sensation grossly symmetric and intact throughout.       CURRENT MEDICATIONS:  Current Outpatient Prescriptions   Medication Sig Dispense Refill     apixaban ANTICOAGULANT (ELIQUIS) 5 MG tablet Take 1 tablet (5 mg) by mouth 2 times daily 60 tablet 1     atenolol (TENORMIN) 50 MG tablet Take 1 tablet (50 mg) by mouth daily 90 tablet 0     levothyroxine (SYNTHROID) 200 MCG tablet Take 200 mcg by mouth daily       triamterene-hydrochlorothiazide (MAXZIDE-25) 37.5-25 MG per tablet Take 1 tablet by mouth daily         ALLERGIES   No Known Allergies    PAST MEDICAL HISTORY:  Past Medical History:   Diagnosis Date     Abdominal pain 12/11/2017     Adjustment disorder with mixed anxiety and depressed mood 5/6/2014     Anxiety      Arrhythmia     afib     Atrial flutter (H)     On Holter 5/24/2016     Cardiomyopathy (H)      Chronic ear infection      Gastroesophageal reflux disease      HTN (hypertension)      Hypothyroid      LVH (left ventricular hypertrophy)      Morbid obesity (H)      MILTON (obstructive sleep apnea)     CPAP     Palpitations      Typical atrial flutter (H) 5/24/2016       PAST SURGICAL HISTORY:  Past Surgical History:   Procedure Laterality Date     dental extractions      wisdom teeth     GYN SURGERY      D&C "     LAPAROSCOPIC CHOLECYSTECTOMY N/A 12/13/2017    Procedure: LAPAROSCOPIC CHOLECYSTECTOMY;  LAPAROSCOPIC CHOLECYSTECTOMY ;  Surgeon: Kvng Davidson MD;  Location: [facility] OR       FAMILY HISTORY:  Family History   Problem Relation Age of Onset     Hypertension Mother        SOCIAL HISTORY:  Social History     Social History     Marital status:      Spouse name: N/A     Number of children: N/A     Years of education: N/A     Social History Main Topics     Smoking status: Never Smoker     Smokeless tobacco: Never Used     Alcohol use 0.0 oz/week     0 Standard drinks or equivalent per week      Comment: occasional      Drug use: No     Sexual activity: Not Asked     Other Topics Concern     Caffeine Concern Yes     1 cups of coffee a day      Special Diet No     Exercise No     Social History Narrative       Review of Systems:  Skin:  Negative     Eyes:  Positive for glasses  ENT:  Negative    Respiratory:  Positive for dyspnea on exertion;sleep apnea;CPAP  Cardiovascular:  Negative    Gastroenterology: Positive for heartburn  Genitourinary:  Negative    Musculoskeletal:  Positive for joint pain  Neurologic:  Negative    Psychiatric:  Positive for anxiety  Heme/Lymph/Imm:  Negative    Endocrine:  Positive for thyroid disorder      Recent Lab Results:  LIPID RESULTS:  Lab Results   Component Value Date    CHOL 160 03/30/2015    HDL 52 03/30/2015    LDL 88 03/30/2015    TRIG 100 03/30/2015       LIVER ENZYME RESULTS:  Lab Results   Component Value Date    AST 20 01/11/2018    ALT 25 01/11/2018       CBC RESULTS:  Lab Results   Component Value Date    WBC 12.2 (H) 12/11/2017    RBC 4.90 12/11/2017    HGB 12.7 01/11/2018    HCT 39.7 12/11/2017    MCV 81 12/11/2017    MCH 26.5 12/11/2017    MCHC 32.7 12/11/2017    RDW 14.3 12/11/2017     12/11/2017       BMP RESULTS:  Lab Results   Component Value Date     05/07/2018    POTASSIUM 4.8 05/07/2018    CHLORIDE 101 05/07/2018    CO2 26 05/07/2018     ANIONGAP 17.8 2018    GLC 87 2018    BUN 12 2018    CR 0.80 2018    GFRESTIMATED 90 2017    GFRESTBLACK >90 2017    GERMAN 9.2 2018        A1C RESULTS:  No results found for: A1C    INR RESULTS:  Lab Results   Component Value Date    INR 1.29 (H) 2017         ECHOCARDIOGRAM  Recent Results (from the past 8760 hour(s))   ECHO COMPLETE WITH OPTISON    Narrative    [id number]  [id number]  [id number]^TRELL^CESAR^ZOEY           [facility]  Echocardiography Laboratory  [address] [place] [address]        Name: PHILIPP ALLEN  MRN: [id number]  : 1970  Study Date: 2018 09:51 AM  Age: 48 yrs  Gender: Female  Patient Location: [facility]  Reason For Study: Atrial flutter, unspecified type (H)  Ordering Physician: CESAR CAI  Referring Physician: Srikanth Morris  Performed By: Paul Gar RDCS     BSA: 2.6 m2  Height: 65 in  Weight: 394 lb  HR: 75  BP: 163/80 mmHg  _____________________________________________________________________________  __        Procedure  Complete Echo Adult. Contrast Optison.  _____________________________________________________________________________  __        Interpretation Summary     The visual ejection fraction is estimated at 60-65%.  There is mild concentric left ventricular hypertrophy.  The right ventricle is normal in size and function.  There is mild biatrial enlargement.  Technically difficult, suboptimal study. Compared to prior study, there is no  significant change.  _____________________________________________________________________________  __        Left Ventricle  The left ventricle is normal in size. There is mild concentric left  ventricular hypertrophy. The visual ejection fraction is estimated at 60-65%.  Regional wall motion abnormalities cannot be excluded due to limited  visualization.     Right Ventricle  The right ventricle is normal in size and function.     Atria  There  is mild biatrial enlargement. There is no atrial shunt seen.     Mitral Valve  The mitral valve is normal in structure and function. There is trace mitral  regurgitation.        Tricuspid Valve  The tricuspid valve is not well visualized, but is grossly normal. There is  trace tricuspid regurgitation.     Aortic Valve  The aortic valve is not well visualized. No hemodynamically significant  valvular aortic stenosis.     Pulmonic Valve  The pulmonic valve is not well seen, but is grossly normal.     Vessels  The aortic root is normal size. The ascending aorta is Borderline dilated. The  inferior vena cava is not dilated.     Pericardium  There is no pericardial effusion.        Rhythm  Sinus rhythm was noted.  _____________________________________________________________________________  __  MMode/2D Measurements & Calculations  IVSd: 1.3 cm     LVIDd: 5.3 cm  LVIDs: 3.1 cm  LVPWd: 1.2 cm  FS: 41.2 %  LV mass(C)d: 276.8 grams  LV mass(C)dI: 104.9 grams/m2  Ao root diam: 3.2 cm  LA dimension: 3.9 cm  asc Aorta Diam: 3.6 cm  LA/Ao: 1.2  LVOT diam: 2.1 cm  LVOT area: 3.4 cm2  LA Volume (BP): 94.0 ml  LA Volume Index (BP): 35.6 ml/m2  RWT: 0.46           Doppler Measurements & Calculations  MV E max genny: 100.7 cm/sec  MV A max genny: 64.7 cm/sec  MV E/A: 1.6  MV dec time: 0.26 sec  LV V1 max P.8 mmHg  LV V1 max: 120.4 cm/sec  LV V1 VTI: 28.8 cm  CO(LVOT): 6.0 l/min  CI(LVOT): 2.3 l/min/m2  SV(LVOT): 98.2 ml  SI(LVOT): 37.2 ml/m2  E/E' av.2  Lateral E/e': 7.7  Medial E/e': 8.8           _____________________________________________________________________________  __           Report approved by: Jenaro Kelley 2018 10:57 AM            Orders Placed This Encounter   Procedures     EKG 12-lead complete w/read - Clinics (performed today)     No orders of the defined types were placed in this encounter.    Medications Discontinued During This Encounter   Medication Reason     cyclobenzaprine (FLEXERIL) 10 MG  tablet Therapy completed     HYDROcodone-acetaminophen (NORCO) 5-325 MG per tablet Therapy completed     HYDROmorphone (DILAUDID) 4 MG tablet Therapy completed         Encounter Diagnoses   Name Primary?     Typical atrial flutter (H) Yes     Palpitations      HTN (hypertension)      Benign essential hypertension          CC  No referring provider defined for this encounter.

## 2018-09-07 NOTE — PROGRESS NOTES
Service Date: 2018      REASON FOR VISIT:  Evaluation of paroxysmal AFib/flutter.      HISTORY OF PRESENT ILLNESS:  Ms. Allen is a pleasant 48-year-old lady with a history of hypertension, morbid obesity, obstructive sleep apnea and paroxysmal AFib/flutter who is here for evaluation.      I last saw her 2017.  At that time she was doing well on beta blocker therapy.  Today she informs that she continues to do well.  She did have a couple of episodes of palpitation which lasted for several hours.  She actually had an episode that started this morning.  However, she affirms that the episodes do not seem to affect her lifestyle.      EKG done here today confirmed atrial flutter.      She denies any other symptoms such as chest pain, shortness of breath, lightheadedness, near-syncope or syncopal episode.      Echocardiogram obtained 2018 revealed mild LVH and a normal EF, normal cardiac function with EF estimated at 60%-65%.      ASSESSMENT AND PLAN:   1.  Paroxysmal AFib/flutter.  She is currently in an episode of flutter; however, she seems to be minimally symptomatic with it.  I recommend increasing the dose of atenolol from 50 to 75 mg daily while she is in the episode.  Once she breaks out of the episodes, she can go back to her regular dose.  She will let us know if the episode persists for longer than 24 hours.   2.  Hypertension.  Blood pressure is well controlled.   3.  Embolic prevention.  CHADS-VASc score of 2.  Continue anticoagulation with Eliquis.   4.  Followup care.  Follow up in clinic in a year or earlier as needed.         CESAR CAI MD             D: 2018   T: 2018   MT: MARIA G      Name:     PHILIPP ALLEN   MRN:      [id number]        Account:      [id number]   :      1970           Service Date: 2018      Document: [id number]

## 2018-09-24 DIAGNOSIS — R00.2 PALPITATIONS: ICD-10-CM

## 2018-09-24 DIAGNOSIS — I48.92 ATRIAL FLUTTER, UNSPECIFIED TYPE (H): ICD-10-CM

## 2018-09-24 DIAGNOSIS — I10 BENIGN ESSENTIAL HYPERTENSION: ICD-10-CM

## 2018-10-02 DIAGNOSIS — I10 BENIGN ESSENTIAL HYPERTENSION: ICD-10-CM

## 2018-10-02 RX ORDER — ATENOLOL 50 MG/1
50 TABLET ORAL DAILY
Qty: 90 TABLET | Refills: 3 | Status: SHIPPED | OUTPATIENT
Start: 2018-10-02 | End: 2021-02-15

## 2019-03-28 ENCOUNTER — TELEPHONE (OUTPATIENT)
Dept: CARDIOLOGY | Facility: CLINIC | Age: 49
End: 2019-03-28

## 2019-03-28 DIAGNOSIS — I48.3 TYPICAL ATRIAL FLUTTER (H): Primary | ICD-10-CM

## 2019-03-28 NOTE — TELEPHONE ENCOUNTER
Patient requesting switch from eliquis to xarelto d/t change in insurance and cost will reach out to Dr Johnson for orders.  RONNELL Avalos

## 2019-03-29 NOTE — TELEPHONE ENCOUNTER
Verbal order per Dr Johnson:  1. Stop eliquis  2. Start Xarelto 20mg po every day with evening meal    Spoke to patient regarding above.  Patient informed this writer that she has changed insurance and [facility]'s pharmacy is the preferred pharmacy.  This was updated in medical record.  Script sent to preferred pharmacy and sent to patient $10 co-pay card in mail as requested.  RONNELL Avalos

## 2019-05-03 ENCOUNTER — MYC MEDICAL ADVICE (OUTPATIENT)
Dept: CARDIOLOGY | Facility: CLINIC | Age: 49
End: 2019-05-03

## 2019-09-30 ENCOUNTER — HEALTH MAINTENANCE LETTER (OUTPATIENT)
Age: 49
End: 2019-09-30

## 2020-02-06 ENCOUNTER — TELEPHONE (OUTPATIENT)
Dept: CARDIOLOGY | Facility: CLINIC | Age: 50
End: 2020-02-06

## 2020-02-06 DIAGNOSIS — I48.3 TYPICAL ATRIAL FLUTTER (H): Primary | ICD-10-CM

## 2020-02-06 NOTE — TELEPHONE ENCOUNTER
"Pt left voicemail. She needs to know how many days she can hold Xarelto before a dental procedure.     Chart reviewed. Pt last saw Dr. Johnson on 9/5/2018: \"Embolic prevention.  CHADS-VASc score of 2.  Continue anticoagulation with Eliquis. Followup care.  Follow up in clinic in a year or earlier as needed.\"    Per our protocol, for dental procedures with a ChadsVasc </=5, may hold NOAC for 3 days with no bridging required and restart day after procedure. Called pt and confirmed she has no history of stroke or artificial heart valves. Gave her hold instructions above, pt states understanding.     Entered order for f/u OV with Dr. Johnson because she is overdue, transferred pt to scheduling to set this up.   "

## 2020-03-15 ENCOUNTER — MYC MEDICAL ADVICE (OUTPATIENT)
Dept: CARDIOLOGY | Facility: CLINIC | Age: 50
End: 2020-03-15

## 2020-03-15 DIAGNOSIS — I48.3 TYPICAL ATRIAL FLUTTER (H): ICD-10-CM

## 2020-03-16 ENCOUNTER — MYC MEDICAL ADVICE (OUTPATIENT)
Dept: CARDIOLOGY | Facility: CLINIC | Age: 50
End: 2020-03-16

## 2020-03-16 DIAGNOSIS — I48.3 TYPICAL ATRIAL FLUTTER (H): ICD-10-CM

## 2020-04-23 ENCOUNTER — MYC MEDICAL ADVICE (OUTPATIENT)
Dept: CARDIOLOGY | Facility: CLINIC | Age: 50
End: 2020-04-23

## 2020-04-23 NOTE — TELEPHONE ENCOUNTER
"Received below message from patient in [facility]:         Patient is scheduled to follow-up with Dr. Johnson on 5/20/20.     Per Dr. Johnson's last office visit note in 9/2018, last echo on 3/6/18 showed: \"mild LVH and a normal EF, normal cardiac function with EF estimated at 60%-65%\".    Will route to Dr. Johnson for review and recommendations.     RONNELL Bolaños             "

## 2020-04-27 NOTE — TELEPHONE ENCOUNTER
Called and spoke with patient re: Dr. Johnson's recommendations.  Pt was scheduled for an office visit with Dr. Johnson at [place] on 5/20/20.  This was changed to a [employer] Video Visit this Wednesday, 4/29.  Pt has a smartphone and will have BP, height, and weight available for visit.  Pt will call the clinic if she has any questions/concerns, clinic phone number provided.     RONNELL Bolaños

## 2020-04-29 ENCOUNTER — VIRTUAL VISIT (OUTPATIENT)
Dept: CARDIOLOGY | Facility: CLINIC | Age: 50
End: 2020-04-29
Attending: INTERNAL MEDICINE
Payer: COMMERCIAL

## 2020-04-29 DIAGNOSIS — I48.0 PAROXYSMAL ATRIAL FIBRILLATION (H): ICD-10-CM

## 2020-04-29 DIAGNOSIS — I10 BENIGN ESSENTIAL HYPERTENSION: ICD-10-CM

## 2020-04-29 DIAGNOSIS — I48.3 TYPICAL ATRIAL FLUTTER (H): Primary | ICD-10-CM

## 2020-04-29 DIAGNOSIS — R00.2 PALPITATIONS: ICD-10-CM

## 2020-04-29 PROCEDURE — 99213 OFFICE O/P EST LOW 20 MIN: CPT | Mod: GT | Performed by: INTERNAL MEDICINE

## 2020-04-29 NOTE — LETTER
4/29/2020      RE: Makenzie Blanco  [address]  [place] [address]       Dear Colleague,    Thank you for the opportunity to participate in the care of your patient, Makenzie Blanco, at the [facility]. Please see a copy of my visit note below.    REASON FOR VISIT:  Evaluation of paroxysmal AFib/flutter.      HISTORY OF PRESENT ILLNESS:  Ms. Blanco is a pleasant 50-year-old lady with a history of hypertension, morbid obesity, obstructive sleep apnea and paroxysmal AFib/flutter who is here for evaluation.     She is on chronic therapy with atenolol for rhythm control strategy and Xarelto for embolic prevention.  Today, she informs she has done well from A. fib standpoint.  She denies any major recurrence of atrial fibrillation in the last year.  Her major complaint today is related with lower extremity edema.  She informs that in February her  had the aortic dissection and since then he has been unable to help her with work.  For this reason, she has been working more intensely and standing for long peers of time, which resulted increasing lower extremity edema.  She denies any other symptoms such as chest pain, shortness of breath, lightheadedness, near-syncope or syncope.    Previous studies:  - Echocardiogram (03/06/2018): Mild LVH and a normal EF. Normal cardiac function with EF estimated at 60%-65%.      ASSESSMENT AND PLAN:   1.    Lower extremity edema.  Likely related to lymphedema/overweight and the fact that she has been standing up for long periods of time.  She is currently taking Maxzide (25/37.5 mg).  I recommend taking an extra half of those in the afternoon.  In the days that she noticed increasing lower extremity edema.    Based on her symptoms she is not having A. fib/flutter.  However, I recommend her to contact us in case her extreme edema gets worse.  In that setting, I would recommend  repeating a limited echo and obtain a monitor.    She will follow-up with us in a month to reevaluate symptomatology.    2. Paroxysmal AFib/flutter.  Continue therapy with atenolol.  3. Hypertension.  Blood pressure seems to be well controlled.  Continue therapy with atenolol and Maxide.    4.  Embolic prevention.  CHADS-VASc score of 2.  Continue anticoagulation with Eliquis.   5.  Followup care.  Follow up in clinic in 1 month.     Please do not hesitate to contact me if you have any questions/concerns.     Sincerely,     Az Johnson MD

## 2020-04-29 NOTE — PROGRESS NOTES
Electrophysiology/ Virtual Clinic Note         H&P and Plan:   Patient opted to have a virtual visit due to ongoing issues related to ongoing COVID-19 virus pandemic and to minimize social interaction (based on CDC guidelines).      Visit details:  Patient has given verbal consent for this visit.    Interview was done talking and seeing patient via Internet.    Mode of transmission: IPLogic.  Visit start time: 9:40 AM  Visit stop time: 9:59 AM  Provider location: Office.  Patient location: Home.       REASON FOR VISIT:  Evaluation of paroxysmal AFib/flutter.      HISTORY OF PRESENT ILLNESS:  Ms. Blanco is a pleasant 50-year-old lady with a history of hypertension, morbid obesity, obstructive sleep apnea and paroxysmal AFib/flutter who is here for evaluation.     She is on chronic therapy with atenolol for rhythm control strategy and Xarelto for embolic prevention.  Today, she informs she has done well from A. fib standpoint.  She denies any major recurrence of atrial fibrillation in the last year.  Her major complaint today is related with lower extremity edema.  She informs that in February her  had the aortic dissection and since then he has been unable to help her with work.  For this reason, she has been working more intensely and standing for long peers of time, which resulted increasing lower extremity edema.  She denies any other symptoms such as chest pain, shortness of breath, lightheadedness, near-syncope or syncope.    Previous studies:  - Echocardiogram (03/06/2018): Mild LVH and a normal EF. Normal cardiac function with EF estimated at 60%-65%.      ASSESSMENT AND PLAN:   1.    Lower extremity edema.  Likely related to lymphedema/overweight and the fact that she has been standing up for long periods of time.  She is currently taking Maxzide (25/37.5 mg).  I recommend taking an extra half of those in the afternoon.  In the days that she noticed increasing lower extremity edema.    Based on her  symptoms she is not having A. fib/flutter.  However, I recommend her to contact us in case her extreme edema gets worse.  In that setting, I would recommend repeating a limited echo and obtain a monitor.    She will follow-up with us in a month to reevaluate symptomatology.    2. Paroxysmal AFib/flutter.  Continue therapy with atenolol.  3. Hypertension.  Blood pressure seems to be well controlled.  Continue therapy with atenolol and Maxide.    4.  Embolic prevention.  CHADS-VASc score of 2.  Continue anticoagulation with Eliquis.   5.  Followup care.  Follow up in clinic in 1 month.          Az Johnson MD    Physical Exam:  Vitals: There were no vitals taken for this visit.     BP: 118/85 mmHg; P: 60-90 bpm     Constitutional:  Pt is in no acute distress.  Normal body habitus, upright.  HEAD: Normocephalic. No evidence of injury or laceration.   SKIN: Skin normal color with no lesions or eruptions. No xanthelasma.  ENT/Mouth:  Membranes moist. No nasal discharge or bleeding gums.   Neck:  Supple, normal JVP, no evidence of thyromegaly.  Chest/Lungs: No audible wheezing or labored breathing. Normal chest wall expansion. No cough.   Cardiovascular: Normal jugular venous pressure. No evidence of pitting edema bilaterally.   Abdomen: No evidence of abdominal distention. No observed jaundice.  Eyes: PERRL, EOMI. No scleral icterus, normal conjunctivae.  Extremities:  No lower extremity edema noticed.  No cyanosis or clubbing noted.   Neurologic: Normal arm motion bilateral.  No tremors. No evidence of focal defect.   Psychiatric: Alert and oriented x3, calm.         CURRENT MEDICATIONS:  Current Outpatient Medications   Medication Sig Dispense Refill     atenolol (TENORMIN) 50 MG tablet Take 1 tablet (50 mg) by mouth daily 90 tablet 3     levothyroxine (SYNTHROID) 200 MCG tablet Take 200 mcg by mouth daily       rivaroxaban ANTICOAGULANT (XARELTO ANTICOAGULANT) 20 MG TABS tablet Take 1 tablet (20 mg) by mouth daily  (with dinner) 90 tablet 0     triamterene-hydrochlorothiazide (MAXZIDE-25) 37.5-25 MG per tablet Take 1 tablet by mouth daily         ALLERGIES   No Known Allergies    PAST MEDICAL HISTORY:  Past Medical History:   Diagnosis Date     Abdominal pain 12/11/2017     Adjustment disorder with mixed anxiety and depressed mood 5/6/2014     Anxiety      Arrhythmia     afib     Cardiomyopathy (H)      Chronic ear infection      Gastroesophageal reflux disease      HTN (hypertension)      Hypothyroid      LVH (left ventricular hypertrophy)      Morbid obesity (H)      MILTON (obstructive sleep apnea)     CPAP     Palpitations      Typical atrial flutter (H) 5/24/2016       PAST SURGICAL HISTORY:  Past Surgical History:   Procedure Laterality Date     dental extractions      wisdom teeth     GYN SURGERY      D&C     LAPAROSCOPIC CHOLECYSTECTOMY N/A 12/13/2017    Procedure: LAPAROSCOPIC CHOLECYSTECTOMY;  LAPAROSCOPIC CHOLECYSTECTOMY ;  Surgeon: Kvng Davidson MD;  Location:  OR       FAMILY HISTORY:  Family History   Problem Relation Age of Onset     Hypertension Mother        SOCIAL HISTORY:  Social History     Socioeconomic History     Marital status:      Spouse name: None     Number of children: None     Years of education: None     Highest education level: None   Occupational History     None   Social Needs     Financial resource strain: None     Food insecurity     Worry: None     Inability: None     Transportation needs     Medical: None     Non-medical: None   Tobacco Use     Smoking status: Never Smoker     Smokeless tobacco: Never Used   Substance and Sexual Activity     Alcohol use: Yes     Alcohol/week: 0.0 standard drinks     Comment: occasional      Drug use: No     Sexual activity: None   Lifestyle     Physical activity     Days per week: None     Minutes per session: None     Stress: None   Relationships     Social connections     Talks on phone: None     Gets together: None     Attends [employer]  service: None     Active member of club or organization: None     Attends meetings of clubs or organizations: None     Relationship status: None     Intimate partner violence     Fear of current or ex partner: None     Emotionally abused: None     Physically abused: None     Forced sexual activity: None   Other Topics Concern      Service Not Asked     Blood Transfusions Not Asked     Caffeine Concern Yes     Comment: 1 cups of coffee a day      Occupational Exposure Not Asked     Hobby Hazards Not Asked     Sleep Concern Not Asked     Stress Concern Not Asked     Weight Concern Not Asked     Special Diet No     Back Care Not Asked     Exercise No     Bike Helmet Not Asked     Seat Belt Not Asked     Self-Exams Not Asked     Parent/sibling w/ CABG, MI or angioplasty before 65F 55M? Not Asked   Social History Narrative     None       Review of Systems:  Skin:        Eyes:       ENT:       Respiratory:       Cardiovascular:       Gastroenterology:      Genitourinary:       Musculoskeletal:       Neurologic:       Psychiatric:       Heme/Lymph/Imm:       Endocrine:           Recent Lab Results:  LIPID RESULTS:  Lab Results   Component Value Date    CHOL 160 03/30/2015    HDL 52 03/30/2015    LDL 88 03/30/2015    TRIG 100 03/30/2015       LIVER ENZYME RESULTS:  Lab Results   Component Value Date    AST 20 01/11/2018    ALT 25 01/11/2018       CBC RESULTS:  Lab Results   Component Value Date    WBC 12.2 (H) 12/11/2017    RBC 4.90 12/11/2017    HGB 12.7 01/11/2018    HCT 39.7 12/11/2017    MCV 81 12/11/2017    MCH 26.5 12/11/2017    MCHC 32.7 12/11/2017    RDW 14.3 12/11/2017     12/11/2017       BMP RESULTS:  Lab Results   Component Value Date     05/07/2018    POTASSIUM 4.8 05/07/2018    CHLORIDE 101 05/07/2018    CO2 26 05/07/2018    ANIONGAP 17.8 05/07/2018    GLC 87 05/07/2018    BUN 12 05/07/2018    CR 0.80 05/07/2018    GFRESTIMATED 90 12/11/2017    GFRESTBLACK >90 12/11/2017    GERMAN 9.2  "05/07/2018        A1C RESULTS:  No results found for: A1C    INR RESULTS:  Lab Results   Component Value Date    INR 1.29 (H) 12/11/2017         ECHOCARDIOGRAM  No results found for this or any previous visit (from the past 8760 hour(s)).      No orders of the defined types were placed in this encounter.    No orders of the defined types were placed in this encounter.    There are no discontinued medications.      Encounter Diagnoses   Name Primary?     Typical atrial flutter (H) Yes     Palpitations      Benign essential hypertension          CC  Az Johnson MD  [address]  [place] [address]              Makenzie Blanco is a 50 year old female who is being evaluated via a billable video visit.      The patient has been notified of following:     \"This video visit will be conducted via a call between you and your physician/provider. We have found that certain health care needs can be provided without the need for an in-person physical exam.  This service lets us provide the care you need with a video conversation.  If a prescription is necessary we can send it directly to your pharmacy.  If lab work is needed we can place an order for that and you can then stop by our lab to have the test done at a later time.    Video visits are billed at different rates depending on your insurance coverage.  Please reach out to your insurance provider with any questions.    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 Yes  How would you like to obtain your AVS? Reyt    Patient would like the video invitation sent by: Text to cell phone: [phone]    Will anyone else be joining your video visit? No      Video-Visit Details    Type of service:  Video Visit    Video Start Time:   Video End Time:     Originating Location (pt. Location):     Distant Location (provider location):  [facility]     Mode of Communication:  Video Conference via [facility]

## 2020-05-26 ENCOUNTER — TELEPHONE (OUTPATIENT)
Dept: CARDIOLOGY | Facility: CLINIC | Age: 50
End: 2020-05-26

## 2020-05-26 NOTE — TELEPHONE ENCOUNTER
PATIENT WELLNESS TELEPHONE SCREENING     Step 1: Answer all screening questions.     1. In the past 3 weeks, have you been exposed to someone with a known positive illness below?  COVID-19 (known or suspected): NO  Chickenpox: NO  Measles: NO  Pertussis: NO    2. Do you have any of the following new symptoms or symptoms that have started within the past 14 days?   Fever (subjective or >100.0)?: NO   A new cough: NO   Shortness of breath: NO   Chills?NO   New loss of taste or smell?NO   Generalized body aches?NO   New persistent headache?NO   New sore throat?NO   Nausea, vomiting or diarrhea: NO    Step 2: If the patient is positive for symptoms, consult the ordering provider/consult IP to determine if the procedure is deemed necessary and inform the patient you will call them back.     Step 3 . If no symptoms, patient informed of the no visitor policy in place. YES    Step 4. If positive new symptoms, and the procedure is deemed necessary. Notify your manager/supervisor. The patient must be informed to call the procedural department.  A team member with the appropriate PPE will bring the mask to the patient at door 2. The patient will be brought to the procedural department and registered over the phone.

## 2020-05-27 ENCOUNTER — HOSPITAL ENCOUNTER (OUTPATIENT)
Dept: CARDIOLOGY | Facility: CLINIC | Age: 50
Discharge: HOME | End: 2020-05-27
Attending: INTERNAL MEDICINE | Admitting: INTERNAL MEDICINE
Payer: COMMERCIAL

## 2020-05-27 DIAGNOSIS — I48.0 PAROXYSMAL ATRIAL FIBRILLATION (H): ICD-10-CM

## 2020-05-27 PROCEDURE — 0296T ZIO PATCH HOLTER ADULT PEDIATRIC GREATER THAN 48 HRS: CPT

## 2020-05-27 PROCEDURE — 0298T ZIO PATCH HOLTER ADULT PEDIATRIC GREATER THAN 48 HRS: CPT | Performed by: INTERNAL MEDICINE

## 2020-06-08 ENCOUNTER — VIRTUAL VISIT (OUTPATIENT)
Dept: CARDIOLOGY | Facility: CLINIC | Age: 50
End: 2020-06-08
Attending: INTERNAL MEDICINE
Payer: COMMERCIAL

## 2020-06-08 ENCOUNTER — DOCUMENTATION ONLY (OUTPATIENT)
Dept: CARDIOLOGY | Facility: CLINIC | Age: 50
End: 2020-06-08

## 2020-06-08 DIAGNOSIS — I48.0 PAROXYSMAL ATRIAL FIBRILLATION (H): ICD-10-CM

## 2020-06-08 DIAGNOSIS — I10 BENIGN ESSENTIAL HYPERTENSION: Primary | ICD-10-CM

## 2020-06-08 PROCEDURE — 99214 OFFICE O/P EST MOD 30 MIN: CPT | Mod: TEL | Performed by: NURSE PRACTITIONER

## 2020-06-08 RX ORDER — TRIAMTERENE/HYDROCHLOROTHIAZID 37.5-25 MG
1 TABLET ORAL DAILY
Qty: 45 TABLET | Refills: 1 | Status: SHIPPED | OUTPATIENT
Start: 2020-06-08 | End: 2020-08-03

## 2020-06-08 NOTE — PROGRESS NOTES
" TELEPHONE VISIT    Makenzie Blanco is a 50 year old female who is being evaluated via a billable telephone visit.      The patient has been notified of following:     \"This telephone visit will be conducted via a call between you and your physician/provider. Given concern for spread of COVID 19 we are minimizing in person clinic visits when possible. We have found that certain health care needs can be provided without the need for a physical exam.  This service lets us provide the care you need with a short phone conversation.  If a prescription is necessary we can send it directly to your pharmacy.  If lab work is needed we can place an order for that and you can then stop by our lab to have the test done at a later time. If during the course of the call the physician/provider feels a telephone visit is not appropriate, you will not be charged for this service.\"       I have reviewed and updated the patient's Past Medical History, Social History, Family History and Medication List.    MEDICATIONS:  Current Outpatient Medications   Medication Sig Dispense Refill     atenolol (TENORMIN) 50 MG tablet Take 1 tablet (50 mg) by mouth daily 90 tablet 3     levothyroxine (SYNTHROID) 200 MCG tablet Take 200 mcg by mouth daily       rivaroxaban ANTICOAGULANT (XARELTO ANTICOAGULANT) 20 MG TABS tablet Take 1 tablet (20 mg) by mouth daily (with dinner) 90 tablet 0     triamterene-hydrochlorothiazide (MAXZIDE-25) 37.5-25 MG per tablet Take 1 tablet by mouth daily         ALLERGIES  Patient has no known allergies.    Review Of Systems  Skin: negative  Eyes: negative  Ears/Nose/Throat: negative  Respiratory: No shortness of breath and No dyspnea on exertion Sleep Apnea  Cardiovascular: lower extremity edema  Gastrointestinal: negative  Genitourinary: negative  Musculoskeletal: negative  Neurologic: negative  Psychiatric: negative  Hematologic/Lymphatic/Immunologic: negative  Endocrine: thyroid disorder    Rebekah Holguin CMA " 6/8/2020      Patient agreeable and consented for telephone visit:  Yes    HISTORY OF PRESENT ILLNESS  This is a 50 year old female who follows with [facility].  She is a patient of Dr. Johnson seen in our clinic for Paroxysmal afib/flutter, HTN, MILTON, and morbid obesity.     Ms Blanco underwent a cMRI (2016) due to a family history of hypertrophic CM.  This did not show any signs of hypertrophic CM but revealed asymmetric hypertrophy. (1.3 cm thickness)     She was found to have atypical atrial flutter (2016) which was self-terminated after 5 days.  There has not been recurrence since and has remained on Atenolol and Eliquis     Her ECHOs (2016 and 2018) showed LVEF 60% with asymmetric mild LVH without LVOTO. Normal RV function and no significant valvular pathology     She uses CPAP for sleep apnea.  When seen in April 2020, she complained on worsening dependent edema and was asked to increase her Maxzide by a 1/2 tab on days that she was standing for long periods.     Ms Blanco denies any palpitations.  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  She does check her pulse every day and states that her HR has been mainly in the 70s.  She denies any CP or SOB.  She took and extra dose of maxzide for a few days in April, but developed some lightheadedness.  However, she also was found to have a sinus infection.  She still gets some leg swelling that worsens towards the end of the day and improves some by morning.      A ZioPatch monitor (5/29/20)  This showed persistent Afib with average HR 78 bpm ( bpm)  There were rare PVCs    Ms Blanco desires to treat her A-fib conservatively and would prefer not to do any procedures unless necessary.          ASSESSMENT AND PLAN    Persistent Asymptomatic Afib  -initial episode (2016)  -recent ziopatch monitor shows persistent Afib with CVR   -no palpitations  -On Xarelto and denies bleeding  issues  -prefers to be conservatively managed  -will check ECHO, BMP, TSH    HTN:   -on Atenolol 50 mg, Maxzide 37.5/25mg   -BP controlled    Dependent edema   -infrequently takes extra 1/2 tab of maxzide day when standing long periods   -will check BMP and ECHO    Morbid obesity   -BMI 68     MILTON   -CPAP               I have reviewed the note as documented above.  This accurately captures the substance of my conversation with the patient.      Phone call contact time  Call Started at 10:33 am  Call Ended at 10:48 am    CELSO Luna, CNP

## 2020-06-08 NOTE — PROGRESS NOTES
Received Zio Patch report.     Patient has a visit with Filomena Ceballos NP today.                    .

## 2020-06-08 NOTE — LETTER
6/8/2020    Kenneth G. Pallas, MD  [facility] [address]  [facility] [address]    RE: Makenzie Blanco       Dear Colleague,    I had the pleasure of seeing Makenzie Blanco in the [facility].    HISTORY OF PRESENT ILLNESS  This is a 50 year old female who follows with [facility].  She is a patient of Dr. Johnson seen in our clinic for Paroxysmal afib/flutter, HTN, MILTON, and morbid obesity.     Ms Blanco underwent a cMRI (2016) due to a family history of hypertrophic CM.  This did not show any signs of hypertrophic CM but revealed asymmetric hypertrophy. (1.3 cm thickness)     She was found to have atypical atrial flutter (2016) which was self-terminated after 5 days.  There has not been recurrence since and has remained on Atenolol and Eliquis     Her ECHOs (2016 and 2018) showed LVEF 60% with asymmetric mild LVH without LVOTO. Normal RV function and no significant valvular pathology     She uses CPAP for sleep apnea.  When seen in April 2020, she complained on worsening dependent edema and was asked to increase her Maxzide by a 1/2 tab on days that she was standing for long periods.     Ms Blanco denies any palpitations.  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  She does check her pulse every day and states that her HR has been mainly in the 70s.  She denies any CP or SOB.  She took and extra dose of maxzide for a few days in April, but developed some lightheadedness.  However, she also was found to have a sinus infection.  She still gets some leg swelling that worsens towards the end of the day and improves some by morning.      A ZioPatch monitor (5/29/20)  This showed persistent Afib with average HR 78 bpm ( bpm)  There were rare PVCs    Ms Blanco desires to treat her A-fib conservatively and would prefer not to do any procedures unless  necessary.          ASSESSMENT AND PLAN    Persistent Asymptomatic Afib  -initial episode (2016)  -recent ziopatch monitor shows persistent Afib with CVR   -no palpitations  -On Xarelto and denies bleeding issues  -prefers to be conservatively managed  -will check ECHO, BMP, TSH    HTN:   -on Atenolol 50 mg, Maxzide 37.5/25mg   -BP controlled    Dependent edema   -infrequently takes extra 1/2 tab of maxzide day when standing long periods   -will check BMP and ECHO    Morbid obesity   -BMI 68     MILTON   -CPAP         I have reviewed the note as documented above.  This accurately captures the substance of my conversation with the patient.      Thank you for allowing me to participate in the care of your patient.    Sincerely,     CELSO Arora CNP     [facility] [place]

## 2020-06-09 ENCOUNTER — MYC MEDICAL ADVICE (OUTPATIENT)
Dept: CARDIOLOGY | Facility: CLINIC | Age: 50
End: 2020-06-09

## 2020-06-09 DIAGNOSIS — I48.3 TYPICAL ATRIAL FLUTTER (H): ICD-10-CM

## 2020-06-09 NOTE — TELEPHONE ENCOUNTER
Received a MyC message. Patient requested refill for Xarelto    OV (04-29-20) states patient is taking Eliquis. However, med list states patient is taking Xarelto.     On (03-29-20), patient was switch from Eliquis to Xarelto.     Rx Sent. 90 qty, with 3 refills.     Messaged Filomena Tucker JOHN w/update.      Delio RN, BSN  [facility] ~ [place], [place]  [phone]

## 2020-07-13 ENCOUNTER — TELEPHONE (OUTPATIENT)
Dept: CARDIOLOGY | Facility: CLINIC | Age: 50
End: 2020-07-13

## 2020-07-13 NOTE — TELEPHONE ENCOUNTER
Spoke to pt about the labs that were ordered to be done prior to OV with Dr Johnson.  They have not been scheduled, but pt will attempt to schedule on the day she has her echo on 7/15. Jonna

## 2020-07-14 ENCOUNTER — TELEPHONE (OUTPATIENT)
Dept: CARDIOLOGY | Facility: CLINIC | Age: 50
End: 2020-07-14

## 2020-07-14 NOTE — TELEPHONE ENCOUNTER

## 2020-07-15 ENCOUNTER — HOSPITAL ENCOUNTER (OUTPATIENT)
Dept: CARDIOLOGY | Facility: CLINIC | Age: 50
Discharge: HOME | End: 2020-07-15
Attending: NURSE PRACTITIONER | Admitting: NURSE PRACTITIONER
Payer: COMMERCIAL

## 2020-07-15 DIAGNOSIS — I48.0 PAROXYSMAL ATRIAL FIBRILLATION (H): ICD-10-CM

## 2020-07-15 LAB
ANION GAP SERPL CALCULATED.3IONS-SCNC: 6 MMOL/L (ref 3–14)
BUN SERPL-MCNC: 13 MG/DL (ref 7–30)
CALCIUM SERPL-MCNC: 8.9 MG/DL (ref 8.5–10.1)
CHLORIDE SERPL-SCNC: 103 MMOL/L (ref 94–109)
CO2 SERPL-SCNC: 28 MMOL/L (ref 20–32)
CREAT SERPL-MCNC: 0.76 MG/DL (ref 0.52–1.04)
GFR SERPL CREATININE-BSD FRML MDRD: >90 ML/MIN/{1.73_M2}
GLUCOSE SERPL-MCNC: 98 MG/DL (ref 70–99)
HGB BLD-MCNC: 13.3 G/DL (ref 11.7–15.7)
MAGNESIUM SERPL-MCNC: 2 MG/DL (ref 1.6–2.3)
POTASSIUM SERPL-SCNC: 4 MMOL/L (ref 3.4–5.3)
SODIUM SERPL-SCNC: 137 MMOL/L (ref 133–144)
TSH SERPL DL<=0.005 MIU/L-ACNC: 3.09 MU/L (ref 0.4–4)

## 2020-07-15 PROCEDURE — 93306 TTE W/DOPPLER COMPLETE: CPT | Mod: 26 | Performed by: INTERNAL MEDICINE

## 2020-07-15 PROCEDURE — 80048 BASIC METABOLIC PNL TOTAL CA: CPT | Performed by: NURSE PRACTITIONER

## 2020-07-15 PROCEDURE — 84443 ASSAY THYROID STIM HORMONE: CPT | Performed by: NURSE PRACTITIONER

## 2020-07-15 PROCEDURE — 25500064 ZZH RX 255 OP 636: Performed by: NURSE PRACTITIONER

## 2020-07-15 PROCEDURE — 36415 COLL VENOUS BLD VENIPUNCTURE: CPT | Performed by: NURSE PRACTITIONER

## 2020-07-15 PROCEDURE — 40000264 ECHOCARDIOGRAM COMPLETE

## 2020-07-15 PROCEDURE — 85018 HEMOGLOBIN: CPT | Performed by: NURSE PRACTITIONER

## 2020-07-15 PROCEDURE — 83735 ASSAY OF MAGNESIUM: CPT | Performed by: NURSE PRACTITIONER

## 2020-07-15 RX ADMIN — HUMAN ALBUMIN MICROSPHERES AND PERFLUTREN 3 ML: 10; .22 INJECTION, SOLUTION INTRAVENOUS at 10:15

## 2020-07-22 ENCOUNTER — VIRTUAL VISIT (OUTPATIENT)
Dept: CARDIOLOGY | Facility: CLINIC | Age: 50
End: 2020-07-22
Attending: NURSE PRACTITIONER
Payer: COMMERCIAL

## 2020-07-22 VITALS — BODY MASS INDEX: 66.61 KG/M2 | SYSTOLIC BLOOD PRESSURE: 116 MMHG | DIASTOLIC BLOOD PRESSURE: 81 MMHG | WEIGHT: 293 LBS

## 2020-07-22 DIAGNOSIS — I48.0 PAROXYSMAL ATRIAL FIBRILLATION (H): ICD-10-CM

## 2020-07-22 PROCEDURE — 99214 OFFICE O/P EST MOD 30 MIN: CPT | Mod: TEL | Performed by: INTERNAL MEDICINE

## 2020-07-22 NOTE — LETTER
7/22/2020    Kenneth G. Pallas, MD  [facility] [address]  [facility] [address]    RE: Maeknzie Blanco       Dear Colleague,    I had the pleasure of seeing Makenzie Blanco in the [facility].    Electrophysiology/ Virtual Clinic Note         H&P and Plan:   Patient opted to have a virtual visit due to ongoing issues related to ongoing COVID-19 virus pandemic and to minimize social interaction (based on CDC guidelines).        REASON FOR VISIT:  Evaluation of paroxysmal AFib/flutter.      HISTORY OF PRESENT ILLNESS:  Ms. Blanco is a pleasant 50-year-old lady with a history of hypertension, morbid obesity, obstructive sleep apnea and paroxysmal AFib/flutter who is here for evaluation.     Patient is on chronic therapy with atenolol and Xarelto.  Today, she informs she is doing well.  She denies any significant disease such as chest pain, shortness of breath, palpitations, lightheadedness, near-syncope or syncope.     She had a recent lab done that showed normal BMP and H&H.  A ZIO Patch monitor was repeated this year which confirmed that she is now in persistent atrial fibrillation with heart rate control (details below).    Previous studies:  - Echocardiogram (07/15/2020): Normal LV function.  EF estimated 60-65%.  No significant valve disease.    -Zio patch monitor (05/27/2020): Persistent atrial fibrillation.  Average heart rate of 78 bpm.    ASSESSMENT AND PLAN:   1.   Persistent atrial fibrillation.  She is now in persistent A. fib.  She is asymptomatic and heart rate is well controlled.  We discussed the possibly of cardioversion to attempt to restore normal rhythm.  She informs that she is very busy in her life right now as he she is building a house.  Since she is asymptomatic, she would like to continue current therapy.  We will continue atenolol at current dose and have her scheduled to follow-up in clinic in 3-4 months.  2.   Embolic prevention.   Chads vas score of 2.  Quench anticoagulation with Xarelto indefinitely.  3. Hypertension.  Blood pressure seems to be well controlled.  Continue therapy with atenolol and Maxide.           Az Johnson MD    Physical Exam:  Vitals: There were no vitals taken for this visit.    Constitutional:  Pt is in no acute distress.  Normal body habitus, upright.  HEAD: Normocephalic. No evidence of injury or laceration.   SKIN: Skin normal color with no lesions or eruptions. No xanthelasma.  ENT/Mouth:  Membranes moist. No nasal discharge or bleeding gums.   Neck:  Supple, normal JVP, no evidence of thyromegaly.  Chest/Lungs: No audible wheezing or labored breathing. Normal chest wall expansion. No cough.   Cardiovascular: Normal jugular venous pressure. No evidence of pitting edema bilaterally.   Abdomen: No evidence of abdominal distention. No observed jaundice.  Eyes: PERRL, EOMI. No scleral icterus, normal conjunctivae.  Extremities:  No lower extremity edema noticed.  No cyanosis or clubbing noted.   Neurologic: Normal arm motion bilateral.  No tremors. No evidence of focal defect.   Psychiatric: Alert and oriented x3, calm.         CURRENT MEDICATIONS:  Current Outpatient Medications   Medication Sig Dispense Refill     atenolol (TENORMIN) 50 MG tablet Take 1 tablet (50 mg) by mouth daily 90 tablet 3     levothyroxine (SYNTHROID) 200 MCG tablet Take 200 mcg by mouth daily       rivaroxaban ANTICOAGULANT (XARELTO ANTICOAGULANT) 20 MG TABS tablet Take 1 tablet (20 mg) by mouth daily (with dinner) 90 tablet 3     triamterene-HCTZ (MAXZIDE-25) 37.5-25 MG tablet Take 1 tablet by mouth daily 45 tablet 1       ALLERGIES   No Known Allergies    PAST MEDICAL HISTORY:  Past Medical History:   Diagnosis Date     Abdominal pain 12/11/2017     Adjustment disorder with mixed anxiety and depressed mood 5/6/2014     Anxiety      Arrhythmia     afib     Cardiomyopathy (H)      Chronic ear infection      Gastroesophageal reflux  disease      HTN (hypertension)      Hypothyroid      LVH (left ventricular hypertrophy)      Morbid obesity (H)      MILTON (obstructive sleep apnea)     CPAP     Palpitations      Typical atrial flutter (H) 5/24/2016       PAST SURGICAL HISTORY:  Past Surgical History:   Procedure Laterality Date     dental extractions      wisdom teeth     GYN SURGERY      D&C     LAPAROSCOPIC CHOLECYSTECTOMY N/A 12/13/2017    Procedure: LAPAROSCOPIC CHOLECYSTECTOMY;  LAPAROSCOPIC CHOLECYSTECTOMY ;  Surgeon: Kvng Davidson MD;  Location: [facility] OR       FAMILY HISTORY:  Family History   Problem Relation Age of Onset     Hypertension Mother        SOCIAL HISTORY:  Social History     Socioeconomic History     Marital status:      Spouse name: None     Number of children: None     Years of education: None     Highest education level: None   Occupational History     None   Social Needs     Financial resource strain: None     Food insecurity     Worry: None     Inability: None     Transportation needs     Medical: None     Non-medical: None   Tobacco Use     Smoking status: Never Smoker     Smokeless tobacco: Never Used   Substance and Sexual Activity     Alcohol use: Yes     Alcohol/week: 0.0 standard drinks     Comment: occasional      Drug use: No     Sexual activity: None   Lifestyle     Physical activity     Days per week: None     Minutes per session: None     Stress: None   Relationships     Social connections     Talks on phone: None     Gets together: None     Attends Orthodoxy service: None     Active member of club or organization: None     Attends meetings of clubs or organizations: None     Relationship status: None     Intimate partner violence     Fear of current or ex partner: None     Emotionally abused: None     Physically abused: None     Forced sexual activity: None   Other Topics Concern      Service Not Asked     Blood Transfusions Not Asked     Caffeine Concern Yes     Comment: 1 cups of coffee  a day      Occupational Exposure Not Asked     Hobby Hazards Not Asked     Sleep Concern Not Asked     Stress Concern Not Asked     Weight Concern Not Asked     Special Diet No     Back Care Not Asked     Exercise No     Bike Helmet Not Asked     Seat Belt Not Asked     Self-Exams Not Asked     Parent/sibling w/ CABG, MI or angioplasty before 65F 55M? Not Asked   Social History Narrative     None       Review of Systems:  Skin:        Eyes:       ENT:       Respiratory:       Cardiovascular:       Gastroenterology:      Genitourinary:       Musculoskeletal:       Neurologic:       Psychiatric:       Heme/Lymph/Imm:       Endocrine:           Recent Lab Results:  LIPID RESULTS:  Lab Results   Component Value Date    CHOL 160 03/30/2015    HDL 52 03/30/2015    LDL 88 03/30/2015    TRIG 100 03/30/2015       LIVER ENZYME RESULTS:  Lab Results   Component Value Date    AST 20 01/11/2018    ALT 25 01/11/2018       CBC RESULTS:  Lab Results   Component Value Date    WBC 12.2 (H) 12/11/2017    RBC 4.90 12/11/2017    HGB 13.3 07/15/2020    HCT 39.7 12/11/2017    MCV 81 12/11/2017    MCH 26.5 12/11/2017    MCHC 32.7 12/11/2017    RDW 14.3 12/11/2017     12/11/2017       BMP RESULTS:  Lab Results   Component Value Date     07/15/2020    POTASSIUM 4.0 07/15/2020    CHLORIDE 103 07/15/2020    CO2 28 07/15/2020    ANIONGAP 6 07/15/2020    GLC 98 07/15/2020    BUN 13 07/15/2020    CR 0.76 07/15/2020    GFRESTIMATED >90 07/15/2020    GFRESTBLACK >90 07/15/2020    GERMAN 8.9 07/15/2020        A1C RESULTS:  No results found for: A1C    INR RESULTS:  Lab Results   Component Value Date    INR 1.29 (H) 12/11/2017         ECHOCARDIOGRAM  No results found for this or any previous visit (from the past 8760 hour(s)).      No orders of the defined types were placed in this encounter.    No orders of the defined types were placed in this encounter.    There are no discontinued medications.      No diagnosis found.      Thank you for  allowing me to participate in the care of your patient.    Sincerely,     Az Johnson MD     [facility]

## 2020-07-22 NOTE — PROGRESS NOTES
Electrophysiology/ Virtual Clinic Note         H&P and Plan:   Patient opted to have a virtual visit due to ongoing issues related to ongoing COVID-19 virus pandemic and to minimize social interaction (based on CDC guidelines).      Visit details:  Patient has given verbal consent for this visit.    Mode of transmission:Doximity.  Visit start time: 2:04 PM  Visit stop time: 2:16 PM  Provider location: Office.  Patient location: Home.       REASON FOR VISIT:  Evaluation of paroxysmal AFib/flutter.      HISTORY OF PRESENT ILLNESS:  Ms. Blanco is a pleasant 50-year-old lady with a history of hypertension, morbid obesity, obstructive sleep apnea and paroxysmal AFib/flutter who is here for evaluation.     Patient is on chronic therapy with atenolol and Xarelto.  Today, she informs she is doing well.  She denies any significant disease such as chest pain, shortness of breath, palpitations, lightheadedness, near-syncope or syncope.     She had a recent lab done that showed normal BMP and H&H.  A ZIO Patch monitor was repeated this year which confirmed that she is now in persistent atrial fibrillation with heart rate control (details below).    Previous studies:  - Echocardiogram (07/15/2020): Normal LV function.  EF estimated 60-65%.  No significant valve disease.    -Zio patch monitor (05/27/2020): Persistent atrial fibrillation.  Average heart rate of 78 bpm.    ASSESSMENT AND PLAN:   1.   Persistent atrial fibrillation.  She is now in persistent A. fib.  She is asymptomatic and heart rate is well controlled.  We discussed the possibly of cardioversion to attempt to restore normal rhythm.  She informs that she is very busy in her life right now as he she is building a house.  Since she is asymptomatic, she would like to continue current therapy.  We will continue atenolol at current dose and have her scheduled to follow-up in clinic in 3-4 months.  2.   Embolic prevention.  Chads vas score of 2.  Quench anticoagulation  with Xarelto indefinitely.  3. Hypertension.  Blood pressure seems to be well controlled.  Continue therapy with atenolol and Maxide.           Az Johnson MD    Physical Exam:  Vitals: There were no vitals taken for this visit.    Constitutional:  Pt is in no acute distress.  Normal body habitus, upright.  HEAD: Normocephalic. No evidence of injury or laceration.   SKIN: Skin normal color with no lesions or eruptions. No xanthelasma.  ENT/Mouth:  Membranes moist. No nasal discharge or bleeding gums.   Neck:  Supple, normal JVP, no evidence of thyromegaly.  Chest/Lungs: No audible wheezing or labored breathing. Normal chest wall expansion. No cough.   Cardiovascular: Normal jugular venous pressure. No evidence of pitting edema bilaterally.   Abdomen: No evidence of abdominal distention. No observed jaundice.  Eyes: PERRL, EOMI. No scleral icterus, normal conjunctivae.  Extremities:  No lower extremity edema noticed.  No cyanosis or clubbing noted.   Neurologic: Normal arm motion bilateral.  No tremors. No evidence of focal defect.   Psychiatric: Alert and oriented x3, calm.         CURRENT MEDICATIONS:  Current Outpatient Medications   Medication Sig Dispense Refill     atenolol (TENORMIN) 50 MG tablet Take 1 tablet (50 mg) by mouth daily 90 tablet 3     levothyroxine (SYNTHROID) 200 MCG tablet Take 200 mcg by mouth daily       rivaroxaban ANTICOAGULANT (XARELTO ANTICOAGULANT) 20 MG TABS tablet Take 1 tablet (20 mg) by mouth daily (with dinner) 90 tablet 3     triamterene-HCTZ (MAXZIDE-25) 37.5-25 MG tablet Take 1 tablet by mouth daily 45 tablet 1       ALLERGIES   No Known Allergies    PAST MEDICAL HISTORY:  Past Medical History:   Diagnosis Date     Abdominal pain 12/11/2017     Adjustment disorder with mixed anxiety and depressed mood 5/6/2014     Anxiety      Arrhythmia     afib     Cardiomyopathy (H)      Chronic ear infection      Gastroesophageal reflux disease      HTN (hypertension)      Hypothyroid       LVH (left ventricular hypertrophy)      Morbid obesity (H)      MILTON (obstructive sleep apnea)     CPAP     Palpitations      Typical atrial flutter (H) 5/24/2016       PAST SURGICAL HISTORY:  Past Surgical History:   Procedure Laterality Date     dental extractions      wisdom teeth     GYN SURGERY      D&C     LAPAROSCOPIC CHOLECYSTECTOMY N/A 12/13/2017    Procedure: LAPAROSCOPIC CHOLECYSTECTOMY;  LAPAROSCOPIC CHOLECYSTECTOMY ;  Surgeon: Kvng Davidson MD;  Location: [facility] OR       FAMILY HISTORY:  Family History   Problem Relation Age of Onset     Hypertension Mother        SOCIAL HISTORY:  Social History     Socioeconomic History     Marital status:      Spouse name: None     Number of children: None     Years of education: None     Highest education level: None   Occupational History     None   Social Needs     Financial resource strain: None     Food insecurity     Worry: None     Inability: None     Transportation needs     Medical: None     Non-medical: None   Tobacco Use     Smoking status: Never Smoker     Smokeless tobacco: Never Used   Substance and Sexual Activity     Alcohol use: Yes     Alcohol/week: 0.0 standard drinks     Comment: occasional      Drug use: No     Sexual activity: None   Lifestyle     Physical activity     Days per week: None     Minutes per session: None     Stress: None   Relationships     Social connections     Talks on phone: None     Gets together: None     Attends Taoist service: None     Active member of club or organization: None     Attends meetings of clubs or organizations: None     Relationship status: None     Intimate partner violence     Fear of current or ex partner: None     Emotionally abused: None     Physically abused: None     Forced sexual activity: None   Other Topics Concern      Service Not Asked     Blood Transfusions Not Asked     Caffeine Concern Yes     Comment: 1 cups of coffee a day      Occupational Exposure Not Asked     Hobby  Hazards Not Asked     Sleep Concern Not Asked     Stress Concern Not Asked     Weight Concern Not Asked     Special Diet No     Back Care Not Asked     Exercise No     Bike Helmet Not Asked     Seat Belt Not Asked     Self-Exams Not Asked     Parent/sibling w/ CABG, MI or angioplasty before 65F 55M? Not Asked   Social History Narrative     None       Review of Systems:  Skin:        Eyes:       ENT:       Respiratory:       Cardiovascular:       Gastroenterology:      Genitourinary:       Musculoskeletal:       Neurologic:       Psychiatric:       Heme/Lymph/Imm:       Endocrine:           Recent Lab Results:  LIPID RESULTS:  Lab Results   Component Value Date    CHOL 160 03/30/2015    HDL 52 03/30/2015    LDL 88 03/30/2015    TRIG 100 03/30/2015       LIVER ENZYME RESULTS:  Lab Results   Component Value Date    AST 20 01/11/2018    ALT 25 01/11/2018       CBC RESULTS:  Lab Results   Component Value Date    WBC 12.2 (H) 12/11/2017    RBC 4.90 12/11/2017    HGB 13.3 07/15/2020    HCT 39.7 12/11/2017    MCV 81 12/11/2017    MCH 26.5 12/11/2017    MCHC 32.7 12/11/2017    RDW 14.3 12/11/2017     12/11/2017       BMP RESULTS:  Lab Results   Component Value Date     07/15/2020    POTASSIUM 4.0 07/15/2020    CHLORIDE 103 07/15/2020    CO2 28 07/15/2020    ANIONGAP 6 07/15/2020    GLC 98 07/15/2020    BUN 13 07/15/2020    CR 0.76 07/15/2020    GFRESTIMATED >90 07/15/2020    GFRESTBLACK >90 07/15/2020    GERMAN 8.9 07/15/2020        A1C RESULTS:  No results found for: A1C    INR RESULTS:  Lab Results   Component Value Date    INR 1.29 (H) 12/11/2017         ECHOCARDIOGRAM  No results found for this or any previous visit (from the past 8760 hour(s)).      No orders of the defined types were placed in this encounter.    No orders of the defined types were placed in this encounter.    There are no discontinued medications.      No diagnosis found.      CC  CELSO Luna CNP  [address]  [place]  [id number]

## 2020-07-22 NOTE — PROGRESS NOTES
"Makenzie Blanco is a 50 year old female who is being evaluated via a billable telephone visit.      The patient has been notified of following:     \"This telephone visit will be conducted via a call between you and your physician/provider. We have found that certain health care needs can be provided without the need for a physical exam.  This service lets us provide the care you need with a short phone conversation.  If a prescription is necessary we can send it directly to your pharmacy.  If lab work is needed we can place an order for that and you can then stop by our lab to have the test done at a later time.    Telephone visits are billed at different rates depending on your insurance coverage. During this emergency period, for some insurers they may be billed the same as an in-person visit.  Please reach out to your insurance provider with any questions.    If during the course of the call the physician/provider feels a telephone visit is not appropriate, you will not be charged for this service.\"    Patient has given verbal consent for Telephone visit?  Yes    [phone]  Pt reported wt today 382#, BP-116/81  Penny RODAS      What phone number would you like to be contacted at? [phone]    How would you like to obtain your AVS? MyChart      "

## 2020-08-03 DIAGNOSIS — I10 BENIGN ESSENTIAL HYPERTENSION: ICD-10-CM

## 2020-08-03 RX ORDER — TRIAMTERENE/HYDROCHLOROTHIAZID 37.5-25 MG
1 TABLET ORAL DAILY
Qty: 90 TABLET | Refills: 3 | Status: SHIPPED | OUTPATIENT
Start: 2020-08-03 | End: 2021-06-24

## 2020-08-03 NOTE — TELEPHONE ENCOUNTER
Medication Refilled: triamterene-hydrochlorothiazide   Last office visit: 7/22/20 w/ Dr. Johnson  Last Labs/EKG: BMP 7/15/20  Next office visit: Due 7/2020 per visit note. Entered order for annual follow-up in 7/2021.  Pharmacy sent to: [facility]

## 2021-01-15 ENCOUNTER — HEALTH MAINTENANCE LETTER (OUTPATIENT)
Age: 51
End: 2021-01-15

## 2021-02-15 DIAGNOSIS — I10 BENIGN ESSENTIAL HYPERTENSION: ICD-10-CM

## 2021-02-15 RX ORDER — ATENOLOL 50 MG/1
50 TABLET ORAL DAILY
Qty: 90 TABLET | Refills: 3 | Status: SHIPPED | OUTPATIENT
Start: 2021-02-15 | End: 2021-12-13

## 2021-03-14 ENCOUNTER — HEALTH MAINTENANCE LETTER (OUTPATIENT)
Age: 51
End: 2021-03-14

## 2021-03-29 ENCOUNTER — IMMUNIZATION (OUTPATIENT)
Dept: NURSING | Facility: CLINIC | Age: 51
End: 2021-03-29
Payer: COMMERCIAL

## 2021-03-29 PROCEDURE — 91300 PR COVID VAC PFIZER DIL RECON 30 MCG/0.3 ML IM: CPT

## 2021-03-29 PROCEDURE — 0001A PR COVID VAC PFIZER DIL RECON 30 MCG/0.3 ML IM: CPT

## 2021-04-19 ENCOUNTER — IMMUNIZATION (OUTPATIENT)
Dept: NURSING | Facility: CLINIC | Age: 51
End: 2021-04-19
Attending: INTERNAL MEDICINE
Payer: COMMERCIAL

## 2021-04-19 PROCEDURE — 91300 PR COVID VAC PFIZER DIL RECON 30 MCG/0.3 ML IM: CPT

## 2021-04-19 PROCEDURE — 0002A PR COVID VAC PFIZER DIL RECON 30 MCG/0.3 ML IM: CPT

## 2021-06-03 DIAGNOSIS — I48.3 TYPICAL ATRIAL FLUTTER (H): ICD-10-CM

## 2021-06-03 NOTE — LETTER
Makenzie Balnco  [address] [facility] [address]         Dear Makenzie Blanco,      Our records indicate that you are due for follow-up with your Heart Care  Provider.      As part of our effort to provide you with the best care possible, we have  expanded our services to include video visits in addition to in-person clinic visits.    Please call our office at [phone] to schedule your appointment.    Thank you for allowing [facility] to be a part of your health  care team.     Sincerely,      [facility]

## 2021-06-03 NOTE — TELEPHONE ENCOUNTER
Received refill request for:  Xarelto  Last OV was: 7/22/2020 with Dr. Johnson  Labs/EKG: n/a  F/U scheduled: orders in Epic for 7/2021, not yet scheduled.  Note to pharmacy and letter sent.  New script sent to: [facility]

## 2021-06-24 DIAGNOSIS — I10 BENIGN ESSENTIAL HYPERTENSION: ICD-10-CM

## 2021-06-24 RX ORDER — TRIAMTERENE/HYDROCHLOROTHIAZID 37.5-25 MG
1 TABLET ORAL DAILY
Qty: 90 TABLET | Refills: 0 | Status: SHIPPED | OUTPATIENT
Start: 2021-06-24 | End: 2021-09-17

## 2021-08-27 DIAGNOSIS — I48.3 TYPICAL ATRIAL FLUTTER (H): ICD-10-CM

## 2021-08-27 NOTE — TELEPHONE ENCOUNTER
Received refill request for:  Xarelto  Last OV was: 7/2020 with Dr. Johnson  Labs/EKG: N/a  F/U scheduled: Overdue orders for 7/2021. Letters sent x 2. No appt scheduled. Sent 90 day Rx, no refills. Note to pharmacy that pt needs an appt. Messaged scheduling to call pt for third contact attempt to arrange follow up.   New script sent to: [facility]

## 2021-10-24 ENCOUNTER — HEALTH MAINTENANCE LETTER (OUTPATIENT)
Age: 51
End: 2021-10-24

## 2021-11-29 ENCOUNTER — MYC REFILL (OUTPATIENT)
Dept: CARDIOLOGY | Facility: CLINIC | Age: 51
End: 2021-11-29
Payer: COMMERCIAL

## 2021-11-29 DIAGNOSIS — I48.3 TYPICAL ATRIAL FLUTTER (H): ICD-10-CM

## 2021-11-29 NOTE — TELEPHONE ENCOUNTER
Medication Refilled: Xarelto  Last office visit: 7/22/2020  Last Labs/EKG: NA  Next office visit: OVERDUE Orders for 7/2021 with EP - need follow up letter sent - note to pharmacy   Pharmacy sent to: [facility]

## 2021-11-29 NOTE — LETTER
November 29, 2021       TO: Makenzie Blanco  [address] [facility] [place] [address]       Dear Makenzie Blanco,    We recently received a call from your pharmacy requesting a refill of your medication(s).    Our records indicate that you are due for follow-up with your Heart Care Provider. We will refill your medications for 3 months which will allow you enough time to be seen.    Please call [phone] to schedule your appointment.    Thank you for allowing [facility] to be a part of your health care team and we look forward to seeing you soon.    Thank you,    [facility]

## 2021-12-07 SDOH — ECONOMIC STABILITY: INCOME INSECURITY: HOW HARD IS IT FOR YOU TO PAY FOR THE VERY BASICS LIKE FOOD, HOUSING, MEDICAL CARE, AND HEATING?: NOT HARD AT ALL

## 2021-12-07 SDOH — ECONOMIC STABILITY: FOOD INSECURITY: WITHIN THE PAST 12 MONTHS, YOU WORRIED THAT YOUR FOOD WOULD RUN OUT BEFORE YOU GOT MONEY TO BUY MORE.: NEVER TRUE

## 2021-12-07 SDOH — HEALTH STABILITY: PHYSICAL HEALTH: ON AVERAGE, HOW MANY MINUTES DO YOU ENGAGE IN EXERCISE AT THIS LEVEL?: 0 MIN

## 2021-12-07 SDOH — ECONOMIC STABILITY: TRANSPORTATION INSECURITY
IN THE PAST 12 MONTHS, HAS LACK OF TRANSPORTATION KEPT YOU FROM MEETINGS, WORK, OR FROM GETTING THINGS NEEDED FOR DAILY LIVING?: NO

## 2021-12-07 SDOH — ECONOMIC STABILITY: INCOME INSECURITY: IN THE LAST 12 MONTHS, WAS THERE A TIME WHEN YOU WERE NOT ABLE TO PAY THE MORTGAGE OR RENT ON TIME?: NO

## 2021-12-07 SDOH — ECONOMIC STABILITY: FOOD INSECURITY: WITHIN THE PAST 12 MONTHS, THE FOOD YOU BOUGHT JUST DIDN'T LAST AND YOU DIDN'T HAVE MONEY TO GET MORE.: NEVER TRUE

## 2021-12-07 SDOH — HEALTH STABILITY: PHYSICAL HEALTH: ON AVERAGE, HOW MANY DAYS PER WEEK DO YOU ENGAGE IN MODERATE TO STRENUOUS EXERCISE (LIKE A BRISK WALK)?: 0 DAYS

## 2021-12-07 SDOH — ECONOMIC STABILITY: TRANSPORTATION INSECURITY
IN THE PAST 12 MONTHS, HAS THE LACK OF TRANSPORTATION KEPT YOU FROM MEDICAL APPOINTMENTS OR FROM GETTING MEDICATIONS?: NO

## 2021-12-07 ASSESSMENT — ENCOUNTER SYMPTOMS
HEADACHES: 0
ARTHRALGIAS: 0
SORE THROAT: 0
COUGH: 0
BREAST MASS: 0
PARESTHESIAS: 0
FEVER: 0
DYSURIA: 0
HEARTBURN: 0
PALPITATIONS: 0
FREQUENCY: 0
SHORTNESS OF BREATH: 0
HEMATURIA: 1
CHILLS: 0
NAUSEA: 0
DIARRHEA: 0
DIZZINESS: 0
JOINT SWELLING: 0
MYALGIAS: 0
EYE PAIN: 0
WEAKNESS: 0
NERVOUS/ANXIOUS: 0
ABDOMINAL PAIN: 0
HEMATOCHEZIA: 0
CONSTIPATION: 0

## 2021-12-07 ASSESSMENT — SOCIAL DETERMINANTS OF HEALTH (SDOH)
HOW OFTEN DO YOU ATTEND CHURCH OR RELIGIOUS SERVICES?: MORE THAN 4 TIMES PER YEAR
HOW OFTEN DO YOU GET TOGETHER WITH FRIENDS OR RELATIVES?: MORE THAN THREE TIMES A WEEK
DO YOU BELONG TO ANY CLUBS OR ORGANIZATIONS SUCH AS CHURCH GROUPS UNIONS, FRATERNAL OR ATHLETIC GROUPS, OR SCHOOL GROUPS?: NO
IN A TYPICAL WEEK, HOW MANY TIMES DO YOU TALK ON THE PHONE WITH FAMILY, FRIENDS, OR NEIGHBORS?: MORE THAN THREE TIMES A WEEK

## 2021-12-07 ASSESSMENT — LIFESTYLE VARIABLES
HOW MANY STANDARD DRINKS CONTAINING ALCOHOL DO YOU HAVE ON A TYPICAL DAY: PATIENT DECLINED
HOW OFTEN DO YOU HAVE A DRINK CONTAINING ALCOHOL: NEVER
HOW OFTEN DO YOU HAVE SIX OR MORE DRINKS ON ONE OCCASION: NEVER

## 2021-12-09 ENCOUNTER — E-VISIT (OUTPATIENT)
Dept: URGENT CARE | Facility: CLINIC | Age: 51
End: 2021-12-09
Payer: COMMERCIAL

## 2021-12-09 DIAGNOSIS — N39.0 ACUTE UTI (URINARY TRACT INFECTION): Primary | ICD-10-CM

## 2021-12-09 PROCEDURE — 99421 OL DIG E/M SVC 5-10 MIN: CPT | Performed by: FAMILY MEDICINE

## 2021-12-09 RX ORDER — NITROFURANTOIN 25; 75 MG/1; MG/1
100 CAPSULE ORAL 2 TIMES DAILY
Qty: 10 CAPSULE | Refills: 0 | Status: SHIPPED | OUTPATIENT
Start: 2021-12-09 | End: 2021-12-14

## 2021-12-09 NOTE — PATIENT INSTRUCTIONS
Dear Makenzie Blanco    After reviewing your responses, I've been able to diagnose you with a urinary tract infection, which is a common infection of the bladder with bacteria.  This is not a sexually transmitted infection, though urinating immediately after intercourse can help prevent infections.  Drinking lots of fluids is also helpful to clear your current infection and prevent the next one.      I have sent a prescription for antibiotics to your pharmacy to treat this infection.    It is important that you take all of your prescribed medication even if your symptoms are improving after a few doses.  Taking all of your medicine helps prevent the symptoms from returning.     If your symptoms worsen, you develop pain in your back or stomach, develop fevers, or are not improving in 5 days, please contact your primary care provider for an appointment or visit any of our convenient Walk-in or Urgent Care Centers to be seen, which can be found on our website here.    Thanks again for choosing us as your health care partner,    Concepcion Deleon MD

## 2021-12-13 ENCOUNTER — OFFICE VISIT (OUTPATIENT)
Dept: FAMILY MEDICINE | Facility: CLINIC | Age: 51
End: 2021-12-13
Payer: COMMERCIAL

## 2021-12-13 VITALS
WEIGHT: 293 LBS | BODY MASS INDEX: 68.35 KG/M2 | RESPIRATION RATE: 18 BRPM | SYSTOLIC BLOOD PRESSURE: 130 MMHG | TEMPERATURE: 98.4 F | OXYGEN SATURATION: 96 % | HEART RATE: 106 BPM | DIASTOLIC BLOOD PRESSURE: 88 MMHG

## 2021-12-13 DIAGNOSIS — E89.0 POSTABLATIVE HYPOTHYROIDISM: ICD-10-CM

## 2021-12-13 DIAGNOSIS — Z30.432 ENCOUNTER FOR IUD REMOVAL: ICD-10-CM

## 2021-12-13 DIAGNOSIS — I48.0 PAROXYSMAL ATRIAL FIBRILLATION (H): ICD-10-CM

## 2021-12-13 DIAGNOSIS — E66.01 MORBID OBESITY (H): ICD-10-CM

## 2021-12-13 DIAGNOSIS — I10 BENIGN ESSENTIAL HYPERTENSION: ICD-10-CM

## 2021-12-13 DIAGNOSIS — Z12.31 ENCOUNTER FOR SCREENING MAMMOGRAM FOR BREAST CANCER: ICD-10-CM

## 2021-12-13 DIAGNOSIS — R31.9 HEMATURIA, UNSPECIFIED TYPE: ICD-10-CM

## 2021-12-13 DIAGNOSIS — Z23 COVID-19 VACCINE ADMINISTERED: ICD-10-CM

## 2021-12-13 DIAGNOSIS — Z00.00 ROUTINE GENERAL MEDICAL EXAMINATION AT A HEALTH CARE FACILITY: Primary | ICD-10-CM

## 2021-12-13 DIAGNOSIS — Z12.4 SCREENING FOR CERVICAL CANCER: ICD-10-CM

## 2021-12-13 PROBLEM — R10.9 ABDOMINAL PAIN: Status: RESOLVED | Noted: 2017-12-11 | Resolved: 2021-12-13

## 2021-12-13 LAB
CLUE CELLS: ABNORMAL
FSH SERPL-ACNC: 14.3 IU/L
HBA1C MFR BLD: 5.5 % (ref 0–5.6)
TRICHOMONAS, WET PREP: ABNORMAL
WBC'S/HIGH POWER FIELD, WET PREP: ABNORMAL
YEAST, WET PREP: ABNORMAL

## 2021-12-13 PROCEDURE — 80053 COMPREHEN METABOLIC PANEL: CPT | Performed by: FAMILY MEDICINE

## 2021-12-13 PROCEDURE — 84439 ASSAY OF FREE THYROXINE: CPT | Performed by: FAMILY MEDICINE

## 2021-12-13 PROCEDURE — 83001 ASSAY OF GONADOTROPIN (FSH): CPT | Performed by: FAMILY MEDICINE

## 2021-12-13 PROCEDURE — 84443 ASSAY THYROID STIM HORMONE: CPT | Performed by: FAMILY MEDICINE

## 2021-12-13 PROCEDURE — G0145 SCR C/V CYTO,THINLAYER,RESCR: HCPCS | Performed by: FAMILY MEDICINE

## 2021-12-13 PROCEDURE — 80061 LIPID PANEL: CPT | Performed by: FAMILY MEDICINE

## 2021-12-13 PROCEDURE — 0004A COVID-19,PF,PFIZER (12+ YRS): CPT | Performed by: FAMILY MEDICINE

## 2021-12-13 PROCEDURE — 99386 PREV VISIT NEW AGE 40-64: CPT | Mod: 25 | Performed by: FAMILY MEDICINE

## 2021-12-13 PROCEDURE — G0124 SCREEN C/V THIN LAYER BY MD: HCPCS | Performed by: PATHOLOGY

## 2021-12-13 PROCEDURE — 36415 COLL VENOUS BLD VENIPUNCTURE: CPT | Performed by: FAMILY MEDICINE

## 2021-12-13 PROCEDURE — 83036 HEMOGLOBIN GLYCOSYLATED A1C: CPT | Performed by: FAMILY MEDICINE

## 2021-12-13 PROCEDURE — 91300 COVID-19,PF,PFIZER (12+ YRS): CPT | Performed by: FAMILY MEDICINE

## 2021-12-13 PROCEDURE — 58301 REMOVE INTRAUTERINE DEVICE: CPT | Performed by: FAMILY MEDICINE

## 2021-12-13 PROCEDURE — 87210 SMEAR WET MOUNT SALINE/INK: CPT | Performed by: FAMILY MEDICINE

## 2021-12-13 PROCEDURE — 99214 OFFICE O/P EST MOD 30 MIN: CPT | Mod: 25 | Performed by: FAMILY MEDICINE

## 2021-12-13 PROCEDURE — 87624 HPV HI-RISK TYP POOLED RSLT: CPT | Performed by: FAMILY MEDICINE

## 2021-12-13 RX ORDER — ATENOLOL 50 MG/1
50 TABLET ORAL DAILY
Qty: 90 TABLET | Refills: 3 | Status: SHIPPED | OUTPATIENT
Start: 2021-12-13 | End: 2022-12-05

## 2021-12-13 RX ORDER — TRIAMTERENE/HYDROCHLOROTHIAZID 37.5-25 MG
1 TABLET ORAL DAILY
Qty: 90 TABLET | Refills: 3 | Status: SHIPPED | OUTPATIENT
Start: 2021-12-13 | End: 2022-12-05

## 2021-12-13 ASSESSMENT — ENCOUNTER SYMPTOMS
JOINT SWELLING: 0
CHILLS: 0
BREAST MASS: 0
SHORTNESS OF BREATH: 0
DIARRHEA: 0
PARESTHESIAS: 0
HEMATOCHEZIA: 0
DIZZINESS: 0
FEVER: 0
EYE PAIN: 0
NAUSEA: 0
SORE THROAT: 0
HEARTBURN: 0
HEADACHES: 0
PALPITATIONS: 0
DYSURIA: 0
HEMATURIA: 1
MYALGIAS: 0
FREQUENCY: 0
COUGH: 0
NERVOUS/ANXIOUS: 0
ABDOMINAL PAIN: 0
CONSTIPATION: 0
ARTHRALGIAS: 0
WEAKNESS: 0

## 2021-12-13 NOTE — PROGRESS NOTES
Assessment & Plan     Routine general medical examination at a health care facility  - Follicle stimulating hormone; Future  - Lipid panel reflex to direct LDL Fasting; Future  - Hemoglobin A1c; Future  - Follicle stimulating hormone  - Lipid panel reflex to direct LDL Fasting  - Hemoglobin A1c    Paroxysmal atrial fibrillation (H) - stable, on anti coag and BB    Hematuria, unspecified type - discussed this is likely menstrual blood based on her exam today.  - Wet prep - lab collect; Future  - Wet prep - lab collect    Postablative hypothyroidism - surveillance labs  - TSH; Future  - T4, free; Future  - TSH  - T4, free    Benign essential hypertension - controlled, continue current, surveillance labs  - triamterene-HCTZ (MAXZIDE-25) 37.5-25 MG tablet; Take 1 tablet by mouth daily  - atenolol (TENORMIN) 50 MG tablet; Take 1 tablet (50 mg) by mouth daily  - Comprehensive metabolic panel (BMP + Alb, Alk Phos, ALT, AST, Total. Bili, TP); Future  - Comprehensive metabolic panel (BMP + Alb, Alk Phos, ALT, AST, Total. Bili, TP)    Morbid obesity (H)     Encounter for screening mammogram for breast cancer  - MA Screen Bilateral w/Yvon; Future    Screening for cervical cancer  - Pap screen with HPV - recommended age 30 - 65 years    Encounter for IUD removal  - REMOVE INTRAUTERINE DEVICE    Return in about 1 year (around 12/13/2022) for Yearly Preventive Exam.    Ariana Padilla MD  [facility] ROSA Banegas is a 51 year old who presents for the following health issues     UTI  Pertinent negatives include no abdominal pain, arthralgias, chest pain, chills, congestion, coughing, fever, headaches, joint swelling, myalgias, nausea, rash, sore throat or weakness.   Healthy Habits:     Getting at least 3 servings of Calcium per day:  Yes    Bi-annual eye exam:  Yes    Dental care twice a year:  NO    Sleep apnea or symptoms of sleep apnea:  Sleep apnea    Diet:  Other    Frequency of  exercise:  None    Taking medications regularly:  Yes    Medication side effects:  Not applicable    PHQ-2 Total Score: 0    Additional concerns today:  Yes       Has Mirena IUD in place, says it was placed 13 years ago.   No menstrual bleeding.   Recent episode of blood in toilet tank and with wiping. Was having pelvic pressure as well.   Did an e-visit and was treated for UTI. Notes macrobid not resolving her symptoms.     Has PAF, following with Cardiology. Taking xarelto. On atenolol but she was taking this prior to diagnosis of PAF.     Also on maxzide for HTN, no side effects.         Review of Systems   Constitutional: Negative for chills and fever.   HENT: Negative for congestion, ear pain, hearing loss and sore throat.    Eyes: Negative for pain and visual disturbance.   Respiratory: Negative for cough and shortness of breath.    Cardiovascular: Negative for chest pain, palpitations and peripheral edema.   Gastrointestinal: Negative for abdominal pain, constipation, diarrhea, heartburn, hematochezia and nausea.   Breasts:  Negative for tenderness, breast mass and discharge.   Genitourinary: Positive for hematuria. Negative for dysuria, frequency, genital sores, pelvic pain, urgency, vaginal bleeding and vaginal discharge.   Musculoskeletal: Negative for arthralgias, joint swelling and myalgias.   Skin: Negative for rash.   Neurological: Negative for dizziness, weakness, headaches and paresthesias.   Psychiatric/Behavioral: Negative for mood changes. The patient is not nervous/anxious.           Objective    /88   Pulse 106   Temp 98.4  F (36.9  C) (Oral)   Resp 18   Wt (!) 177.8 kg (392 lb)   SpO2 96%   BMI 68.35 kg/m    Body mass index is 68.35 kg/m .  Physical Exam   GENERAL: healthy, alert and no distress  EYES: Eyes grossly normal to inspection, PERRL and conjunctivae and sclerae normal  HENT: ear canals and TM's normal, nose and mouth without ulcers or lesions  NECK: no adenopathy, no  asymmetry, masses, or scars and thyroid normal to palpation  RESP: lungs clear to auscultation - no rales, rhonchi or wheezes  BREAST: normal without masses, tenderness or nipple discharge and no palpable axillary masses or adenopathy  CV: regular rate and rhythm, normal S1 S2, no S3 or S4, no murmur, click or rub, no peripheral edema and peripheral pulses strong  ABDOMEN: soft, nontender, no hepatosplenomegaly, no masses and bowel sounds normal   (female): normal female external genitalia, normal urethral meatus, vaginal mucosa pink, moist, well rugated, and normal cervix - IUD strings seen exiting the cervical os, blood in vaginal vault  MS: no gross musculoskeletal defects noted, no edema  SKIN: no suspicious lesions or rashes  NEURO: Normal strength and tone, mentation intact and speech normal  PSYCH: mentation appears normal, affect normal/bright    Procedure: IUD removal  IUD strings gripped with ring forceps. IUD removed intact.

## 2021-12-14 LAB
ALBUMIN SERPL-MCNC: 3.3 G/DL (ref 3.4–5)
ALP SERPL-CCNC: 73 U/L (ref 40–150)
ALT SERPL W P-5'-P-CCNC: 21 U/L (ref 0–50)
ANION GAP SERPL CALCULATED.3IONS-SCNC: 4 MMOL/L (ref 3–14)
AST SERPL W P-5'-P-CCNC: 14 U/L (ref 0–45)
BILIRUB SERPL-MCNC: 0.6 MG/DL (ref 0.2–1.3)
BUN SERPL-MCNC: 15 MG/DL (ref 7–30)
CALCIUM SERPL-MCNC: 9.5 MG/DL (ref 8.5–10.1)
CHLORIDE BLD-SCNC: 99 MMOL/L (ref 94–109)
CO2 SERPL-SCNC: 29 MMOL/L (ref 20–32)
CREAT SERPL-MCNC: 0.8 MG/DL (ref 0.52–1.04)
GFR SERPL CREATININE-BSD FRML MDRD: 85 ML/MIN/1.73M2
GLUCOSE BLD-MCNC: 98 MG/DL (ref 70–99)
POTASSIUM BLD-SCNC: 4.1 MMOL/L (ref 3.4–5.3)
PROT SERPL-MCNC: 8.4 G/DL (ref 6.8–8.8)
SODIUM SERPL-SCNC: 132 MMOL/L (ref 133–144)
T4 FREE SERPL-MCNC: 1.4 NG/DL (ref 0.76–1.46)
TSH SERPL DL<=0.005 MIU/L-ACNC: 4.74 MU/L (ref 0.4–4)

## 2021-12-16 LAB
BKR LAB AP GYN ADEQUACY: ABNORMAL
BKR LAB AP GYN INTERPRETATION: ABNORMAL
BKR LAB AP GYN OTHER FINDINGS: ABNORMAL
BKR LAB AP HPV REFLEX: ABNORMAL
BKR LAB AP PREVIOUS ABNORMAL: ABNORMAL
PATH REPORT.COMMENTS IMP SPEC: ABNORMAL
PATH REPORT.COMMENTS IMP SPEC: ABNORMAL
PATH REPORT.RELEVANT HX SPEC: ABNORMAL

## 2021-12-17 LAB
HUMAN PAPILLOMA VIRUS 16 DNA: NEGATIVE
HUMAN PAPILLOMA VIRUS 18 DNA: NEGATIVE
HUMAN PAPILLOMA VIRUS FINAL DIAGNOSIS: NORMAL
HUMAN PAPILLOMA VIRUS OTHER HR: NEGATIVE

## 2021-12-18 LAB
CHOLEST SERPL-MCNC: 141 MG/DL
FASTING STATUS PATIENT QL REPORTED: ABNORMAL
HDLC SERPL-MCNC: 44 MG/DL
LDLC SERPL CALC-MCNC: 74 MG/DL
NONHDLC SERPL-MCNC: 97 MG/DL
TRIGL SERPL-MCNC: 115 MG/DL

## 2022-01-12 ENCOUNTER — MYC MEDICAL ADVICE (OUTPATIENT)
Dept: FAMILY MEDICINE | Facility: CLINIC | Age: 52
End: 2022-01-12
Payer: COMMERCIAL

## 2022-01-12 DIAGNOSIS — E89.0 POSTABLATIVE HYPOTHYROIDISM: Primary | ICD-10-CM

## 2022-01-12 NOTE — TELEPHONE ENCOUNTER
Patient had OV w/ J.H. 12/13/21. Last result note indicates: Your thyroid hormone from the brain was a bit high but your actual thyroid hormone from your thyroid gland is in normal range. This could be a sign of future kidney dysfunction or could just be transient. We should check your thyroid levels again next year.     Rx for levothyroxine (SYNTHROID) 200 MCG tablet is listed in chart as historical, reported by patient.     Please review and advise on refill request.     Ramesh OSWALD RN

## 2022-01-13 RX ORDER — LEVOTHYROXINE SODIUM 200 UG/1
200 TABLET ORAL DAILY
Qty: 90 TABLET | Refills: 1 | Status: SHIPPED | OUTPATIENT
Start: 2022-01-13 | End: 2022-11-15

## 2022-02-01 DIAGNOSIS — I48.19 PERSISTENT ATRIAL FIBRILLATION (H): Primary | ICD-10-CM

## 2022-02-03 ENCOUNTER — E-VISIT (OUTPATIENT)
Dept: FAMILY MEDICINE | Facility: CLINIC | Age: 52
End: 2022-02-03
Payer: COMMERCIAL

## 2022-02-03 DIAGNOSIS — F41.9 ANXIETY: Primary | ICD-10-CM

## 2022-02-03 PROCEDURE — 99207 PR NON-BILLABLE SERV PER CHARTING: CPT | Performed by: FAMILY MEDICINE

## 2022-02-03 ASSESSMENT — PATIENT HEALTH QUESTIONNAIRE - PHQ9
SUM OF ALL RESPONSES TO PHQ QUESTIONS 1-9: 3
SUM OF ALL RESPONSES TO PHQ QUESTIONS 1-9: 3
10. IF YOU CHECKED OFF ANY PROBLEMS, HOW DIFFICULT HAVE THESE PROBLEMS MADE IT FOR YOU TO DO YOUR WORK, TAKE CARE OF THINGS AT HOME, OR GET ALONG WITH OTHER PEOPLE: NOT DIFFICULT AT ALL

## 2022-02-03 ASSESSMENT — ANXIETY QUESTIONNAIRES
8. IF YOU CHECKED OFF ANY PROBLEMS, HOW DIFFICULT HAVE THESE MADE IT FOR YOU TO DO YOUR WORK, TAKE CARE OF THINGS AT HOME, OR GET ALONG WITH OTHER PEOPLE?: NOT DIFFICULT AT ALL
4. TROUBLE RELAXING: NOT AT ALL
7. FEELING AFRAID AS IF SOMETHING AWFUL MIGHT HAPPEN: NOT AT ALL
GAD7 TOTAL SCORE: 3
6. BECOMING EASILY ANNOYED OR IRRITABLE: NOT AT ALL
3. WORRYING TOO MUCH ABOUT DIFFERENT THINGS: SEVERAL DAYS
GAD7 TOTAL SCORE: 3
2. NOT BEING ABLE TO STOP OR CONTROL WORRYING: SEVERAL DAYS
GAD7 TOTAL SCORE: 3
1. FEELING NERVOUS, ANXIOUS, OR ON EDGE: SEVERAL DAYS
7. FEELING AFRAID AS IF SOMETHING AWFUL MIGHT HAPPEN: NOT AT ALL
5. BEING SO RESTLESS THAT IT IS HARD TO SIT STILL: NOT AT ALL

## 2022-02-03 NOTE — TELEPHONE ENCOUNTER
Provider E-Visit time total (minutes):     Makenzie will recommend virtual or face to face visit in clinic .

## 2022-02-04 ASSESSMENT — ANXIETY QUESTIONNAIRES: GAD7 TOTAL SCORE: 3

## 2022-02-04 ASSESSMENT — PATIENT HEALTH QUESTIONNAIRE - PHQ9: SUM OF ALL RESPONSES TO PHQ QUESTIONS 1-9: 3

## 2022-02-09 ENCOUNTER — OFFICE VISIT (OUTPATIENT)
Dept: CARDIOLOGY | Facility: CLINIC | Age: 52
End: 2022-02-09
Attending: INTERNAL MEDICINE
Payer: COMMERCIAL

## 2022-02-09 VITALS
DIASTOLIC BLOOD PRESSURE: 78 MMHG | HEIGHT: 64 IN | BODY MASS INDEX: 68.35 KG/M2 | SYSTOLIC BLOOD PRESSURE: 132 MMHG | HEART RATE: 84 BPM

## 2022-02-09 DIAGNOSIS — I10 BENIGN ESSENTIAL HYPERTENSION: ICD-10-CM

## 2022-02-09 DIAGNOSIS — I48.19 PERSISTENT ATRIAL FIBRILLATION (H): ICD-10-CM

## 2022-02-09 PROCEDURE — 93000 ELECTROCARDIOGRAM COMPLETE: CPT | Performed by: INTERNAL MEDICINE

## 2022-02-09 PROCEDURE — 99214 OFFICE O/P EST MOD 30 MIN: CPT | Performed by: INTERNAL MEDICINE

## 2022-02-09 NOTE — PROGRESS NOTES
"Electrophysiology/ Clinic Note         H&P and Plan:     REASON FOR VISIT: Electrophysiology evaluation.      HISTORY OF PRESENT ILLNESS: Ms. Blanco is a pleasant 51-year-old lady with a history of hypertension, morbid obesity, obstructive sleep apnea and persistent AFib, who is here for evaluation.      Patient has a long history of paroxysmal A. fib/flutter, which became persistent in 2020.  At that time, she was asymptomatic and declined cardioversion.  She has been on rate control strategy with atenolol and anticoagulation with Xarelto    Today, she informs she is doing well.  She continues to be symptomatic with A. fib and denies any symptoms such as chest pain, shortness of breath, palpitations, lightheadedness, near-syncope or syncope.      EKG today shows A. fib with good heart rate control.    PREVIOUS STUDIES (personally reviewed):  - Echocardiogram (07/15/2020): Normal LV function.  EF estimated 60-65%.  No significant valve disease.    -Zio patch monitor (05/27/2020): Persistent atrial fibrillation.  Average heart rate of 78 bpm.     ASSESSMENT AND PLAN:   1.   Persistent atrial fibrillation.    Heart rate is well controlled and she is asymptomatic.  We will continue current medical therapy.  We will repeat an echo next year.    2.   Embolic prevention.  Chads vas score of 2.    Continue anticoagulation with Xarelto indefinitely.  3. Hypertension.  Blood pressure seems to be well controlled.        Az Johnson MD    Physical Exam:  Vitals: /78   Pulse 84   Ht 1.613 m (5' 3.5\")   BMI 68.35 kg/m      Constitutional:  AAO x3.  Pt is in NAD.  HEAD: normocephalic.  SKIN: Skin normal color, texture and turgor with no lesions or eruptions.  Eyes: PERRL, EOMI.  ENT:  Supple, normal JVP. No lymphadenopathy or thyroid enlargement.  Chest:  CTAB.  Cardiac:  IIR, variable sound of S1 and Normal S2. No murmurs rubs or gallop.    Abdomen:  Normal BS.  Soft, non-tender and non-distended.  No rebound or " guarding.    Extremities:  Pedious pulses palpable B/L.  No LE edema noticed.   Neurological: Strength and sensation grossly symmetric and intact throughout.       CURRENT MEDICATIONS:  Current Outpatient Medications   Medication Sig Dispense Refill     atenolol (TENORMIN) 50 MG tablet Take 1 tablet (50 mg) by mouth daily 90 tablet 3     levothyroxine (SYNTHROID) 200 MCG tablet Take 1 tablet (200 mcg) by mouth daily 90 tablet 1     rivaroxaban ANTICOAGULANT (XARELTO ANTICOAGULANT) 20 MG TABS tablet Take 1 tablet (20 mg) by mouth daily (with dinner) 90 tablet 0     triamterene-HCTZ (MAXZIDE-25) 37.5-25 MG tablet Take 1 tablet by mouth daily 90 tablet 3       ALLERGIES   No Known Allergies    PAST MEDICAL HISTORY:  Past Medical History:   Diagnosis Date     Abdominal pain 12/11/2017     Adjustment disorder with mixed anxiety and depressed mood 5/6/2014     Anxiety      Arrhythmia     afib     Cardiomyopathy (H)      Chronic ear infection      Gastroesophageal reflux disease      HTN (hypertension)      Hypothyroid      LVH (left ventricular hypertrophy)      Morbid obesity (H)      MILTON (obstructive sleep apnea)     CPAP     Palpitations      Typical atrial flutter (H) 5/24/2016       PAST SURGICAL HISTORY:  Past Surgical History:   Procedure Laterality Date     dental extractions      wisdom teeth     GYN SURGERY      D&C     LAPAROSCOPIC CHOLECYSTECTOMY N/A 12/13/2017    Procedure: LAPAROSCOPIC CHOLECYSTECTOMY;  LAPAROSCOPIC CHOLECYSTECTOMY ;  Surgeon: Kvng Davidson MD;  Location:  OR       FAMILY HISTORY:  Family History   Problem Relation Age of Onset     Hypertension Mother        SOCIAL HISTORY:  Social History     Socioeconomic History     Marital status:      Spouse name: Not on file     Number of children: Not on file     Years of education: Not on file     Highest education level: Not on file   Occupational History     Not on file   Tobacco Use     Smoking status: Never Smoker     Smokeless  tobacco: Never Used   Substance and Sexual Activity     Alcohol use: Yes     Alcohol/week: 0.0 standard drinks     Comment: occasional      Drug use: No     Sexual activity: Not on file   Other Topics Concern      Service Not Asked     Blood Transfusions Not Asked     Caffeine Concern Yes     Comment: 1 cups of coffee a day      Occupational Exposure Not Asked     Hobby Hazards Not Asked     Sleep Concern Not Asked     Stress Concern Not Asked     Weight Concern Not Asked     Special Diet No     Back Care Not Asked     Exercise No     Bike Helmet Not Asked     Seat Belt Not Asked     Self-Exams Not Asked     Parent/sibling w/ CABG, MI or angioplasty before 65F 55M? Not Asked   Social History Narrative     Not on file     Social Determinants of Health     Financial Resource Strain: Low Risk      Difficulty of Paying Living Expenses: Not hard at all   Food Insecurity: No Food Insecurity     Worried About Running Out of Food in the Last Year: Never true     Ran Out of Food in the Last Year: Never true   Transportation Needs: No Transportation Needs     Lack of Transportation (Medical): No     Lack of Transportation (Non-Medical): No   Physical Activity: Inactive     Days of Exercise per Week: 0 days     Minutes of Exercise per Session: 0 min   Stress: No Stress Concern Present     Feeling of Stress : Only a little   Social Connections: Moderately Integrated     Frequency of Communication with Friends and Family: More than three times a week     Frequency of Social Gatherings with Friends and Family: More than three times a week     Attends Evangelical Services: More than 4 times per year     Active Member of Clubs or Organizations: No     Attends Club or Organization Meetings: Not on file     Marital Status:    Intimate Partner Violence: Not on file   Housing Stability: Low Risk      Unable to Pay for Housing in the Last Year: No     Number of Places Lived in the Last Year: 1     Unstable Housing in the  Last Year: No       Review of Systems:  Skin:        Eyes:       ENT:       Respiratory:       Cardiovascular:       Gastroenterology:      Genitourinary:       Musculoskeletal:       Neurologic:       Psychiatric:       Heme/Lymph/Imm:       Endocrine:           Recent Lab Results:  LIPID RESULTS:  Lab Results   Component Value Date    CHOL 141 12/13/2021    CHOL 160 03/30/2015    HDL 44 (L) 12/13/2021    HDL 52 03/30/2015    LDL 74 12/13/2021    LDL 88 03/30/2015    TRIG 115 12/13/2021    TRIG 100 03/30/2015       LIVER ENZYME RESULTS:  Lab Results   Component Value Date    AST 14 12/13/2021    AST 20 01/11/2018    ALT 21 12/13/2021    ALT 25 01/11/2018       CBC RESULTS:  Lab Results   Component Value Date    WBC 12.2 (H) 12/11/2017    RBC 4.90 12/11/2017    HGB 13.3 07/15/2020    HCT 39.7 12/11/2017    MCV 81 12/11/2017    MCH 26.5 12/11/2017    MCHC 32.7 12/11/2017    RDW 14.3 12/11/2017     12/11/2017       BMP RESULTS:  Lab Results   Component Value Date     (L) 12/13/2021     07/15/2020    POTASSIUM 4.1 12/13/2021    POTASSIUM 4.0 07/15/2020    CHLORIDE 99 12/13/2021    CHLORIDE 103 07/15/2020    CO2 29 12/13/2021    CO2 28 07/15/2020    ANIONGAP 4 12/13/2021    ANIONGAP 6 07/15/2020    GLC 98 12/13/2021    GLC 98 07/15/2020    BUN 15 12/13/2021    BUN 13 07/15/2020    CR 0.80 12/13/2021    CR 0.76 07/15/2020    GFRESTIMATED 85 12/13/2021    GFRESTIMATED >90 07/15/2020    GFRESTBLACK >90 07/15/2020    GERMAN 9.5 12/13/2021    GERMAN 8.9 07/15/2020        A1C RESULTS:  Lab Results   Component Value Date    A1C 5.5 12/13/2021       INR RESULTS:  Lab Results   Component Value Date    INR 1.29 (H) 12/11/2017         ECHOCARDIOGRAM  No results found for this or any previous visit (from the past 8760 hour(s)).      No orders of the defined types were placed in this encounter.    No orders of the defined types were placed in this encounter.    There are no discontinued medications.      Encounter  Diagnoses   Name Primary?     Benign essential hypertension      Persistent atrial fibrillation (H)          CC  Az Johnson MD  [address]  [place] [address]

## 2022-02-09 NOTE — LETTER
"2/9/2022    Ariana Padilla MD  [address]  [facility] [address]    RE: Makenzie Blanco       Dear Colleague,     I had the pleasure of seeing Makenzie Blanco in the [facility].  Electrophysiology/ Clinic Note         H&P and Plan:     REASON FOR VISIT: Electrophysiology evaluation.      HISTORY OF PRESENT ILLNESS: Ms. Blanco is a pleasant 51-year-old lady with a history of hypertension, morbid obesity, obstructive sleep apnea and persistent AFib, who is here for evaluation.      Patient has a long history of paroxysmal A. fib/flutter, which became persistent in 2020.  At that time, she was asymptomatic and declined cardioversion.  She has been on rate control strategy with atenolol and anticoagulation with Xarelto    Today, she informs she is doing well.  She continues to be symptomatic with A. fib and denies any symptoms such as chest pain, shortness of breath, palpitations, lightheadedness, near-syncope or syncope.      EKG today shows A. fib with good heart rate control.    PREVIOUS STUDIES (personally reviewed):  - Echocardiogram (07/15/2020): Normal LV function.  EF estimated 60-65%.  No significant valve disease.    -Zio patch monitor (05/27/2020): Persistent atrial fibrillation.  Average heart rate of 78 bpm.     ASSESSMENT AND PLAN:   1.   Persistent atrial fibrillation.    Heart rate is well controlled and she is asymptomatic.  We will continue current medical therapy.  We will repeat an echo next year.    2.   Embolic prevention.  Chads vas score of 2.    Continue anticoagulation with Xarelto indefinitely.  3. Hypertension.  Blood pressure seems to be well controlled.        Az Johnson MD    Physical Exam:  Vitals: /78   Pulse 84   Ht 1.613 m (5' 3.5\")   BMI 68.35 kg/m      Constitutional:  AAO x3.  Pt is in NAD.  HEAD: normocephalic.  SKIN: Skin normal color, texture and turgor with no lesions or eruptions.  Eyes: PERRL, EOMI.  ENT:  Supple, normal JVP. No " lymphadenopathy or thyroid enlargement.  Chest:  CTAB.  Cardiac:  IIR, variable sound of S1 and Normal S2. No murmurs rubs or gallop.    Abdomen:  Normal BS.  Soft, non-tender and non-distended.  No rebound or guarding.    Extremities:  Pedious pulses palpable B/L.  No LE edema noticed.   Neurological: Strength and sensation grossly symmetric and intact throughout.       CURRENT MEDICATIONS:  Current Outpatient Medications   Medication Sig Dispense Refill     atenolol (TENORMIN) 50 MG tablet Take 1 tablet (50 mg) by mouth daily 90 tablet 3     levothyroxine (SYNTHROID) 200 MCG tablet Take 1 tablet (200 mcg) by mouth daily 90 tablet 1     rivaroxaban ANTICOAGULANT (XARELTO ANTICOAGULANT) 20 MG TABS tablet Take 1 tablet (20 mg) by mouth daily (with dinner) 90 tablet 0     triamterene-HCTZ (MAXZIDE-25) 37.5-25 MG tablet Take 1 tablet by mouth daily 90 tablet 3       ALLERGIES   No Known Allergies    PAST MEDICAL HISTORY:  Past Medical History:   Diagnosis Date     Abdominal pain 12/11/2017     Adjustment disorder with mixed anxiety and depressed mood 5/6/2014     Anxiety      Arrhythmia     afib     Cardiomyopathy (H)      Chronic ear infection      Gastroesophageal reflux disease      HTN (hypertension)      Hypothyroid      LVH (left ventricular hypertrophy)      Morbid obesity (H)      MILTON (obstructive sleep apnea)     CPAP     Palpitations      Typical atrial flutter (H) 5/24/2016       PAST SURGICAL HISTORY:  Past Surgical History:   Procedure Laterality Date     dental extractions      wisdom teeth     GYN SURGERY      D&C     LAPAROSCOPIC CHOLECYSTECTOMY N/A 12/13/2017    Procedure: LAPAROSCOPIC CHOLECYSTECTOMY;  LAPAROSCOPIC CHOLECYSTECTOMY ;  Surgeon: Kvng Davidson MD;  Location: [facility] OR       FAMILY HISTORY:  Family History   Problem Relation Age of Onset     Hypertension Mother        SOCIAL HISTORY:  Social History     Socioeconomic History     Marital status:      Spouse name: Not on file      Number of children: Not on file     Years of education: Not on file     Highest education level: Not on file   Occupational History     Not on file   Tobacco Use     Smoking status: Never Smoker     Smokeless tobacco: Never Used   Substance and Sexual Activity     Alcohol use: Yes     Alcohol/week: 0.0 standard drinks     Comment: occasional      Drug use: No     Sexual activity: Not on file   Other Topics Concern      Service Not Asked     Blood Transfusions Not Asked     Caffeine Concern Yes     Comment: 1 cups of coffee a day      Occupational Exposure Not Asked     Hobby Hazards Not Asked     Sleep Concern Not Asked     Stress Concern Not Asked     Weight Concern Not Asked     Special Diet No     Back Care Not Asked     Exercise No     Bike Helmet Not Asked     Seat Belt Not Asked     Self-Exams Not Asked     Parent/sibling w/ CABG, MI or angioplasty before 65F 55M? Not Asked   Social History Narrative     Not on file     Social Determinants of Health     Financial Resource Strain: Low Risk      Difficulty of Paying Living Expenses: Not hard at all   Food Insecurity: No Food Insecurity     Worried About Running Out of Food in the Last Year: Never true     Ran Out of Food in the Last Year: Never true   Transportation Needs: No Transportation Needs     Lack of Transportation (Medical): No     Lack of Transportation (Non-Medical): No   Physical Activity: Inactive     Days of Exercise per Week: 0 days     Minutes of Exercise per Session: 0 min   Stress: No Stress Concern Present     Feeling of Stress : Only a little   Social Connections: Moderately Integrated     Frequency of Communication with Friends and Family: More than three times a week     Frequency of Social Gatherings with Friends and Family: More than three times a week     Attends Confucianism Services: More than 4 times per year     Active Member of Clubs or Organizations: No     Attends Club or Organization Meetings: Not on file     Marital  Status:    Intimate Partner Violence: Not on file   Housing Stability: Low Risk      Unable to Pay for Housing in the Last Year: No     Number of Places Lived in the Last Year: 1     Unstable Housing in the Last Year: No       Review of Systems:  Skin:        Eyes:       ENT:       Respiratory:       Cardiovascular:       Gastroenterology:      Genitourinary:       Musculoskeletal:       Neurologic:       Psychiatric:       Heme/Lymph/Imm:       Endocrine:           Recent Lab Results:  LIPID RESULTS:  Lab Results   Component Value Date    CHOL 141 12/13/2021    CHOL 160 03/30/2015    HDL 44 (L) 12/13/2021    HDL 52 03/30/2015    LDL 74 12/13/2021    LDL 88 03/30/2015    TRIG 115 12/13/2021    TRIG 100 03/30/2015       LIVER ENZYME RESULTS:  Lab Results   Component Value Date    AST 14 12/13/2021    AST 20 01/11/2018    ALT 21 12/13/2021    ALT 25 01/11/2018       CBC RESULTS:  Lab Results   Component Value Date    WBC 12.2 (H) 12/11/2017    RBC 4.90 12/11/2017    HGB 13.3 07/15/2020    HCT 39.7 12/11/2017    MCV 81 12/11/2017    MCH 26.5 12/11/2017    MCHC 32.7 12/11/2017    RDW 14.3 12/11/2017     12/11/2017       BMP RESULTS:  Lab Results   Component Value Date     (L) 12/13/2021     07/15/2020    POTASSIUM 4.1 12/13/2021    POTASSIUM 4.0 07/15/2020    CHLORIDE 99 12/13/2021    CHLORIDE 103 07/15/2020    CO2 29 12/13/2021    CO2 28 07/15/2020    ANIONGAP 4 12/13/2021    ANIONGAP 6 07/15/2020    GLC 98 12/13/2021    GLC 98 07/15/2020    BUN 15 12/13/2021    BUN 13 07/15/2020    CR 0.80 12/13/2021    CR 0.76 07/15/2020    GFRESTIMATED 85 12/13/2021    GFRESTIMATED >90 07/15/2020    GFRESTBLACK >90 07/15/2020    GERMAN 9.5 12/13/2021    GERMAN 8.9 07/15/2020        A1C RESULTS:  Lab Results   Component Value Date    A1C 5.5 12/13/2021       INR RESULTS:  Lab Results   Component Value Date    INR 1.29 (H) 12/11/2017       ECHOCARDIOGRAM  No results found for this or any previous visit (from the  past 8760 hour(s)).    No orders of the defined types were placed in this encounter.    There are no discontinued medications.    Encounter Diagnoses   Name Primary?     Benign essential hypertension      Persistent atrial fibrillation (H)          Az Johnson MD   [facility]

## 2022-04-04 DIAGNOSIS — I48.3 TYPICAL ATRIAL FLUTTER (H): ICD-10-CM

## 2022-04-10 ENCOUNTER — HEALTH MAINTENANCE LETTER (OUTPATIENT)
Age: 52
End: 2022-04-10

## 2022-05-12 ENCOUNTER — E-VISIT (OUTPATIENT)
Dept: FAMILY MEDICINE | Facility: CLINIC | Age: 52
End: 2022-05-12
Payer: COMMERCIAL

## 2022-05-12 DIAGNOSIS — H92.02 OTALGIA, LEFT: Primary | ICD-10-CM

## 2022-05-12 PROCEDURE — 99421 OL DIG E/M SVC 5-10 MIN: CPT | Performed by: FAMILY MEDICINE

## 2022-05-12 NOTE — PATIENT INSTRUCTIONS
Thank you for choosing us for your care. Based on your symptoms and length of illness, I do not think that you need a prescription at this time.  Please follow the care advise I've provided and use the over the counter medications to help relieve your symptoms.   View your full visit summary for details by clicking on the link below.     If you're not feeling better within 2-3 days, please respond to this message and we can consider if a prescription is needed.  You can schedule an appointment right here in [facility], or call [phone]  If the visit is for the same symptoms as your eVisit, we'll refund the cost of your eVisit if seen within seven days.

## 2022-10-15 ENCOUNTER — HEALTH MAINTENANCE LETTER (OUTPATIENT)
Age: 52
End: 2022-10-15

## 2022-11-14 DIAGNOSIS — E89.0 POSTABLATIVE HYPOTHYROIDISM: ICD-10-CM

## 2022-11-14 NOTE — TELEPHONE ENCOUNTER
Routing refill request to provider for review/approval because:  Labs out of range:    Recent Labs   Lab Test 12/13/21  1407   TSH 4.74*     Possible break in medication.      Kaley Del Valle R.N.

## 2022-11-15 RX ORDER — LEVOTHYROXINE SODIUM 200 UG/1
200 TABLET ORAL DAILY
Qty: 90 TABLET | Refills: 0 | Status: SHIPPED | OUTPATIENT
Start: 2022-11-15 | End: 2023-02-13

## 2022-11-17 ASSESSMENT — ANXIETY QUESTIONNAIRES
3. WORRYING TOO MUCH ABOUT DIFFERENT THINGS: SEVERAL DAYS
IF YOU CHECKED OFF ANY PROBLEMS ON THIS QUESTIONNAIRE, HOW DIFFICULT HAVE THESE PROBLEMS MADE IT FOR YOU TO DO YOUR WORK, TAKE CARE OF THINGS AT HOME, OR GET ALONG WITH OTHER PEOPLE: NOT DIFFICULT AT ALL
GAD7 TOTAL SCORE: 6
5. BEING SO RESTLESS THAT IT IS HARD TO SIT STILL: NOT AT ALL
2. NOT BEING ABLE TO STOP OR CONTROL WORRYING: SEVERAL DAYS
7. FEELING AFRAID AS IF SOMETHING AWFUL MIGHT HAPPEN: SEVERAL DAYS
GAD7 TOTAL SCORE: 6
7. FEELING AFRAID AS IF SOMETHING AWFUL MIGHT HAPPEN: SEVERAL DAYS
1. FEELING NERVOUS, ANXIOUS, OR ON EDGE: SEVERAL DAYS
8. IF YOU CHECKED OFF ANY PROBLEMS, HOW DIFFICULT HAVE THESE MADE IT FOR YOU TO DO YOUR WORK, TAKE CARE OF THINGS AT HOME, OR GET ALONG WITH OTHER PEOPLE?: NOT DIFFICULT AT ALL
4. TROUBLE RELAXING: SEVERAL DAYS
6. BECOMING EASILY ANNOYED OR IRRITABLE: SEVERAL DAYS
GAD7 TOTAL SCORE: 6

## 2022-11-17 ASSESSMENT — PATIENT HEALTH QUESTIONNAIRE - PHQ9
SUM OF ALL RESPONSES TO PHQ QUESTIONS 1-9: 8
10. IF YOU CHECKED OFF ANY PROBLEMS, HOW DIFFICULT HAVE THESE PROBLEMS MADE IT FOR YOU TO DO YOUR WORK, TAKE CARE OF THINGS AT HOME, OR GET ALONG WITH OTHER PEOPLE: SOMEWHAT DIFFICULT
SUM OF ALL RESPONSES TO PHQ QUESTIONS 1-9: 8

## 2022-11-18 ENCOUNTER — VIRTUAL VISIT (OUTPATIENT)
Dept: FAMILY MEDICINE | Facility: CLINIC | Age: 52
End: 2022-11-18
Payer: COMMERCIAL

## 2022-11-18 DIAGNOSIS — E66.01 MORBID OBESITY (H): ICD-10-CM

## 2022-11-18 DIAGNOSIS — F41.1 GAD (GENERALIZED ANXIETY DISORDER): Primary | ICD-10-CM

## 2022-11-18 DIAGNOSIS — E89.0 POSTABLATIVE HYPOTHYROIDISM: ICD-10-CM

## 2022-11-18 DIAGNOSIS — F51.04 PSYCHOPHYSIOLOGICAL INSOMNIA: ICD-10-CM

## 2022-11-18 DIAGNOSIS — Z12.11 SCREEN FOR COLON CANCER: ICD-10-CM

## 2022-11-18 PROCEDURE — 99213 OFFICE O/P EST LOW 20 MIN: CPT | Mod: GT | Performed by: FAMILY MEDICINE

## 2022-11-18 RX ORDER — TRAZODONE HYDROCHLORIDE 50 MG/1
50-100 TABLET, FILM COATED ORAL AT BEDTIME
Qty: 60 TABLET | Refills: 3 | Status: SHIPPED | OUTPATIENT
Start: 2022-11-18 | End: 2022-12-01

## 2022-11-18 ASSESSMENT — PATIENT HEALTH QUESTIONNAIRE - PHQ9
SUM OF ALL RESPONSES TO PHQ QUESTIONS 1-9: 8
10. IF YOU CHECKED OFF ANY PROBLEMS, HOW DIFFICULT HAVE THESE PROBLEMS MADE IT FOR YOU TO DO YOUR WORK, TAKE CARE OF THINGS AT HOME, OR GET ALONG WITH OTHER PEOPLE: SOMEWHAT DIFFICULT

## 2022-11-18 ASSESSMENT — ANXIETY QUESTIONNAIRES: GAD7 TOTAL SCORE: 6

## 2022-11-18 NOTE — PROGRESS NOTES
Makenzie is a 52 year old who is being evaluated via a billable video visit.      How would you like to obtain your AVS? MyChart  If the video visit is dropped, the invitation should be resent by: Text to cell phone: [phone]  Will anyone else be joining your video visit? No       Assessment & Plan     DANISHA (generalized anxiety disorder) - new issue to me, becoming more of an issue for Corinne overnight particularly. Will treat sleep to start, then can readdress her anxiety if still an issue.     Morbid obesity (H)    Screen for colon cancer  - Colonoscopy Screening  Referral; Future    Psychophysiological insomnia - will treat as below  - traZODone (DESYREL) 50 MG tablet; Take 1-2 tablets ( mg) by mouth At Bedtime    Return in about 6 weeks (around 12/30/2022) for Medication Recheck.    Ariana Padilla MD  [facility]      Kp Banegas is a 52 year old, presenting for the following health issues:  Follow Up    History of Present Illness       Mental Health Follow-up:  Patient presents to follow-up on Depression & Anxiety.Patient's depression since last visit has been:  Medium  The patient is not having other symptoms associated with depression.  Patient's anxiety since last visit has been:  Medium  The patient is not having other symptoms associated with anxiety.  Any significant life events: relationship concerns, job concerns and health concerns  Patient is feeling anxious or having panic attacks.  Patient has no concerns about alcohol or drug use.    She eats 2-3 servings of fruits and vegetables daily.She consumes 0 sweetened beverage(s) daily.She exercises with enough effort to increase her heart rate 9 or less minutes per day.  She exercises with enough effort to increase her heart rate 3 or less days per week.   She is taking medications regularly.    Today's PHQ-9         PHQ-9 Total Score: 8    PHQ-9 Q9 Thoughts of better off dead/self-harm past 2 weeks  ":   Not at all    How difficult have these problems made it for you to do your work, take care of things at home, or get along with other people: Somewhat difficult  Today's DANISHA-7 Score: 6       Reports anxiety and panic attacks for the past 20 years.   Has seen therapists in the past for this.   Was suggested she started medication for this.     Sleeping poorly. Night time awakenings, brain racing.       Review of Systems   Constitutional, HEENT, cardiovascular, pulmonary, GI, , musculoskeletal, neuro, skin, endocrine and psych systems are negative, except as otherwise noted.      Objective    Vitals - Patient Reported  Weight (Patient Reported): 177.8 kg (392 lb)  Height (Patient Reported): 162.6 cm (5' 4\")  BMI (Based on Pt Reported Ht/Wt): 67.29    Physical Exam   GENERAL: Healthy, alert and no distress  EYES: Eyes grossly normal to inspection.  No discharge or erythema, or obvious scleral/conjunctival abnormalities.  RESP: No audible wheeze, cough, or visible cyanosis.  No visible retractions or increased work of breathing.    SKIN: Visible skin clear. No significant rash, abnormal pigmentation or lesions.  NEURO: Cranial nerves grossly intact.  Mentation and speech appropriate for age.  PSYCH: Mentation appears normal, affect normal/bright, judgement and insight intact, normal speech and appearance well-groomed.    DANISHA-7 SCORE 5/6/2014 2/3/2022 11/17/2022   Total Score 1 - -   Total Score - 3 (minimal anxiety) 6 (mild anxiety)   Total Score - 3 6       PHQ 2/3/2022 11/17/2022   PHQ-9 Total Score 3 8   Q9: Thoughts of better off dead/self-harm past 2 weeks Not at all Not at all           Video-Visit Details    Video Start Time: 1:35 PM    Type of service:  Video Visit    Video End Time:1:52 PM    Originating Location (pt. Location): Home    Distant Location (provider location):  Off-site    Platform used for Video Visit: Colby"

## 2022-11-21 ENCOUNTER — TELEPHONE (OUTPATIENT)
Dept: FAMILY MEDICINE | Facility: CLINIC | Age: 52
End: 2022-11-21

## 2022-11-21 NOTE — TELEPHONE ENCOUNTER
Patient Quality Outreach    Patient is due for the following:   Breast Cancer Screening - Mammogram    Next Steps:   advise mammogram due    Type of outreach:    Sent [employer] message.    Next Steps:  Reach out within 90 days via Phone.    Max number of attempts reached: No. Will try again in 90 days if patient still on fail list.    Questions for provider review:    None     Raffi Sherman CMA  Chart routed to Care Team.

## 2022-11-28 ENCOUNTER — MYC MEDICAL ADVICE (OUTPATIENT)
Dept: FAMILY MEDICINE | Facility: CLINIC | Age: 52
End: 2022-11-28

## 2022-12-01 NOTE — TELEPHONE ENCOUNTER
Looks like we need to discuss daily anxiety medication. Virtual visit suggested.     Tylenol pm okay

## 2022-12-05 ENCOUNTER — VIRTUAL VISIT (OUTPATIENT)
Dept: FAMILY MEDICINE | Facility: CLINIC | Age: 52
End: 2022-12-05
Payer: COMMERCIAL

## 2022-12-05 DIAGNOSIS — I10 BENIGN ESSENTIAL HYPERTENSION: ICD-10-CM

## 2022-12-05 DIAGNOSIS — F41.1 GAD (GENERALIZED ANXIETY DISORDER): Primary | ICD-10-CM

## 2022-12-05 DIAGNOSIS — Z12.11 SCREEN FOR COLON CANCER: ICD-10-CM

## 2022-12-05 DIAGNOSIS — Z12.31 ENCOUNTER FOR SCREENING MAMMOGRAM FOR BREAST CANCER: ICD-10-CM

## 2022-12-05 PROCEDURE — 99213 OFFICE O/P EST LOW 20 MIN: CPT | Mod: GT | Performed by: FAMILY MEDICINE

## 2022-12-05 RX ORDER — TRIAMTERENE/HYDROCHLOROTHIAZID 37.5-25 MG
1 TABLET ORAL DAILY
Qty: 90 TABLET | Refills: 3 | Status: SHIPPED | OUTPATIENT
Start: 2022-12-05 | End: 2023-12-09

## 2022-12-05 RX ORDER — ESCITALOPRAM OXALATE 5 MG/1
5 TABLET ORAL DAILY
Qty: 90 TABLET | Refills: 3 | Status: SHIPPED | OUTPATIENT
Start: 2022-12-05 | End: 2023-11-22

## 2022-12-05 RX ORDER — ATENOLOL 50 MG/1
50 TABLET ORAL DAILY
Qty: 90 TABLET | Refills: 3 | Status: CANCELLED | OUTPATIENT
Start: 2022-12-05

## 2022-12-05 RX ORDER — ATENOLOL 50 MG/1
50 TABLET ORAL DAILY
Qty: 90 TABLET | Refills: 3 | Status: SHIPPED | OUTPATIENT
Start: 2022-12-05 | End: 2023-11-22

## 2022-12-05 ASSESSMENT — ANXIETY QUESTIONNAIRES
8. IF YOU CHECKED OFF ANY PROBLEMS, HOW DIFFICULT HAVE THESE MADE IT FOR YOU TO DO YOUR WORK, TAKE CARE OF THINGS AT HOME, OR GET ALONG WITH OTHER PEOPLE?: NOT DIFFICULT AT ALL
GAD7 TOTAL SCORE: 5
GAD7 TOTAL SCORE: 5
7. FEELING AFRAID AS IF SOMETHING AWFUL MIGHT HAPPEN: NOT AT ALL
4. TROUBLE RELAXING: SEVERAL DAYS
7. FEELING AFRAID AS IF SOMETHING AWFUL MIGHT HAPPEN: NOT AT ALL
1. FEELING NERVOUS, ANXIOUS, OR ON EDGE: MORE THAN HALF THE DAYS
GAD7 TOTAL SCORE: 5
2. NOT BEING ABLE TO STOP OR CONTROL WORRYING: SEVERAL DAYS
3. WORRYING TOO MUCH ABOUT DIFFERENT THINGS: SEVERAL DAYS
IF YOU CHECKED OFF ANY PROBLEMS ON THIS QUESTIONNAIRE, HOW DIFFICULT HAVE THESE PROBLEMS MADE IT FOR YOU TO DO YOUR WORK, TAKE CARE OF THINGS AT HOME, OR GET ALONG WITH OTHER PEOPLE: NOT DIFFICULT AT ALL
5. BEING SO RESTLESS THAT IT IS HARD TO SIT STILL: NOT AT ALL
6. BECOMING EASILY ANNOYED OR IRRITABLE: NOT AT ALL

## 2022-12-05 ASSESSMENT — PATIENT HEALTH QUESTIONNAIRE - PHQ9
SUM OF ALL RESPONSES TO PHQ QUESTIONS 1-9: 4
SUM OF ALL RESPONSES TO PHQ QUESTIONS 1-9: 4
10. IF YOU CHECKED OFF ANY PROBLEMS, HOW DIFFICULT HAVE THESE PROBLEMS MADE IT FOR YOU TO DO YOUR WORK, TAKE CARE OF THINGS AT HOME, OR GET ALONG WITH OTHER PEOPLE: NOT DIFFICULT AT ALL

## 2022-12-05 NOTE — PROGRESS NOTES
Makenzie is a 52 year old who is being evaluated via a billable video visit.      How would you like to obtain your AVS? Xolve  If the video visit is dropped, the invitation should be resent by: Text to cell phone: [phone]  Will anyone else be joining your video visit? No        Assessment & Plan     DANISHA (generalized anxiety disorder) - will add lexapro to help with daytime anxiety. Encouraged her to check in in about 4-6 weeks through [employer].   - escitalopram (LEXAPRO) 5 MG tablet; Take 1 tablet (5 mg) by mouth daily    Benign essential hypertension - home BP in range, continue current, surveillance labs near future  - triamterene-HCTZ (MAXZIDE-25) 37.5-25 MG tablet; Take 1 tablet by mouth daily  - Basic metabolic panel  (Ca, Cl, CO2, Creat, Gluc, K, Na, BUN); Future  - atenolol (TENORMIN) 50 MG tablet; Take 1 tablet (50 mg) by mouth daily    Screen for colon cancer  - COLJANENE(EXACT SCIENCES); Future    Encounter for screening mammogram for breast cancer  - MA Screen Bilateral w/Yvon; Future    Return in about 1 year (around 12/5/2023) for Yearly Preventive Exam.    Ariana Padilla MD  [facility]      Kp Banegas is a 52 year old, presenting for the following health issues:  Anxiety      History of Present Illness       Mental Health Follow-up:  Patient presents to follow-up on Depression & Anxiety.Patient's depression since last visit has been:  Medium  The patient is not having other symptoms associated with depression.  Patient's anxiety since last visit has been:  Medium  The patient is not having other symptoms associated with anxiety.  Any significant life events: No  Patient is feeling anxious or having panic attacks.  Patient has no concerns about alcohol or drug use.    She eats 2-3 servings of fruits and vegetables daily.She consumes 0 sweetened beverage(s) daily.She exercises with enough effort to increase her heart rate 9 or less minutes per day.  She  "exercises with enough effort to increase her heart rate 3 or less days per week.   She is taking medications regularly.    Today's PHQ-9         PHQ-9 Total Score: 4    PHQ-9 Q9 Thoughts of better off dead/self-harm past 2 weeks :   Not at all    How difficult have these problems made it for you to do your work, take care of things at home, or get along with other people: Not difficult at all  Today's DANISHA-7 Score: 5             Review of Systems         Objective    Vitals - Patient Reported  Systolic (Patient Reported): 121  Diastolic (Patient Reported): 81  Blood pressure taken with manual cuff (will exclude from quality measure): Yes  Weight (Patient Reported): 176.9 kg (390 lb)  Height (Patient Reported): 162.6 cm (5' 4\")  BMI (Based on Pt Reported Ht/Wt): 66.94      Vitals:  No vitals were obtained today due to virtual visit.    Physical Exam   GENERAL: Healthy, alert and no distress  EYES: Eyes grossly normal to inspection.  No discharge or erythema, or obvious scleral/conjunctival abnormalities.  RESP: No audible wheeze, cough, or visible cyanosis.  No visible retractions or increased work of breathing.    SKIN: Visible skin clear. No significant rash, abnormal pigmentation or lesions.  NEURO: Cranial nerves grossly intact.  Mentation and speech appropriate for age.  PSYCH: Mentation appears normal, affect normal/bright, judgement and insight intact, normal speech and appearance well-groomed.                Video-Visit Details    Video Start Time: 1:04 PM    Type of service:  Video Visit    Video End Time:1:22 PM    Originating Location (pt. Location): Home        Distant Location (provider location):  On-site    Platform used for Video Visit: Colby"

## 2022-12-09 ENCOUNTER — MYC MEDICAL ADVICE (OUTPATIENT)
Dept: FAMILY MEDICINE | Facility: CLINIC | Age: 52
End: 2022-12-09

## 2022-12-15 NOTE — TELEPHONE ENCOUNTER
Patient is due/failing the following:   MAMMOGRAM    Reviewed:    [x] CARE EVERYWHERE  [x] LAST OV NOTE   [x] FYI TAB  [x] MYCHART ACTIVE?  [x] LAST PANEL ENCOUNTER  [x] FUTURE APPTS  [x] IMMUNIZATIONS  [x] Media Tab  Action needed:   Mammo, colonoscopy    Type of outreach:    Sent [email or web address] message.                                                                               Kristin Way CMA

## 2023-01-27 ENCOUNTER — TELEPHONE (OUTPATIENT)
Dept: CARDIOLOGY | Facility: CLINIC | Age: 53
End: 2023-01-27
Payer: COMMERCIAL

## 2023-01-27 NOTE — TELEPHONE ENCOUNTER
Hi,    I am sending a recall letter to this pt please ext her order to June 2023 as that is Alex first avail in [place] this way when she calls back [facility] can help schedule appts.    Thank you,  Harish

## 2023-02-13 DIAGNOSIS — E89.0 POSTABLATIVE HYPOTHYROIDISM: ICD-10-CM

## 2023-02-13 RX ORDER — LEVOTHYROXINE SODIUM 200 UG/1
200 TABLET ORAL DAILY
Qty: 90 TABLET | Refills: 0 | Status: SHIPPED | OUTPATIENT
Start: 2023-02-13 | End: 2023-02-16

## 2023-02-13 NOTE — TELEPHONE ENCOUNTER
Routing refill request to provider for review/approval because:  TSH   Date Value Ref Range Status   12/13/2021 4.74 (H) 0.40 - 4.00 mU/L Final   07/15/2020 3.09 0.40 - 4.00 mU/L Final     Pt does have lab appt next week    Kristel Lubin RN

## 2023-02-16 DIAGNOSIS — I48.3 TYPICAL ATRIAL FLUTTER (H): ICD-10-CM

## 2023-03-04 ENCOUNTER — LAB (OUTPATIENT)
Dept: LAB | Facility: CLINIC | Age: 53
End: 2023-03-04
Payer: COMMERCIAL

## 2023-03-04 DIAGNOSIS — Z13.220 LIPID SCREENING: ICD-10-CM

## 2023-03-04 DIAGNOSIS — F41.1 GAD (GENERALIZED ANXIETY DISORDER): ICD-10-CM

## 2023-03-04 LAB
CHOLEST SERPL-MCNC: 135 MG/DL
ERYTHROCYTE [DISTWIDTH] IN BLOOD BY AUTOMATED COUNT: 14.4 % (ref 10–15)
FSH SERPL IRP2-ACNC: 6.1 MIU/ML
HCT VFR BLD AUTO: 40.7 % (ref 35–47)
HDLC SERPL-MCNC: 44 MG/DL
HGB BLD-MCNC: 13.1 G/DL (ref 11.7–15.7)
LDLC SERPL CALC-MCNC: 70 MG/DL
MCH RBC QN AUTO: 26.1 PG (ref 26.5–33)
MCHC RBC AUTO-ENTMCNC: 32.2 G/DL (ref 31.5–36.5)
MCV RBC AUTO: 81 FL (ref 78–100)
NONHDLC SERPL-MCNC: 91 MG/DL
PLATELET # BLD AUTO: 380 10E3/UL (ref 150–450)
RBC # BLD AUTO: 5.02 10E6/UL (ref 3.8–5.2)
TRIGL SERPL-MCNC: 107 MG/DL
WBC # BLD AUTO: 8.8 10E3/UL (ref 4–11)

## 2023-03-04 PROCEDURE — 80061 LIPID PANEL: CPT

## 2023-03-04 PROCEDURE — 36415 COLL VENOUS BLD VENIPUNCTURE: CPT

## 2023-03-04 PROCEDURE — 83001 ASSAY OF GONADOTROPIN (FSH): CPT

## 2023-03-04 PROCEDURE — 85027 COMPLETE CBC AUTOMATED: CPT

## 2023-03-26 ENCOUNTER — HEALTH MAINTENANCE LETTER (OUTPATIENT)
Age: 53
End: 2023-03-26

## 2023-04-04 ENCOUNTER — TELEPHONE (OUTPATIENT)
Dept: FAMILY MEDICINE | Facility: CLINIC | Age: 53
End: 2023-04-04
Payer: COMMERCIAL

## 2023-04-04 NOTE — TELEPHONE ENCOUNTER
Patient Quality Outreach    Patient is due for the following:   Breast Cancer Screening - Mammogram  Physical  - Due after lsat 12-13-21    Next Steps:   Schedule a Adult Preventative    Type of outreach:    Sent [employer] message.    Next Steps:  Reach out within 90 days via Phone.    Max number of attempts reached: No. Will try again in 90 days if patient still on fail list.    Questions for provider review:    None     Raffi Sherman CMA  Chart routed to Care Team.

## 2023-05-30 DIAGNOSIS — E89.0 POSTABLATIVE HYPOTHYROIDISM: ICD-10-CM

## 2023-05-30 NOTE — TELEPHONE ENCOUNTER
Routing refill request to provider for review/approval because:  A break in medication  TSH   Date Value Ref Range Status   12/13/2021 4.74 (H) 0.40 - 4.00 mU/L Final   07/15/2020 3.09 0.40 - 4.00 mU/L Final   Pt had lab appt but TSH was not done  Anna Marie EATON RN, BSN

## 2023-06-01 ENCOUNTER — HEALTH MAINTENANCE LETTER (OUTPATIENT)
Age: 53
End: 2023-06-01

## 2023-06-01 RX ORDER — LEVOTHYROXINE SODIUM 200 UG/1
200 TABLET ORAL DAILY
Qty: 30 TABLET | Refills: 0 | Status: SHIPPED | OUTPATIENT
Start: 2023-06-01 | End: 2023-07-20

## 2023-06-01 NOTE — TELEPHONE ENCOUNTER
tsh and t4 were ordered future but for some reason not done when she had her blood work.     Will need thyroid labs near future    Those future orders should still be good.

## 2023-06-13 DIAGNOSIS — I48.3 TYPICAL ATRIAL FLUTTER (H): ICD-10-CM

## 2023-06-13 NOTE — TELEPHONE ENCOUNTER
[facility] Cardiology Refill Guideline reviewed.  Medication meets criteria for refill. Patient is due for an appointment. No appointment is scheduled. 90 day supply and second attempt to notify patient with refill letter.

## 2023-06-23 DIAGNOSIS — E89.0 POSTABLATIVE HYPOTHYROIDISM: ICD-10-CM

## 2023-06-23 NOTE — TELEPHONE ENCOUNTER
Pt is over due for labs   She will schedule lab online and then we can refill RX    If needs RF before lab will call back       Kristel Lubin RN

## 2023-06-28 NOTE — TELEPHONE ENCOUNTER
Reyt sent to see if need help with scheduling as pt has not scheduled lab appointment.    Cassidy MATA RN

## 2023-06-29 RX ORDER — LEVOTHYROXINE SODIUM 200 UG/1
200 TABLET ORAL DAILY
Qty: 30 TABLET | Refills: 0 | OUTPATIENT
Start: 2023-06-29

## 2023-06-29 NOTE — TELEPHONE ENCOUNTER
Routing refill request to provider for review/approval because:  Pt advised to schedule appt x 2 and has not scheduled yet.    TSH   Date Value Ref Range Status   12/13/2021 4.74 (H) 0.40 - 4.00 mU/L Final   07/15/2020 3.09 0.40 - 4.00 mU/L Final     Cassidy MATA RN

## 2023-07-05 ENCOUNTER — TELEPHONE (OUTPATIENT)
Dept: FAMILY MEDICINE | Facility: CLINIC | Age: 53
End: 2023-07-05
Payer: COMMERCIAL

## 2023-07-05 NOTE — TELEPHONE ENCOUNTER
Patient Quality Outreach    Patient is due for the following:   Physical Preventive Adult Physical    Next Steps:   Schedule a Adult Preventative    Type of outreach:    Sent [employer] message.    Next Steps:  Reach out within 90 days via WebKite.    Max number of attempts reached: No. Will try again in 90 days if patient still on fail list.    Questions for provider review:    None           Raffi Sherman CMA  Chart routed to none.       Unknown if ever smoked

## 2023-07-12 NOTE — TELEPHONE ENCOUNTER
Electrophysiology study in progress.  Radha message sent to patient to schedule    Cheryl Riojas/

## 2023-07-18 ENCOUNTER — MYC MEDICAL ADVICE (OUTPATIENT)
Dept: FAMILY MEDICINE | Facility: CLINIC | Age: 53
End: 2023-07-18
Payer: COMMERCIAL

## 2023-07-18 DIAGNOSIS — E89.0 POSTABLATIVE HYPOTHYROIDISM: ICD-10-CM

## 2023-07-19 NOTE — TELEPHONE ENCOUNTER
Routing refill request to provider for review/approval because:  Labs out of range & labs not current:    TSH   Date Value Ref Range Status   12/13/2021 4.74 (H) 0.40 - 4.00 mU/L Final   07/15/2020 3.09 0.40 - 4.00 mU/L Adam MATA RN

## 2023-07-20 RX ORDER — LEVOTHYROXINE SODIUM 200 UG/1
200 TABLET ORAL DAILY
Qty: 30 TABLET | Refills: 0 | Status: SHIPPED | OUTPATIENT
Start: 2023-07-20 | End: 2023-08-14

## 2023-08-14 DIAGNOSIS — E89.0 POSTABLATIVE HYPOTHYROIDISM: ICD-10-CM

## 2023-08-15 RX ORDER — LEVOTHYROXINE SODIUM 200 UG/1
200 TABLET ORAL DAILY
Qty: 15 TABLET | Refills: 0 | Status: SHIPPED | OUTPATIENT
Start: 2023-08-15 | End: 2023-08-18

## 2023-08-15 NOTE — CONFIDENTIAL NOTE
Please call, will give 15 days of levothyroxine, must have labs done, last in 2021.  Thanks,  Susan Haase, CNP

## 2023-08-17 ENCOUNTER — LAB (OUTPATIENT)
Dept: LAB | Facility: CLINIC | Age: 53
End: 2023-08-17
Payer: COMMERCIAL

## 2023-08-17 DIAGNOSIS — E89.0 POSTABLATIVE HYPOTHYROIDISM: ICD-10-CM

## 2023-08-17 DIAGNOSIS — I10 BENIGN ESSENTIAL HYPERTENSION: ICD-10-CM

## 2023-08-17 LAB
ANION GAP SERPL CALCULATED.3IONS-SCNC: 12 MMOL/L (ref 7–15)
BUN SERPL-MCNC: 12.6 MG/DL (ref 6–20)
CALCIUM SERPL-MCNC: 9.5 MG/DL (ref 8.6–10)
CHLORIDE SERPL-SCNC: 101 MMOL/L (ref 98–107)
CREAT SERPL-MCNC: 0.76 MG/DL (ref 0.51–0.95)
DEPRECATED HCO3 PLAS-SCNC: 26 MMOL/L (ref 22–29)
GFR SERPL CREATININE-BSD FRML MDRD: >90 ML/MIN/1.73M2
GLUCOSE SERPL-MCNC: 101 MG/DL (ref 70–99)
POTASSIUM SERPL-SCNC: 4.3 MMOL/L (ref 3.4–5.3)
SODIUM SERPL-SCNC: 139 MMOL/L (ref 136–145)
T4 FREE SERPL-MCNC: 1.28 NG/DL (ref 0.9–1.7)
TSH SERPL DL<=0.005 MIU/L-ACNC: 3.39 UIU/ML (ref 0.3–4.2)

## 2023-08-17 PROCEDURE — 84439 ASSAY OF FREE THYROXINE: CPT

## 2023-08-17 PROCEDURE — 36415 COLL VENOUS BLD VENIPUNCTURE: CPT

## 2023-08-17 PROCEDURE — 80048 BASIC METABOLIC PNL TOTAL CA: CPT

## 2023-08-17 PROCEDURE — 84443 ASSAY THYROID STIM HORMONE: CPT

## 2023-08-18 DIAGNOSIS — E89.0 POSTABLATIVE HYPOTHYROIDISM: ICD-10-CM

## 2023-08-18 RX ORDER — LEVOTHYROXINE SODIUM 200 UG/1
200 TABLET ORAL DAILY
Qty: 90 TABLET | Refills: 3 | Status: SHIPPED | OUTPATIENT
Start: 2023-08-18 | End: 2024-01-18

## 2023-09-21 ENCOUNTER — TELEPHONE (OUTPATIENT)
Dept: CARDIOLOGY | Facility: CLINIC | Age: 53
End: 2023-09-21
Payer: COMMERCIAL

## 2023-09-21 DIAGNOSIS — I48.3 TYPICAL ATRIAL FLUTTER (H): ICD-10-CM

## 2023-09-21 NOTE — TELEPHONE ENCOUNTER
9/21/23 [facility] Refill Guide Reviewed     Received refill request for: Xarelto 20 mg  Last OV was: 2/9/22  Labs/EKG: CBC 3/2023, EKG 2/9/22  F/U scheduled : ordered 9/2023  Rx sent to:  [facility]   Spoke w pt who is at work now and does not have her calendar handy . She requested callback from scheduling to set up annual OV w EKG. Refill for 3 m no refills sent  KHerroRN 305 pm

## 2023-09-22 ENCOUNTER — TELEPHONE (OUTPATIENT)
Dept: CARDIOLOGY | Facility: CLINIC | Age: 53
End: 2023-09-22
Payer: COMMERCIAL

## 2023-09-22 DIAGNOSIS — I48.0 PAROXYSMAL ATRIAL FIBRILLATION (H): Primary | ICD-10-CM

## 2023-11-22 DIAGNOSIS — I10 BENIGN ESSENTIAL HYPERTENSION: ICD-10-CM

## 2023-11-22 DIAGNOSIS — F41.1 GAD (GENERALIZED ANXIETY DISORDER): ICD-10-CM

## 2023-11-22 RX ORDER — ESCITALOPRAM OXALATE 5 MG/1
5 TABLET ORAL DAILY
Qty: 30 TABLET | Refills: 0 | Status: SHIPPED | OUTPATIENT
Start: 2023-11-22 | End: 2024-01-11

## 2023-11-22 RX ORDER — ATENOLOL 50 MG/1
50 TABLET ORAL DAILY
Qty: 30 TABLET | Refills: 0 | Status: SHIPPED | OUTPATIENT
Start: 2023-11-22 | End: 2023-12-21

## 2023-12-21 DIAGNOSIS — I10 BENIGN ESSENTIAL HYPERTENSION: ICD-10-CM

## 2023-12-21 DIAGNOSIS — E89.0 POSTABLATIVE HYPOTHYROIDISM: ICD-10-CM

## 2023-12-21 RX ORDER — LEVOTHYROXINE SODIUM 200 UG/1
200 TABLET ORAL DAILY
Qty: 90 TABLET | Refills: 3 | OUTPATIENT
Start: 2023-12-21

## 2023-12-21 RX ORDER — ATENOLOL 50 MG/1
50 TABLET ORAL DAILY
Qty: 30 TABLET | Refills: 0 | Status: SHIPPED | OUTPATIENT
Start: 2023-12-21 | End: 2024-01-18

## 2023-12-28 DIAGNOSIS — I48.3 TYPICAL ATRIAL FLUTTER (H): ICD-10-CM

## 2023-12-28 NOTE — TELEPHONE ENCOUNTER
Received refill request from [facility] pharmacy for Xarelto 20mg 1 tab daily with supper    Patient has already been informed at the last refill that she needs to make an appointment to get further refills.  Currently, there is no appointment made.   Writer has sent a [email or web address] message to Makenzie, asking her to please get something scheduled.    Informed Makenzie that I sent in a 30 day supply to [facility].    Savanna Wheatley RN on 12/28/2023 at 1:53 PM

## 2024-01-11 ENCOUNTER — MYC MEDICAL ADVICE (OUTPATIENT)
Dept: FAMILY MEDICINE | Facility: CLINIC | Age: 54
End: 2024-01-11
Payer: COMMERCIAL

## 2024-01-11 DIAGNOSIS — F41.1 GAD (GENERALIZED ANXIETY DISORDER): ICD-10-CM

## 2024-01-11 RX ORDER — ESCITALOPRAM OXALATE 5 MG/1
5 TABLET ORAL DAILY
Qty: 30 TABLET | Refills: 0 | Status: SHIPPED | OUTPATIENT
Start: 2024-01-11 | End: 2024-01-18

## 2024-01-18 ENCOUNTER — VIRTUAL VISIT (OUTPATIENT)
Dept: FAMILY MEDICINE | Facility: CLINIC | Age: 54
End: 2024-01-18
Payer: COMMERCIAL

## 2024-01-18 ENCOUNTER — ORDERS ONLY (AUTO-RELEASED) (OUTPATIENT)
Dept: FAMILY MEDICINE | Facility: CLINIC | Age: 54
End: 2024-01-18

## 2024-01-18 DIAGNOSIS — F41.1 GAD (GENERALIZED ANXIETY DISORDER): ICD-10-CM

## 2024-01-18 DIAGNOSIS — Z12.31 VISIT FOR SCREENING MAMMOGRAM: ICD-10-CM

## 2024-01-18 DIAGNOSIS — I10 BENIGN ESSENTIAL HYPERTENSION: Primary | ICD-10-CM

## 2024-01-18 DIAGNOSIS — G47.33 OSA (OBSTRUCTIVE SLEEP APNEA): ICD-10-CM

## 2024-01-18 DIAGNOSIS — I48.0 PAROXYSMAL ATRIAL FIBRILLATION (H): ICD-10-CM

## 2024-01-18 DIAGNOSIS — E89.0 POSTABLATIVE HYPOTHYROIDISM: ICD-10-CM

## 2024-01-18 DIAGNOSIS — E66.01 MORBID OBESITY (H): ICD-10-CM

## 2024-01-18 DIAGNOSIS — Z12.11 SCREEN FOR COLON CANCER: ICD-10-CM

## 2024-01-18 PROCEDURE — 99214 OFFICE O/P EST MOD 30 MIN: CPT | Mod: 95 | Performed by: FAMILY MEDICINE

## 2024-01-18 RX ORDER — ATENOLOL 50 MG/1
50 TABLET ORAL DAILY
Qty: 90 TABLET | Refills: 1 | Status: SHIPPED | OUTPATIENT
Start: 2024-01-18 | End: 2024-07-14

## 2024-01-18 RX ORDER — ESCITALOPRAM OXALATE 5 MG/1
5 TABLET ORAL DAILY
Qty: 90 TABLET | Refills: 1 | Status: SHIPPED | OUTPATIENT
Start: 2024-01-18 | End: 2024-07-12

## 2024-01-18 RX ORDER — LEVOTHYROXINE SODIUM 200 UG/1
200 TABLET ORAL DAILY
Qty: 90 TABLET | Refills: 1 | Status: SHIPPED | OUTPATIENT
Start: 2024-01-18 | End: 2024-07-25

## 2024-01-18 RX ORDER — TRIAMTERENE/HYDROCHLOROTHIAZID 37.5-25 MG
1 TABLET ORAL DAILY
Qty: 90 TABLET | Refills: 1 | Status: SHIPPED | OUTPATIENT
Start: 2024-01-18 | End: 2024-07-15

## 2024-01-18 ASSESSMENT — ANXIETY QUESTIONNAIRES
6. BECOMING EASILY ANNOYED OR IRRITABLE: NOT AT ALL
2. NOT BEING ABLE TO STOP OR CONTROL WORRYING: NOT AT ALL
GAD7 TOTAL SCORE: 0
1. FEELING NERVOUS, ANXIOUS, OR ON EDGE: NOT AT ALL
7. FEELING AFRAID AS IF SOMETHING AWFUL MIGHT HAPPEN: NOT AT ALL
IF YOU CHECKED OFF ANY PROBLEMS ON THIS QUESTIONNAIRE, HOW DIFFICULT HAVE THESE PROBLEMS MADE IT FOR YOU TO DO YOUR WORK, TAKE CARE OF THINGS AT HOME, OR GET ALONG WITH OTHER PEOPLE: NOT DIFFICULT AT ALL
8. IF YOU CHECKED OFF ANY PROBLEMS, HOW DIFFICULT HAVE THESE MADE IT FOR YOU TO DO YOUR WORK, TAKE CARE OF THINGS AT HOME, OR GET ALONG WITH OTHER PEOPLE?: NOT DIFFICULT AT ALL
5. BEING SO RESTLESS THAT IT IS HARD TO SIT STILL: NOT AT ALL
4. TROUBLE RELAXING: NOT AT ALL
GAD7 TOTAL SCORE: 0
3. WORRYING TOO MUCH ABOUT DIFFERENT THINGS: NOT AT ALL
7. FEELING AFRAID AS IF SOMETHING AWFUL MIGHT HAPPEN: NOT AT ALL
GAD7 TOTAL SCORE: 0

## 2024-01-18 NOTE — PROGRESS NOTES
Makenzie is a 53 year old who is being evaluated via a billable video visit.      How would you like to obtain your AVS? MyChart  If the video visit is dropped, the invitation should be resent by: Text to cell phone: [phone]  Will anyone else be joining your video visit? No          Assessment & Plan     Benign essential hypertension - home BP controlled, refills. She will schedule CPE in May/[place] when she is back in town.   - atenolol (TENORMIN) 50 MG tablet; Take 1 tablet (50 mg) by mouth daily  - triamterene-HCTZ (MAXZIDE-25) 37.5-25 MG tablet; Take 1 tablet by mouth daily    DANISHA (generalized anxiety disorder) - stable, refills  - escitalopram (LEXAPRO) 5 MG tablet; Take 1 tablet (5 mg) by mouth daily    Postablative hypothyroidism - surveillance labs normal in 8/2023. Will plan to recheck at CPE.   - levothyroxine (SYNTHROID) 200 MCG tablet; Take 1 tablet (200 mcg) by mouth daily    MILTON (obstructive sleep apnea) - would like to pursue new machine. Current working fine but she is concerned about shelf life.   - CPAP Order for DME - ONLY FOR DME    Paroxysmal atrial fibrillation (H) - on xarelto through Cardiology    Morbid obesity (H)    Screen for colon cancer  - BETSY(EXACT SCIENCES); Future    Visit for screening mammogram  - MA Screen Bilateral w/Yvon; Future    Subjective   Makenzie is a 53 year old, presenting for the following health issues:  Anxiety (Follow-up meds, anxiety)        1/18/2024     8:21 AM   Additional Questions   Roomed by Gabby Oneill     History of Present Illness       Mental Health Follow-up:  Patient presents to follow-up on Anxiety.    Patient's anxiety since last visit has been:  Good  The patient is not having other symptoms associated with anxiety.  Any significant life events: No  Patient is not feeling anxious or having panic attacks.  Patient has no concerns about alcohol or drug use.    She eats 2-3 servings of fruits and vegetables daily.She consumes 0 sweetened  "beverage(s) daily.She exercises with enough effort to increase her heart rate 10 to 19 minutes per day.    She is taking medications regularly.     Due for prescription refills.   Leaving for [place] upcoming, would like to schedule CPE when she returns.         Objective    Vitals - Patient Reported  Weight (Patient Reported): 173.3 kg (382 lb)  Height (Patient Reported): 165.1 cm (5' 5\")  BMI (Based on Pt Reported Ht/Wt): 63.57      Vitals:  No vitals were obtained today due to virtual visit.      Review of Systems  Constitutional, HEENT, cardiovascular, pulmonary, gi and gu systems are negative, except as otherwise noted.  Physical Exam   GENERAL: alert and no distress  EYES: Eyes grossly normal to inspection.  No discharge or erythema, or obvious scleral/conjunctival abnormalities.  RESP: No audible wheeze, cough, or visible cyanosis.    SKIN: Visible skin clear. No significant rash, abnormal pigmentation or lesions.  NEURO: Cranial nerves grossly intact.  Mentation and speech appropriate for age.  PSYCH: Appropriate affect, tone, and pace of words        Video-Visit Details    Type of service:  Video Visit     Originating Location (pt. Location): Home    Distant Location (provider location):  Off-site  Platform used for Video Visit: Colby  Signed Electronically by: Ariana Padilla MD    "

## 2024-01-24 ENCOUNTER — MYC MEDICAL ADVICE (OUTPATIENT)
Dept: CARDIOLOGY | Facility: CLINIC | Age: 54
End: 2024-01-24
Payer: COMMERCIAL

## 2024-01-24 DIAGNOSIS — I48.0 PAROXYSMAL ATRIAL FIBRILLATION (H): Primary | ICD-10-CM

## 2024-01-24 DIAGNOSIS — I48.3 TYPICAL ATRIAL FLUTTER (H): ICD-10-CM

## 2024-02-02 ENCOUNTER — MYC MEDICAL ADVICE (OUTPATIENT)
Dept: CARDIOLOGY | Facility: CLINIC | Age: 54
End: 2024-02-02
Payer: COMMERCIAL

## 2024-02-02 NOTE — TELEPHONE ENCOUNTER
Patient has through an employer with $4850 deductible.    Patient is eligible to download a copay card from [employer].  It reduce this to $10 per fill (up to a 90 day supply) for the first 90 days, and then take $200 off per month thereafter (can't run a claim due to Xarelto being filled on 1/30/24, but cost after the discount will likely be around $360/mo).    [employer]' copay card is actually much better.  It reduces the copay to $10/mo every month.  May be worth considering a switch for the sake of cost.     Lois Gutierrez  Pharmacy Technician/Liaison, Discharge Pharmacy   [phone] (voice or text)  [email or web address]

## 2024-02-02 NOTE — TELEPHONE ENCOUNTER
2/2/24 Pt sent [employer] message with question about cost/coverage for Xarelto . Will route to pharmacy Liayifan Aguilar 8 am

## 2024-02-20 ENCOUNTER — HOSPITAL ENCOUNTER (OUTPATIENT)
Dept: CARDIOLOGY | Facility: CLINIC | Age: 54
Discharge: HOME | End: 2024-02-20
Attending: PHYSICIAN ASSISTANT | Admitting: PHYSICIAN ASSISTANT
Payer: COMMERCIAL

## 2024-02-20 DIAGNOSIS — I48.0 PAROXYSMAL ATRIAL FIBRILLATION (H): ICD-10-CM

## 2024-02-20 LAB — LVEF ECHO: NORMAL

## 2024-02-20 PROCEDURE — C8929 TTE W OR WO FOL WCON,DOPPLER: HCPCS

## 2024-02-20 PROCEDURE — 93306 TTE W/DOPPLER COMPLETE: CPT | Mod: 26 | Performed by: INTERNAL MEDICINE

## 2024-02-20 PROCEDURE — 999N000208 ECHOCARDIOGRAM COMPLETE

## 2024-02-20 PROCEDURE — 255N000002 HC RX 255 OP 636: Performed by: PHYSICIAN ASSISTANT

## 2024-02-20 RX ADMIN — HUMAN ALBUMIN MICROSPHERES AND PERFLUTREN 3 ML: 10; .22 INJECTION, SOLUTION INTRAVENOUS at 15:24

## 2024-02-23 ENCOUNTER — TELEPHONE (OUTPATIENT)
Dept: FAMILY MEDICINE | Facility: CLINIC | Age: 54
End: 2024-02-23
Payer: COMMERCIAL

## 2024-02-23 NOTE — TELEPHONE ENCOUNTER
Patient Quality Outreach    Patient is due for the following:   Breast Cancer Screening - Mammogram    Next Steps:   Schedule a office visit for mammogram    Type of outreach:    Sent [employer] message.    Next Steps:  Reach out within 90 days via [employer].    Max number of attempts reached: No. Will try again in 90 days if patient still on fail list.    Questions for provider review:    None           Raffi Sherman, [facility]  Chart routed to none.

## 2024-03-05 ENCOUNTER — TELEPHONE (OUTPATIENT)
Dept: CARDIOLOGY | Facility: CLINIC | Age: 54
End: 2024-03-05
Payer: COMMERCIAL

## 2024-03-05 DIAGNOSIS — I48.0 PAROXYSMAL ATRIAL FIBRILLATION (H): Primary | ICD-10-CM

## 2024-03-05 NOTE — TELEPHONE ENCOUNTER
3/5/24 Msg recd from chart prep team    Penny Conley, JOLENE [facility]uel[facility] Heart Ep Nurse  Hi,  This pt is seeing [facility] for an overdue annual, she is taking Xarelto and has not had a CBC in over a year, do you want to order labs?  Thanks, Penny PADILLA    Order placed, message sent to scheduling    [facility] 1145 am

## 2024-03-06 ENCOUNTER — OFFICE VISIT (OUTPATIENT)
Dept: CARDIOLOGY | Facility: CLINIC | Age: 54
End: 2024-03-06
Attending: NURSE PRACTITIONER
Payer: COMMERCIAL

## 2024-03-06 ENCOUNTER — LAB (OUTPATIENT)
Dept: LAB | Facility: CLINIC | Age: 54
End: 2024-03-06
Payer: COMMERCIAL

## 2024-03-06 VITALS
BODY MASS INDEX: 51.91 KG/M2 | OXYGEN SATURATION: 98 % | WEIGHT: 293 LBS | HEIGHT: 63 IN | SYSTOLIC BLOOD PRESSURE: 120 MMHG | DIASTOLIC BLOOD PRESSURE: 80 MMHG | HEART RATE: 85 BPM

## 2024-03-06 DIAGNOSIS — I48.11 LONGSTANDING PERSISTENT ATRIAL FIBRILLATION (H): Primary | ICD-10-CM

## 2024-03-06 DIAGNOSIS — I48.0 PAROXYSMAL ATRIAL FIBRILLATION (H): ICD-10-CM

## 2024-03-06 LAB
ERYTHROCYTE [DISTWIDTH] IN BLOOD BY AUTOMATED COUNT: 18.5 % (ref 10–15)
HCT VFR BLD AUTO: 34.1 % (ref 35–47)
HGB BLD-MCNC: 9.5 G/DL (ref 11.7–15.7)
MCH RBC QN AUTO: 18.7 PG (ref 26.5–33)
MCHC RBC AUTO-ENTMCNC: 27.9 G/DL (ref 31.5–36.5)
MCV RBC AUTO: 67 FL (ref 78–100)
PLATELET # BLD AUTO: 529 10E3/UL (ref 150–450)
RBC # BLD AUTO: 5.08 10E6/UL (ref 3.8–5.2)
WBC # BLD AUTO: 11.9 10E3/UL (ref 4–11)

## 2024-03-06 PROCEDURE — 93000 ELECTROCARDIOGRAM COMPLETE: CPT | Performed by: PHYSICIAN ASSISTANT

## 2024-03-06 PROCEDURE — 85027 COMPLETE CBC AUTOMATED: CPT | Performed by: PHYSICIAN ASSISTANT

## 2024-03-06 PROCEDURE — 36415 COLL VENOUS BLD VENIPUNCTURE: CPT | Performed by: PHYSICIAN ASSISTANT

## 2024-03-06 PROCEDURE — 99214 OFFICE O/P EST MOD 30 MIN: CPT | Performed by: PHYSICIAN ASSISTANT

## 2024-03-06 NOTE — LETTER
3/6/2024    Ariana Padilla MD  [address]  [facility] [address]    RE: Makenzie Blanco       Dear Colleague,     I had the pleasure of seeing Makenzie Blanco in the [facility].    Electrophysiology Clinic Progress Note    Makenzie Blanco MRN# [id number]   YOB: 1970 Age: 54 year old     Primary cardiology team: [facility] Dr. Johnson          Assessment and Plan     In summary, Makenzie Blanco presents today for routine annual follow up regarding chronic atrial fibrillation. She appears to be doing well from this standpoint, with minimal to no symptoms, and adequate rate control on atenolol. Of some concern, her routine CBC today is quite abnormal. She is currently experiencing menstruation but is otherwise in her usual state of health.     Plan:  I have sent a message to pt's PCP Dr. Padilla regarding evaluation/management of new anemia, leukocytosis and thrombocytosis. Makenzie will call their office Friday to follow up on this if she hasn't heard back by then.   For now, we will continue her current medications including Xarelto. She will keep me updated on the outcome of above. We will plan to see her back in one year.     LUZMARIA Maguire [facility] - [facility]         History of Presenting Illness     Makenzie Blnaco is a pleasant 54 year old patient with a pertinent history of the following -   Paroxysmal AF/AFL, which became persistent in 2020. At that time, she was asymptomatic and declined cardioversion. She has been on rate control strategy with atenolol and anticoagulation with Xarelto for a [id number] score of 2.  HTN.  Morbid obesity.   MILTON.     Today, I am meeting Makenzie who is very pleasant, and has been feeling well from a cardiovascular standpoint for the past year. She has rare palpitations. Patient denies chest pain, shortness of breath, PND, orthopnea, edema, claudication, near syncope or syncope. Checks her HR routinely with a pulse  "oximeter and typically obtains readings in the 80's, rarely >100 bpm at rest. Her greatest limitation is from arthritic knee pain. She does not use NSAIDs to manage this. Hopes to have surgery at some point in the future.     BMP 8/2023 showed normal renal fn. CBC today is quite abnormal - her white ct is mildly elevated at 11.9, platelet ct high at 529, and hemoglobin low at 9.5. This is all new from her last check a year ago. Is on her period right now which is actually lighter than usual. Typically quite heavy and lasts a couple weeks.    We reviewed her echocardiogram from last month, which was a technically challenging study, but showed a preserved EF without evidence of significant valvular heart disease.          Review of Systems     12-pt ROS is negative except for as noted in the HPI.          Physical Exam     Vitals: /80 (BP Location: Right arm, Patient Position: Sitting, Cuff Size: Adult Large)   Pulse 85   Ht 1.6 m (5' 3\")   Wt (!) 169.6 kg (374 lb)   SpO2 98%   BMI 66.25 kg/m    Wt Readings from Last 10 Encounters:   03/06/24 (!) 169.6 kg (374 lb)   12/13/21 (!) 177.8 kg (392 lb)   07/22/20 (!) 173.3 kg (382 lb)   09/05/18 (!) 177.4 kg (391 lb)   12/13/17 (!) 179 kg (394 lb 9.6 oz)   12/11/17 (!) 180 kg (396 lb 12.8 oz)   02/22/17 (!) 180.3 kg (397 lb 8 oz)   06/23/16 (!) 176.9 kg (389 lb 14.4 oz)   05/25/16 (!) 173.3 kg (382 lb)   04/01/16 (!) 174.6 kg (385 lb)       Constitutional:  Patient is pleasant, alert, cooperative, and in NAD.  HEENT:  NCAT. PERRLA. EOM's intact.   Neck:  CVP appears normal. No carotid bruits.   Pulmonary: Normal respiratory effort. CTAB.   Cardiac: Irregularly irregular, normal S1/S2, no S3/S4, no murmur or rub.   Abdomen:  Non-tender abdomen, no hepatosplenomegaly appreciated.   Vascular: Pulses in the upper and lower extremities are 2+ and equal bilaterally.  Extremities: No edema, erythema, cyanosis or tenderness appreciated.  Skin:  No rashes or lesions " appreciated.   Neurological:  No gross motor or sensory deficits.   Psych: Appropriate affect.          Data   Labs reviewed:  Recent Labs   Lab Test 08/17/23  0726 03/04/23  0922 12/13/21  1407 07/15/20  1011   LDL  --  70 74  --    HDL  --  44* 44*  --    NHDL  --  91 97  --    CHOL  --  135 141  --    TRIG  --  107 115  --    TSH 3.39  --  4.74* 3.09       Lab Results   Component Value Date    WBC 11.9 (H) 03/06/2024    WBC 12.2 (H) 12/11/2017    RBC 5.08 03/06/2024    RBC 4.90 12/11/2017    HGB 9.5 (L) 03/06/2024    HGB 13.3 07/15/2020    HCT 34.1 (L) 03/06/2024    HCT 39.7 12/11/2017    MCV 67 (L) 03/06/2024    MCV 81 12/11/2017    MCH 18.7 (L) 03/06/2024    MCH 26.5 12/11/2017    MCHC 27.9 (L) 03/06/2024    MCHC 32.7 12/11/2017    RDW 18.5 (H) 03/06/2024    RDW 14.3 12/11/2017     (H) 03/06/2024     12/11/2017       Lab Results   Component Value Date     08/17/2023     07/15/2020    POTASSIUM 4.3 08/17/2023    POTASSIUM 4.1 12/13/2021    POTASSIUM 4.0 07/15/2020    CHLORIDE 101 08/17/2023    CHLORIDE 99 12/13/2021    CHLORIDE 103 07/15/2020    CO2 26 08/17/2023    CO2 29 12/13/2021    CO2 28 07/15/2020    ANIONGAP 12 08/17/2023    ANIONGAP 4 12/13/2021    ANIONGAP 6 07/15/2020     (H) 08/17/2023    GLC 98 12/13/2021    GLC 98 07/15/2020    BUN 12.6 08/17/2023    BUN 15 12/13/2021    BUN 13 07/15/2020    CR 0.76 08/17/2023    CR 0.76 07/15/2020    GFRESTIMATED >90 08/17/2023    GFRESTIMATED >90 07/15/2020    GFRESTBLACK >90 07/15/2020    GERMAN 9.5 08/17/2023    GERMAN 8.9 07/15/2020      Lab Results   Component Value Date    AST 14 12/13/2021    AST 20 01/11/2018    ALT 21 12/13/2021    ALT 25 01/11/2018       Lab Results   Component Value Date    A1C 5.5 12/13/2021       Lab Results   Component Value Date    INR 1.29 (H) 12/11/2017            Problem List     Patient Active Problem List   Diagnosis    Morbid obesity (H)    Postablative hypothyroidism    Paroxysmal atrial  fibrillation (H)    Benign essential hypertension    DANISHA (generalized anxiety disorder)    MILTON (obstructive sleep apnea)            Medications     Current Outpatient Medications   Medication Sig Dispense Refill    atenolol (TENORMIN) 50 MG tablet Take 1 tablet (50 mg) by mouth daily 90 tablet 1    escitalopram (LEXAPRO) 5 MG tablet Take 1 tablet (5 mg) by mouth daily 90 tablet 1    levothyroxine (SYNTHROID) 200 MCG tablet Take 1 tablet (200 mcg) by mouth daily 90 tablet 1    rivaroxaban ANTICOAGULANT (XARELTO ANTICOAGULANT) 20 MG TABS tablet Take 1 tablet (20 mg) by mouth daily (with dinner) Appointment required for further refills 60 tablet 0    triamterene-HCTZ (MAXZIDE-25) 37.5-25 MG tablet Take 1 tablet by mouth daily 90 tablet 1            Past Medical History     Past Medical History:   Diagnosis Date    Abdominal pain 12/11/2017    Adjustment disorder with mixed anxiety and depressed mood 5/6/2014    Anxiety     Arrhythmia     afib    Cardiomyopathy (H)     Chronic ear infection     Gastroesophageal reflux disease     HTN (hypertension)     Hypothyroid     LVH (left ventricular hypertrophy)     Morbid obesity (H)     MILTON (obstructive sleep apnea)     CPAP    Palpitations     Typical atrial flutter (H) 5/24/2016     Past Surgical History:   Procedure Laterality Date    dental extractions      wisdom teeth    GYN SURGERY      D&C    LAPAROSCOPIC CHOLECYSTECTOMY N/A 12/13/2017    Procedure: LAPAROSCOPIC CHOLECYSTECTOMY;  LAPAROSCOPIC CHOLECYSTECTOMY ;  Surgeon: Kvng Davidson MD;  Location:  OR     Family History   Problem Relation Age of Onset    Hypertension Mother      Social History     Socioeconomic History    Marital status:      Spouse name: Not on file    Number of children: Not on file    Years of education: Not on file    Highest education level: Not on file   Occupational History    Not on file   Tobacco Use    Smoking status: Never     Passive exposure: Never    Smokeless tobacco:  Never   Vaping Use    Vaping Use: Never used   Substance and Sexual Activity    Alcohol use: Not Currently    Drug use: No    Sexual activity: Not on file   Other Topics Concern     Service Not Asked    Blood Transfusions Not Asked    Caffeine Concern Yes     Comment: 1 cups of coffee a day     Occupational Exposure Not Asked    Hobby Hazards Not Asked    Sleep Concern Not Asked    Stress Concern Not Asked    Weight Concern Not Asked    Special Diet No    Back Care Not Asked    Exercise No    Bike Helmet Not Asked    Seat Belt Not Asked    Self-Exams Not Asked    Parent/sibling w/ CABG, MI or angioplasty before 65F 55M? Not Asked   Social History Narrative    Not on file     Social Determinants of Health     Financial Resource Strain: Low Risk  (1/18/2024)    Financial Resource Strain     Within the past 12 months, have you or your family members you live with been unable to get utilities (heat, electricity) when it was really needed?: No   Food Insecurity: Low Risk  (1/18/2024)    Food Insecurity     Within the past 12 months, did you worry that your food would run out before you got money to buy more?: No     Within the past 12 months, did the food you bought just not last and you didn t have money to get more?: No   Transportation Needs: Low Risk  (1/18/2024)    Transportation Needs     Within the past 12 months, has lack of transportation kept you from medical appointments, getting your medicines, non-medical meetings or appointments, work, or from getting things that you need?: No   Physical Activity: Inactive (12/7/2021)    Exercise Vital Sign     Days of Exercise per Week: 0 days     Minutes of Exercise per Session: 0 min   Stress: No Stress Concern Present (12/7/2021)    [employer] [place] [employer] - Occupational Stress Questionnaire     Feeling of Stress : Only a little   Social Connections: Moderately Integrated (12/7/2021)    Social Connection and Isolation Panel [NHANES]      Frequency of Communication with Friends and Family: More than three times a week     Frequency of Social Gatherings with Friends and Family: More than three times a week     Attends Sikh Services: More than 4 times per year     Active Member of Clubs or Organizations: No     Attends Club or Organization Meetings: Not on file     Marital Status:    Interpersonal Safety: Not on file   Housing Stability: Low Risk  (1/18/2024)    Housing Stability     Do you have housing? : Yes     Are you worried about losing your housing?: No            Allergies   Patient has no known allergies.    Today's clinic visit entailed:  Review of the result(s) of each unique test - CBC, BMP, echo, EKG  Ordering of each unique test  I spent a total of 30 minutes on the day of the visit.   Time spent by me doing chart review, history and exam, documentation and further activities per the note  Provider  Link to [facility] Help Grid     The level of medical decision making during this visit was of moderate complexity.    Thank you for allowing me to participate in the care of your patient.      Sincerely,     Radha Gar PA-C     [facility]  cc:   CELSO Bernal CNP  [address]  [place] [address]

## 2024-03-06 NOTE — PROGRESS NOTES
Electrophysiology Clinic Progress Note    Makenzie Blanco MRN# [id number]   YOB: 1970 Age: 54 year old     Primary cardiology team: [facility] Dr. Johnson          Assessment and Plan     In summary, Makenzie Blanco presents today for routine annual follow up regarding chronic atrial fibrillation. She appears to be doing well from this standpoint, with minimal to no symptoms, and adequate rate control on atenolol. Of some concern, her routine CBC today is quite abnormal. She is currently experiencing menstruation but is otherwise in her usual state of health.     Plan:  I have sent a message to pt's PCP Dr. Padilla regarding evaluation/management of new anemia, leukocytosis and thrombocytosis. Makenzie will call their office Friday to follow up on this if she hasn't heard back by then.   For now, we will continue her current medications including Xarelto. She will keep me updated on the outcome of above. We will plan to see her back in one year.     Radha Gar PA-C  [facility] - [facility]         History of Presenting Illness     Makenzie Blanco is a pleasant 54 year old patient with a pertinent history of the following -   Paroxysmal AF/AFL, which became persistent in 2020. At that time, she was asymptomatic and declined cardioversion. She has been on rate control strategy with atenolol and anticoagulation with Xarelto for a [id number] score of 2.  HTN.  Morbid obesity.   MILTON.     Today, I am meeting Makenzie who is very pleasant, and has been feeling well from a cardiovascular standpoint for the past year. She has rare palpitations. Patient denies chest pain, shortness of breath, PND, orthopnea, edema, claudication, near syncope or syncope. Checks her HR routinely with a pulse oximeter and typically obtains readings in the 80's, rarely >100 bpm at rest. Her greatest limitation is from arthritic knee pain. She does not use NSAIDs to manage this. Hopes to have surgery at some point in the  "future.     BMP 8/2023 showed normal renal fn. CBC today is quite abnormal - her white ct is mildly elevated at 11.9, platelet ct high at 529, and hemoglobin low at 9.5. This is all new from her last check a year ago. Is on her period right now which is actually lighter than usual. Typically quite heavy and lasts a couple weeks.    We reviewed her echocardiogram from last month, which was a technically challenging study, but showed a preserved EF without evidence of significant valvular heart disease.          Review of Systems     12-pt ROS is negative except for as noted in the HPI.          Physical Exam     Vitals: /80 (BP Location: Right arm, Patient Position: Sitting, Cuff Size: Adult Large)   Pulse 85   Ht 1.6 m (5' 3\")   Wt (!) 169.6 kg (374 lb)   SpO2 98%   BMI 66.25 kg/m    Wt Readings from Last 10 Encounters:   03/06/24 (!) 169.6 kg (374 lb)   12/13/21 (!) 177.8 kg (392 lb)   07/22/20 (!) 173.3 kg (382 lb)   09/05/18 (!) 177.4 kg (391 lb)   12/13/17 (!) 179 kg (394 lb 9.6 oz)   12/11/17 (!) 180 kg (396 lb 12.8 oz)   02/22/17 (!) 180.3 kg (397 lb 8 oz)   06/23/16 (!) 176.9 kg (389 lb 14.4 oz)   05/25/16 (!) 173.3 kg (382 lb)   04/01/16 (!) 174.6 kg (385 lb)       Constitutional:  Patient is pleasant, alert, cooperative, and in NAD.  HEENT:  NCAT. PERRLA. EOM's intact.   Neck:  CVP appears normal. No carotid bruits.   Pulmonary: Normal respiratory effort. CTAB.   Cardiac: Irregularly irregular, normal S1/S2, no S3/S4, no murmur or rub.   Abdomen:  Non-tender abdomen, no hepatosplenomegaly appreciated.   Vascular: Pulses in the upper and lower extremities are 2+ and equal bilaterally.  Extremities: No edema, erythema, cyanosis or tenderness appreciated.  Skin:  No rashes or lesions appreciated.   Neurological:  No gross motor or sensory deficits.   Psych: Appropriate affect.          Data   Labs reviewed:  Recent Labs   Lab Test 08/17/23  0726 03/04/23  0922 12/13/21  1407 07/15/20  1011   LDL  -- "  70 74  --    HDL  --  44* 44*  --    NHDL  --  91 97  --    CHOL  --  135 141  --    TRIG  --  107 115  --    TSH 3.39  --  4.74* 3.09       Lab Results   Component Value Date    WBC 11.9 (H) 03/06/2024    WBC 12.2 (H) 12/11/2017    RBC 5.08 03/06/2024    RBC 4.90 12/11/2017    HGB 9.5 (L) 03/06/2024    HGB 13.3 07/15/2020    HCT 34.1 (L) 03/06/2024    HCT 39.7 12/11/2017    MCV 67 (L) 03/06/2024    MCV 81 12/11/2017    MCH 18.7 (L) 03/06/2024    MCH 26.5 12/11/2017    MCHC 27.9 (L) 03/06/2024    MCHC 32.7 12/11/2017    RDW 18.5 (H) 03/06/2024    RDW 14.3 12/11/2017     (H) 03/06/2024     12/11/2017       Lab Results   Component Value Date     08/17/2023     07/15/2020    POTASSIUM 4.3 08/17/2023    POTASSIUM 4.1 12/13/2021    POTASSIUM 4.0 07/15/2020    CHLORIDE 101 08/17/2023    CHLORIDE 99 12/13/2021    CHLORIDE 103 07/15/2020    CO2 26 08/17/2023    CO2 29 12/13/2021    CO2 28 07/15/2020    ANIONGAP 12 08/17/2023    ANIONGAP 4 12/13/2021    ANIONGAP 6 07/15/2020     (H) 08/17/2023    GLC 98 12/13/2021    GLC 98 07/15/2020    BUN 12.6 08/17/2023    BUN 15 12/13/2021    BUN 13 07/15/2020    CR 0.76 08/17/2023    CR 0.76 07/15/2020    GFRESTIMATED >90 08/17/2023    GFRESTIMATED >90 07/15/2020    GFRESTBLACK >90 07/15/2020    GERMAN 9.5 08/17/2023    GERMAN 8.9 07/15/2020      Lab Results   Component Value Date    AST 14 12/13/2021    AST 20 01/11/2018    ALT 21 12/13/2021    ALT 25 01/11/2018       Lab Results   Component Value Date    A1C 5.5 12/13/2021       Lab Results   Component Value Date    INR 1.29 (H) 12/11/2017            Problem List     Patient Active Problem List   Diagnosis    Morbid obesity (H)    Postablative hypothyroidism    Paroxysmal atrial fibrillation (H)    Benign essential hypertension    DANISHA (generalized anxiety disorder)    MILTON (obstructive sleep apnea)            Medications     Current Outpatient Medications   Medication Sig Dispense Refill    atenolol  (TENORMIN) 50 MG tablet Take 1 tablet (50 mg) by mouth daily 90 tablet 1    escitalopram (LEXAPRO) 5 MG tablet Take 1 tablet (5 mg) by mouth daily 90 tablet 1    levothyroxine (SYNTHROID) 200 MCG tablet Take 1 tablet (200 mcg) by mouth daily 90 tablet 1    rivaroxaban ANTICOAGULANT (XARELTO ANTICOAGULANT) 20 MG TABS tablet Take 1 tablet (20 mg) by mouth daily (with dinner) Appointment required for further refills 60 tablet 0    triamterene-HCTZ (MAXZIDE-25) 37.5-25 MG tablet Take 1 tablet by mouth daily 90 tablet 1            Past Medical History     Past Medical History:   Diagnosis Date    Abdominal pain 12/11/2017    Adjustment disorder with mixed anxiety and depressed mood 5/6/2014    Anxiety     Arrhythmia     afib    Cardiomyopathy (H)     Chronic ear infection     Gastroesophageal reflux disease     HTN (hypertension)     Hypothyroid     LVH (left ventricular hypertrophy)     Morbid obesity (H)     MILTON (obstructive sleep apnea)     CPAP    Palpitations     Typical atrial flutter (H) 5/24/2016     Past Surgical History:   Procedure Laterality Date    dental extractions      wisdom teeth    GYN SURGERY      D&C    LAPAROSCOPIC CHOLECYSTECTOMY N/A 12/13/2017    Procedure: LAPAROSCOPIC CHOLECYSTECTOMY;  LAPAROSCOPIC CHOLECYSTECTOMY ;  Surgeon: Kvng Davidson MD;  Location: [facility] OR     Family History   Problem Relation Age of Onset    Hypertension Mother      Social History     Socioeconomic History    Marital status:      Spouse name: Not on file    Number of children: Not on file    Years of education: Not on file    Highest education level: Not on file   Occupational History    Not on file   Tobacco Use    Smoking status: Never     Passive exposure: Never    Smokeless tobacco: Never   Vaping Use    Vaping Use: Never used   Substance and Sexual Activity    Alcohol use: Not Currently    Drug use: No    Sexual activity: Not on file   Other Topics Concern     Service Not Asked    Blood  Transfusions Not Asked    Caffeine Concern Yes     Comment: 1 cups of coffee a day     Occupational Exposure Not Asked    Hobby Hazards Not Asked    Sleep Concern Not Asked    Stress Concern Not Asked    Weight Concern Not Asked    Special Diet No    Back Care Not Asked    Exercise No    Bike Helmet Not Asked    Seat Belt Not Asked    Self-Exams Not Asked    Parent/sibling w/ CABG, MI or angioplasty before 65F 55M? Not Asked   Social History Narrative    Not on file     Social Determinants of Health     Financial Resource Strain: Low Risk  (1/18/2024)    Financial Resource Strain     Within the past 12 months, have you or your family members you live with been unable to get utilities (heat, electricity) when it was really needed?: No   Food Insecurity: Low Risk  (1/18/2024)    Food Insecurity     Within the past 12 months, did you worry that your food would run out before you got money to buy more?: No     Within the past 12 months, did the food you bought just not last and you didn t have money to get more?: No   Transportation Needs: Low Risk  (1/18/2024)    Transportation Needs     Within the past 12 months, has lack of transportation kept you from medical appointments, getting your medicines, non-medical meetings or appointments, work, or from getting things that you need?: No   Physical Activity: Inactive (12/7/2021)    Exercise Vital Sign     Days of Exercise per Week: 0 days     Minutes of Exercise per Session: 0 min   Stress: No Stress Concern Present (12/7/2021)    [employer] [place] [employer]ational [employer] - Occupational Stress Questionnaire     Feeling of Stress : Only a little   Social Connections: Moderately Integrated (12/7/2021)    Social Connection and Isolation Panel [NHANES]     Frequency of Communication with Friends and Family: More than three times a week     Frequency of Social Gatherings with Friends and Family: More than three times a week     Attends Shinto Services: More than 4 times per  year     Active Member of Clubs or Organizations: No     Attends Club or Organization Meetings: Not on file     Marital Status:    Interpersonal Safety: Not on file   Housing Stability: Low Risk  (1/18/2024)    Housing Stability     Do you have housing? : Yes     Are you worried about losing your housing?: No            Allergies   Patient has no known allergies.    Today's clinic visit entailed:  Review of the result(s) of each unique test - CBC, BMP, echo, EKG  Ordering of each unique test  I spent a total of 30 minutes on the day of the visit.   Time spent by me doing chart review, history and exam, documentation and further activities per the note  Provider  Link to MDM Help Grid     The level of medical decision making during this visit was of moderate complexity.

## 2024-03-06 NOTE — PATIENT INSTRUCTIONS
Today's Plan:   I will send the CBC results over to your PCP - if you don't hear from her by Friday, please call Dr. Padilla's office for direction.   If you start to notice your resting HR is sitting > 100 frequently, call us so we can adjust your atenolol.   Otherwise, we'll plan to do this again in a year.     If you have questions or concerns please call my nurse team at [phone].   Scheduling phone number: [phone]  Reminder: Please bring in all current medications, over the counter supplements and vitamin bottles to your next appointment.    It was a pleasure seeing you today!     Radha Gar PA-C

## 2024-03-07 ENCOUNTER — MYC MEDICAL ADVICE (OUTPATIENT)
Dept: FAMILY MEDICINE | Facility: CLINIC | Age: 54
End: 2024-03-07
Payer: COMMERCIAL

## 2024-03-07 NOTE — TELEPHONE ENCOUNTER
Call to triage. Is she feeling well? Her WBC and platelet indicated that she may have illness - even a cold could do this.     As far as the hemoglobin goes, she needs iron replacement. Please advise 1 tablet ferrous sulfate daily for the next 1 month. Looks like cardiology placed repeat labs - she can have these completed in a month.

## 2024-03-07 NOTE — TELEPHONE ENCOUNTER
Writer called patient, on behalf of Dr. Padilla. Patient states she feels fine, a little tired but she's busy, no worse than usual. She does have her period currently. Advised about taking Ferrous Sulfate, advised doing labs in 1 month, patient will follow up with Cardiology if she can do them earlier as she is going to [place] for a few weeks on 3/25/24. Will forward the message to patient per her request.

## 2024-04-16 ENCOUNTER — LAB (OUTPATIENT)
Dept: LAB | Facility: CLINIC | Age: 54
End: 2024-04-16
Payer: COMMERCIAL

## 2024-04-16 DIAGNOSIS — I48.11 LONGSTANDING PERSISTENT ATRIAL FIBRILLATION (H): ICD-10-CM

## 2024-04-16 LAB
ERYTHROCYTE [DISTWIDTH] IN BLOOD BY AUTOMATED COUNT: 22.9 % (ref 10–15)
HCT VFR BLD AUTO: 37 % (ref 35–47)
HGB BLD-MCNC: 10.6 G/DL (ref 11.7–15.7)
MCH RBC QN AUTO: 21.1 PG (ref 26.5–33)
MCHC RBC AUTO-ENTMCNC: 28.6 G/DL (ref 31.5–36.5)
MCV RBC AUTO: 74 FL (ref 78–100)
PLATELET # BLD AUTO: 435 10E3/UL (ref 150–450)
RBC # BLD AUTO: 5.03 10E6/UL (ref 3.8–5.2)
WBC # BLD AUTO: 9.8 10E3/UL (ref 4–11)

## 2024-04-16 PROCEDURE — 36415 COLL VENOUS BLD VENIPUNCTURE: CPT

## 2024-04-16 PROCEDURE — 80048 BASIC METABOLIC PNL TOTAL CA: CPT

## 2024-04-16 PROCEDURE — 85027 COMPLETE CBC AUTOMATED: CPT

## 2024-04-17 LAB
ANION GAP SERPL CALCULATED.3IONS-SCNC: 16 MMOL/L (ref 7–15)
BUN SERPL-MCNC: 13.4 MG/DL (ref 6–20)
CALCIUM SERPL-MCNC: 9.5 MG/DL (ref 8.6–10)
CHLORIDE SERPL-SCNC: 100 MMOL/L (ref 98–107)
CREAT SERPL-MCNC: 0.77 MG/DL (ref 0.51–0.95)
DEPRECATED HCO3 PLAS-SCNC: 21 MMOL/L (ref 22–29)
EGFRCR SERPLBLD CKD-EPI 2021: >90 ML/MIN/1.73M2
GLUCOSE SERPL-MCNC: 103 MG/DL (ref 70–99)
POTASSIUM SERPL-SCNC: 4.7 MMOL/L (ref 3.4–5.3)
SODIUM SERPL-SCNC: 137 MMOL/L (ref 135–145)

## 2024-05-13 ENCOUNTER — DOCUMENTATION ONLY (OUTPATIENT)
Dept: SLEEP MEDICINE | Facility: CLINIC | Age: 54
End: 2024-05-13
Payer: COMMERCIAL

## 2024-05-13 DIAGNOSIS — G47.33 OBSTRUCTIVE SLEEP APNEA (ADULT) (PEDIATRIC): Primary | ICD-10-CM

## 2024-05-13 NOTE — PROGRESS NOTES
Patient was offered choice of vendor and chose [facility].  Patient Makenzie Blanco was set up at [facility] on May 13, 2024. Patient received a [employer]med Airsense 10 Pressures were set at  5-20 cm H2O.   Patient s ramp is 5 cm H2O for Auto and FLEX/EPR is EPR, 2.  Patient received a [employer] Mask name: ENEIDA VIEW   Full Face mask size Medium, heated tubing and heated humidifier.  Patient has the following compliance requirements: none  Patient has a follow up on TBD with HARJINDER DEUTSCH.  Pamela David

## 2024-05-26 ENCOUNTER — HEALTH MAINTENANCE LETTER (OUTPATIENT)
Age: 54
End: 2024-05-26

## 2024-07-12 DIAGNOSIS — F41.1 GAD (GENERALIZED ANXIETY DISORDER): ICD-10-CM

## 2024-07-12 RX ORDER — ESCITALOPRAM OXALATE 5 MG/1
5 TABLET ORAL DAILY
Qty: 90 TABLET | Refills: 0 | Status: SHIPPED | OUTPATIENT
Start: 2024-07-12

## 2024-07-14 DIAGNOSIS — I10 BENIGN ESSENTIAL HYPERTENSION: ICD-10-CM

## 2024-07-15 DIAGNOSIS — I10 BENIGN ESSENTIAL HYPERTENSION: ICD-10-CM

## 2024-07-15 RX ORDER — TRIAMTERENE/HYDROCHLOROTHIAZID 37.5-25 MG
1 TABLET ORAL DAILY
Qty: 90 TABLET | Refills: 0 | Status: SHIPPED | OUTPATIENT
Start: 2024-07-15

## 2024-07-15 RX ORDER — ATENOLOL 50 MG/1
50 TABLET ORAL DAILY
Qty: 90 TABLET | Refills: 1 | Status: SHIPPED | OUTPATIENT
Start: 2024-07-15

## 2024-07-25 ENCOUNTER — MYC REFILL (OUTPATIENT)
Dept: FAMILY MEDICINE | Facility: CLINIC | Age: 54
End: 2024-07-25
Payer: COMMERCIAL

## 2024-07-25 DIAGNOSIS — E89.0 POSTABLATIVE HYPOTHYROIDISM: ICD-10-CM

## 2024-07-25 RX ORDER — LEVOTHYROXINE SODIUM 200 UG/1
200 TABLET ORAL DAILY
Qty: 90 TABLET | Refills: 0 | Status: SHIPPED | OUTPATIENT
Start: 2024-07-25

## 2024-08-01 ENCOUNTER — TELEPHONE (OUTPATIENT)
Dept: FAMILY MEDICINE | Facility: CLINIC | Age: 54
End: 2024-08-01
Payer: COMMERCIAL

## 2024-08-01 NOTE — TELEPHONE ENCOUNTER
Patient Quality Outreach    Patient is due for the following:   Physical Preventive Adult Physical    Next Steps:   No follow up needed at this time.    Type of outreach:    Sent [employer] message.    Next Steps:  Reach out within 90 days via [employer].    Max number of attempts reached: No. Will try again in 90 days if patient still on fail list.    Questions for provider review:    None           Raffi Sherman, [facility]  Chart routed to none.

## 2024-10-11 DIAGNOSIS — F41.1 GAD (GENERALIZED ANXIETY DISORDER): ICD-10-CM

## 2024-10-11 RX ORDER — ESCITALOPRAM OXALATE 5 MG/1
5 TABLET ORAL DAILY
Qty: 90 TABLET | Refills: 0 | Status: SHIPPED | OUTPATIENT
Start: 2024-10-11

## 2024-10-19 DIAGNOSIS — E89.0 POSTABLATIVE HYPOTHYROIDISM: ICD-10-CM

## 2024-10-21 DIAGNOSIS — I10 BENIGN ESSENTIAL HYPERTENSION: ICD-10-CM

## 2024-10-21 RX ORDER — TRIAMTERENE AND HYDROCHLOROTHIAZIDE 37.5; 25 MG/1; MG/1
1 TABLET ORAL DAILY
Qty: 90 TABLET | Refills: 0 | Status: SHIPPED | OUTPATIENT
Start: 2024-10-21

## 2024-10-21 RX ORDER — LEVOTHYROXINE SODIUM 200 UG/1
200 TABLET ORAL DAILY
Qty: 30 TABLET | Refills: 0 | Status: SHIPPED | OUTPATIENT
Start: 2024-10-21

## 2024-11-13 ENCOUNTER — MYC MEDICAL ADVICE (OUTPATIENT)
Dept: FAMILY MEDICINE | Facility: CLINIC | Age: 54
End: 2024-11-13
Payer: COMMERCIAL

## 2024-11-15 ENCOUNTER — TELEPHONE (OUTPATIENT)
Dept: FAMILY MEDICINE | Facility: CLINIC | Age: 54
End: 2024-11-15
Payer: COMMERCIAL

## 2024-11-15 NOTE — TELEPHONE ENCOUNTER
Patient Quality Outreach    Patient is due for the following:   Physical Preventive Adult Physical    Action(s) Taken:   No follow up needed at this time.    Type of outreach:    None, patient is on the schedule for a physical    Questions for provider review:    None           Raffi Sherman CMA  Chart routed to none.

## 2024-11-17 DIAGNOSIS — E89.0 POSTABLATIVE HYPOTHYROIDISM: ICD-10-CM

## 2024-11-18 RX ORDER — LEVOTHYROXINE SODIUM 200 UG/1
200 TABLET ORAL DAILY
Qty: 30 TABLET | Refills: 0 | Status: SHIPPED | OUTPATIENT
Start: 2024-11-18

## 2024-12-09 NOTE — ADDENDUM NOTE
Addended by: HARJINDER DELVALLE on: 12/5/2022 01:24 PM     Modules accepted: Level of Service    
36.7

## 2024-12-15 DIAGNOSIS — E89.0 POSTABLATIVE HYPOTHYROIDISM: ICD-10-CM

## 2024-12-16 RX ORDER — LEVOTHYROXINE SODIUM 200 UG/1
200 TABLET ORAL DAILY
Qty: 90 TABLET | Refills: 0 | Status: SHIPPED | OUTPATIENT
Start: 2024-12-16

## 2025-01-08 DIAGNOSIS — F41.1 GAD (GENERALIZED ANXIETY DISORDER): ICD-10-CM

## 2025-01-08 DIAGNOSIS — I10 BENIGN ESSENTIAL HYPERTENSION: ICD-10-CM

## 2025-01-08 RX ORDER — ATENOLOL 50 MG/1
50 TABLET ORAL DAILY
Qty: 90 TABLET | Refills: 0 | Status: SHIPPED | OUTPATIENT
Start: 2025-01-08

## 2025-01-08 RX ORDER — ESCITALOPRAM OXALATE 5 MG/1
5 TABLET ORAL DAILY
Qty: 90 TABLET | Refills: 0 | Status: SHIPPED | OUTPATIENT
Start: 2025-01-08

## 2025-02-04 ENCOUNTER — PATIENT OUTREACH (OUTPATIENT)
Dept: CARE COORDINATION | Facility: CLINIC | Age: 55
End: 2025-02-04
Payer: COMMERCIAL

## 2025-02-11 ENCOUNTER — MYC MEDICAL ADVICE (OUTPATIENT)
Dept: FAMILY MEDICINE | Facility: CLINIC | Age: 55
End: 2025-02-11
Payer: COMMERCIAL

## 2025-02-11 NOTE — TELEPHONE ENCOUNTER
Kaitlyn do you want to do labs before visit in May - Do virtual before PX to discuss labs?    She has not been seen in person with your since 2021    Kristel Lubin RN

## 2025-02-17 ENCOUNTER — E-VISIT (OUTPATIENT)
Dept: FAMILY MEDICINE | Facility: CLINIC | Age: 55
End: 2025-02-17
Payer: COMMERCIAL

## 2025-02-17 DIAGNOSIS — M62.830 BACK MUSCLE SPASM: Primary | ICD-10-CM

## 2025-02-18 RX ORDER — CYCLOBENZAPRINE HCL 10 MG
10 TABLET ORAL 3 TIMES DAILY PRN
Qty: 20 TABLET | Refills: 0 | Status: SHIPPED | OUTPATIENT
Start: 2025-02-18

## 2025-02-18 ASSESSMENT — PATIENT HEALTH QUESTIONNAIRE - PHQ9
SUM OF ALL RESPONSES TO PHQ QUESTIONS 1-9: 0
SUM OF ALL RESPONSES TO PHQ QUESTIONS 1-9: 0
10. IF YOU CHECKED OFF ANY PROBLEMS, HOW DIFFICULT HAVE THESE PROBLEMS MADE IT FOR YOU TO DO YOUR WORK, TAKE CARE OF THINGS AT HOME, OR GET ALONG WITH OTHER PEOPLE: NOT DIFFICULT AT ALL

## 2025-02-18 ASSESSMENT — ANXIETY QUESTIONNAIRES
IF YOU CHECKED OFF ANY PROBLEMS ON THIS QUESTIONNAIRE, HOW DIFFICULT HAVE THESE PROBLEMS MADE IT FOR YOU TO DO YOUR WORK, TAKE CARE OF THINGS AT HOME, OR GET ALONG WITH OTHER PEOPLE: NOT DIFFICULT AT ALL
1. FEELING NERVOUS, ANXIOUS, OR ON EDGE: NOT AT ALL
7. FEELING AFRAID AS IF SOMETHING AWFUL MIGHT HAPPEN: NOT AT ALL
GAD7 TOTAL SCORE: 0
7. FEELING AFRAID AS IF SOMETHING AWFUL MIGHT HAPPEN: NOT AT ALL
5. BEING SO RESTLESS THAT IT IS HARD TO SIT STILL: NOT AT ALL
8. IF YOU CHECKED OFF ANY PROBLEMS, HOW DIFFICULT HAVE THESE MADE IT FOR YOU TO DO YOUR WORK, TAKE CARE OF THINGS AT HOME, OR GET ALONG WITH OTHER PEOPLE?: NOT DIFFICULT AT ALL
GAD7 TOTAL SCORE: 0
3. WORRYING TOO MUCH ABOUT DIFFERENT THINGS: NOT AT ALL
GAD7 TOTAL SCORE: 0
6. BECOMING EASILY ANNOYED OR IRRITABLE: NOT AT ALL
4. TROUBLE RELAXING: NOT AT ALL
2. NOT BEING ABLE TO STOP OR CONTROL WORRYING: NOT AT ALL

## 2025-02-20 ENCOUNTER — VIRTUAL VISIT (OUTPATIENT)
Dept: FAMILY MEDICINE | Facility: CLINIC | Age: 55
End: 2025-02-20
Payer: COMMERCIAL

## 2025-02-20 DIAGNOSIS — E89.0 POSTABLATIVE HYPOTHYROIDISM: ICD-10-CM

## 2025-02-20 DIAGNOSIS — I10 BENIGN ESSENTIAL HYPERTENSION: ICD-10-CM

## 2025-02-20 DIAGNOSIS — D50.0 IRON DEFICIENCY ANEMIA DUE TO CHRONIC BLOOD LOSS: Primary | ICD-10-CM

## 2025-02-20 DIAGNOSIS — E66.01 MORBID OBESITY (H): ICD-10-CM

## 2025-02-20 DIAGNOSIS — N95.1 PERIMENOPAUSE: ICD-10-CM

## 2025-02-20 DIAGNOSIS — F41.1 GAD (GENERALIZED ANXIETY DISORDER): ICD-10-CM

## 2025-02-20 DIAGNOSIS — I48.19 PERSISTENT ATRIAL FIBRILLATION (H): ICD-10-CM

## 2025-02-20 RX ORDER — ATENOLOL 50 MG/1
50 TABLET ORAL DAILY
Qty: 90 TABLET | Refills: 0 | Status: SHIPPED | OUTPATIENT
Start: 2025-02-20

## 2025-02-20 RX ORDER — LEVOTHYROXINE SODIUM 200 UG/1
200 TABLET ORAL DAILY
Qty: 90 TABLET | Refills: 0 | Status: SHIPPED | OUTPATIENT
Start: 2025-02-20

## 2025-02-20 RX ORDER — ESCITALOPRAM OXALATE 5 MG/1
5 TABLET ORAL DAILY
Qty: 90 TABLET | Refills: 0 | Status: SHIPPED | OUTPATIENT
Start: 2025-02-20

## 2025-02-20 RX ORDER — TRIAMTERENE AND HYDROCHLOROTHIAZIDE 37.5; 25 MG/1; MG/1
1 TABLET ORAL DAILY
Qty: 90 TABLET | Refills: 0 | Status: SHIPPED | OUTPATIENT
Start: 2025-02-20

## 2025-02-20 NOTE — PROGRESS NOTES
"Makenzie is a 55 year old who is being evaluated via a billable video visit.    What phone number would you like to be contacted at? [phone]  How would you like to obtain your AVS? MyChart      Assessment & Plan     Iron deficiency anemia due to chronic blood loss - still having menstrual periods - usually a few months apart - heavy bleeding for a few days. Started intermittently taking since her labs last year. Will run baseline labs - advised daily iron as long as she is still menstruating. Will recheck labs at her CPE in May 2025. Reports overall feeling well, somewhat run down at the time of menses.   - CBC with platelets; Future  - Iron and iron binding capacity; Future    Perimenopause - irregular periods - lasting 5-7 days, 3 days of heavy bleeding. Given she is 55, she is a bit tired of this but willing to continue on until natural menopause for now. Reviewed HRT treatment options in case she changes her mind. She would opt for oral or transdermal.     Persistent atrial fibrillation (H) - following with cardiology    Morbid obesity (H)    Benign essential hypertension  - atenolol (TENORMIN) 50 MG tablet; Take 1 tablet (50 mg) by mouth daily.  - triamterene-HCTZ (MAXZIDE-25) 37.5-25 MG tablet; Take 1 tablet by mouth daily.    DANISHA (generalized anxiety disorder)  - escitalopram (LEXAPRO) 5 MG tablet; Take 1 tablet (5 mg) by mouth daily.    Postablative hypothyroidism  - levothyroxine (SYNTHROID/LEVOTHROID) 200 MCG tablet; Take 1 tablet (200 mcg) by mouth daily.    The longitudinal plan of care for the diagnosis(es)/condition(s) as documented were addressed during this visit. Due to the added complexity in care, I will continue to support Makenzie in the subsequent management and with ongoing continuity of care.    BMI  Estimated body mass index is 66.25 kg/m  as calculated from the following:    Height as of 3/6/24: 1.6 m (5' 3\").    Weight as of 3/6/24: 169.6 kg (374 lb).       Subjective   Makenzie is a 55 " "year old, presenting for the following health issues:  Follow Up (Patient was seen by the Cardiologist (Perlita Gar) and had some abnormal labs that she was to discuss with her PCP.  Has not done that yet and is concerned about it. )        2/20/2025     8:15 AM   Additional Questions   Roomed by Lee Ann Maza CMA   Accompanied by Self     History of Present Illness       Reason for visit:  Discuss bloodwork and get lab appt    She eats 2-3 servings of fruits and vegetables daily.She consumes 0 sweetened beverage(s) daily.She exercises with enough effort to increase her heart rate 10 to 19 minutes per day.  She exercises with enough effort to increase her heart rate 3 or less days per week.   She is taking medications regularly.       Medication Follow Up and Lab Result Review  Taking Medication as prescribed: yes  Side Effects:  None  Medication Helping Symptoms:  yes        Review of Systems  Constitutional, HEENT, cardiovascular, pulmonary, gi and gu systems are negative, except as otherwise noted.      Objective    Vitals - Patient Reported  Systolic (Patient Reported): 122  Diastolic (Patient Reported): 33  Weight (Patient Reported): 171.5 kg (378 lb)  Height (Patient Reported): 165.1 cm (5' 5\")  BMI (Based on Pt Reported Ht/Wt): 62.9  SpO2 (Patient Reported): 96  Pulse (Patient Reported): 64      Vitals:  No vitals were obtained today due to virtual visit.    Physical Exam   GENERAL: alert and no distress  EYES: Eyes grossly normal to inspection.  No discharge or erythema, or obvious scleral/conjunctival abnormalities.  RESP: No audible wheeze, cough, or visible cyanosis.    SKIN: Visible skin clear. No significant rash, abnormal pigmentation or lesions.  NEURO: Cranial nerves grossly intact.  Mentation and speech appropriate for age.  PSYCH: Appropriate affect, tone, and pace of words        Video-Visit Details    Type of service:  Video Visit   Originating Location (pt. Location): Home    Distant Location " (provider location):  Off-site  Platform used for Video Visit: [employer]  Signed Electronically by: Ariana Padilla MD

## 2025-02-25 ENCOUNTER — MYC MEDICAL ADVICE (OUTPATIENT)
Dept: FAMILY MEDICINE | Facility: CLINIC | Age: 55
End: 2025-02-25
Payer: COMMERCIAL

## 2025-02-25 DIAGNOSIS — Z12.11 SPECIAL SCREENING FOR MALIGNANT NEOPLASMS, COLON: Primary | ICD-10-CM

## 2025-02-26 NOTE — TELEPHONE ENCOUNTER
Please see MyChart. Pt requesting new order for Cologuard.    Order pended.    Cassidy ALEXIS, RN, PHN

## 2025-02-27 ENCOUNTER — ORDERS ONLY (AUTO-RELEASED) (OUTPATIENT)
Dept: FAMILY MEDICINE | Facility: CLINIC | Age: 55
End: 2025-02-27
Payer: COMMERCIAL

## 2025-02-27 DIAGNOSIS — Z12.11 SPECIAL SCREENING FOR MALIGNANT NEOPLASMS, COLON: ICD-10-CM

## 2025-04-07 DIAGNOSIS — I48.11 LONGSTANDING PERSISTENT ATRIAL FIBRILLATION (H): ICD-10-CM

## 2025-04-12 ENCOUNTER — ANCILLARY PROCEDURE (OUTPATIENT)
Dept: MAMMOGRAPHY | Facility: CLINIC | Age: 55
End: 2025-04-12
Attending: FAMILY MEDICINE
Payer: COMMERCIAL

## 2025-04-12 DIAGNOSIS — Z12.31 VISIT FOR SCREENING MAMMOGRAM: ICD-10-CM

## 2025-05-28 ENCOUNTER — TELEPHONE (OUTPATIENT)
Dept: FAMILY MEDICINE | Facility: CLINIC | Age: 55
End: 2025-05-28
Payer: COMMERCIAL

## 2025-05-28 NOTE — TELEPHONE ENCOUNTER
Patient Quality Outreach    Patient is due for the following:   Physical Preventive Adult Physical    Action(s) Taken:   No follow up needed at this time.    Type of outreach:    None. Patient has px scheduled. Will advise of HM due at that time.     Questions for provider review:    None         Raffi Sherman CMA  Chart routed to None.

## 2025-06-09 DIAGNOSIS — E89.0 POSTABLATIVE HYPOTHYROIDISM: ICD-10-CM

## 2025-06-09 RX ORDER — LEVOTHYROXINE SODIUM 200 UG/1
200 TABLET ORAL DAILY
Qty: 30 TABLET | Refills: 0 | Status: SHIPPED | OUTPATIENT
Start: 2025-06-09

## 2025-06-11 ENCOUNTER — E-VISIT (OUTPATIENT)
Dept: FAMILY MEDICINE | Facility: CLINIC | Age: 55
End: 2025-06-11
Payer: COMMERCIAL

## 2025-06-11 DIAGNOSIS — N93.9 ABNORMAL UTERINE BLEEDING: Primary | ICD-10-CM

## 2025-06-12 RX ORDER — MEDROXYPROGESTERONE ACETATE 5 MG
5 TABLET ORAL DAILY
Qty: 30 TABLET | Refills: 2 | Status: SHIPPED | OUTPATIENT
Start: 2025-06-12

## 2025-06-14 ENCOUNTER — HEALTH MAINTENANCE LETTER (OUTPATIENT)
Age: 55
End: 2025-06-14

## 2025-06-30 ENCOUNTER — LAB (OUTPATIENT)
Dept: LAB | Facility: CLINIC | Age: 55
End: 2025-06-30
Payer: COMMERCIAL

## 2025-06-30 ENCOUNTER — RESULTS FOLLOW-UP (OUTPATIENT)
Dept: FAMILY MEDICINE | Facility: CLINIC | Age: 55
End: 2025-06-30

## 2025-06-30 DIAGNOSIS — N92.1 MENORRHAGIA WITH IRREGULAR CYCLE: ICD-10-CM

## 2025-06-30 DIAGNOSIS — D50.0 IRON DEFICIENCY ANEMIA DUE TO CHRONIC BLOOD LOSS: Primary | ICD-10-CM

## 2025-06-30 DIAGNOSIS — E89.0 POSTABLATIVE HYPOTHYROIDISM: Primary | ICD-10-CM

## 2025-06-30 DIAGNOSIS — I10 BENIGN ESSENTIAL HYPERTENSION: ICD-10-CM

## 2025-06-30 DIAGNOSIS — D50.0 IRON DEFICIENCY ANEMIA DUE TO CHRONIC BLOOD LOSS: ICD-10-CM

## 2025-06-30 DIAGNOSIS — R73.9 ELEVATED BLOOD SUGAR: ICD-10-CM

## 2025-06-30 LAB
ANION GAP SERPL CALCULATED.3IONS-SCNC: 13 MMOL/L (ref 7–15)
BUN SERPL-MCNC: 13.8 MG/DL (ref 6–20)
CALCIUM SERPL-MCNC: 9.6 MG/DL (ref 8.8–10.4)
CHLORIDE SERPL-SCNC: 101 MMOL/L (ref 98–107)
CREAT SERPL-MCNC: 0.81 MG/DL (ref 0.51–0.95)
EGFRCR SERPLBLD CKD-EPI 2021: 85 ML/MIN/1.73M2
ERYTHROCYTE [DISTWIDTH] IN BLOOD BY AUTOMATED COUNT: 16.3 % (ref 10–15)
GLUCOSE SERPL-MCNC: 113 MG/DL (ref 70–99)
HCO3 SERPL-SCNC: 24 MMOL/L (ref 22–29)
HCT VFR BLD AUTO: 30.6 % (ref 35–47)
HGB BLD-MCNC: 9.2 G/DL (ref 11.7–15.7)
IRON BINDING CAPACITY (ROCHE): 353 UG/DL (ref 240–430)
IRON SATN MFR SERPL: 6 % (ref 15–46)
IRON SERPL-MCNC: 20 UG/DL (ref 37–145)
MCH RBC QN AUTO: 23.6 PG (ref 26.5–33)
MCHC RBC AUTO-ENTMCNC: 30.1 G/DL (ref 31.5–36.5)
MCV RBC AUTO: 79 FL (ref 78–100)
PLATELET # BLD AUTO: 479 10E3/UL (ref 150–450)
POTASSIUM SERPL-SCNC: 4.3 MMOL/L (ref 3.4–5.3)
RBC # BLD AUTO: 3.9 10E6/UL (ref 3.8–5.2)
SODIUM SERPL-SCNC: 138 MMOL/L (ref 135–145)
TSH SERPL DL<=0.005 MIU/L-ACNC: 2.63 UIU/ML (ref 0.3–4.2)
WBC # BLD AUTO: 11.1 10E3/UL (ref 4–11)

## 2025-06-30 PROCEDURE — 85027 COMPLETE CBC AUTOMATED: CPT

## 2025-06-30 PROCEDURE — 83550 IRON BINDING TEST: CPT

## 2025-06-30 PROCEDURE — 84443 ASSAY THYROID STIM HORMONE: CPT

## 2025-06-30 PROCEDURE — 80048 BASIC METABOLIC PNL TOTAL CA: CPT

## 2025-06-30 PROCEDURE — 36415 COLL VENOUS BLD VENIPUNCTURE: CPT

## 2025-06-30 PROCEDURE — 83540 ASSAY OF IRON: CPT

## 2025-07-01 RX ORDER — MEDROXYPROGESTERONE ACETATE 10 MG
10 TABLET ORAL DAILY
Qty: 90 TABLET | Refills: 0 | Status: SHIPPED | OUTPATIENT
Start: 2025-07-01

## 2025-07-07 ENCOUNTER — TELEPHONE (OUTPATIENT)
Dept: FAMILY MEDICINE | Facility: CLINIC | Age: 55
End: 2025-07-07
Payer: COMMERCIAL

## 2025-07-07 NOTE — TELEPHONE ENCOUNTER
Summary:    Patient is due/failing the following:   MAMMOGRAM    Reviewed:    [] CARE EVERYWHERE  [] LAST OV NOTE   [] FYI TAB  [] MYCHART ACTIVE?  [] LAST PANEL ENCOUNTER  [] FUTURE APPTS  [] IMMUNIZATIONS  [] Media Tab            Action needed:   Patient needs referral/order: mammo    Type of outreach:    Phone, spoke to patient.  She states will schedule appt soon                                                                               Gabby Oneill/LIUZA  [facility]

## 2025-07-08 ENCOUNTER — MYC MEDICAL ADVICE (OUTPATIENT)
Dept: CARDIOLOGY | Facility: CLINIC | Age: 55
End: 2025-07-08
Payer: COMMERCIAL

## 2025-07-08 NOTE — TELEPHONE ENCOUNTER
Yes, she has a relatively low NAC0PS1-IGEk score of 2 (for HTN and gender), and if she is having issues with significant bleeding we should stop the xarelto. When she turns 64 and her [id number] score increases to 3, she should be referred for watchman.     Thanks.

## 2025-07-09 ENCOUNTER — MYC REFILL (OUTPATIENT)
Dept: FAMILY MEDICINE | Facility: CLINIC | Age: 55
End: 2025-07-09
Payer: COMMERCIAL

## 2025-07-09 DIAGNOSIS — E89.0 POSTABLATIVE HYPOTHYROIDISM: ICD-10-CM

## 2025-07-09 RX ORDER — LEVOTHYROXINE SODIUM 200 UG/1
200 TABLET ORAL DAILY
Qty: 30 TABLET | Refills: 11 | Status: SHIPPED | OUTPATIENT
Start: 2025-07-09

## 2025-07-14 DIAGNOSIS — F41.1 GAD (GENERALIZED ANXIETY DISORDER): ICD-10-CM

## 2025-07-14 DIAGNOSIS — I10 BENIGN ESSENTIAL HYPERTENSION: ICD-10-CM

## 2025-07-14 RX ORDER — ATENOLOL 50 MG/1
50 TABLET ORAL DAILY
Qty: 90 TABLET | Refills: 0 | Status: SHIPPED | OUTPATIENT
Start: 2025-07-14

## 2025-07-14 RX ORDER — TRIAMTERENE AND HYDROCHLOROTHIAZIDE 37.5; 25 MG/1; MG/1
1 TABLET ORAL DAILY
Qty: 90 TABLET | Refills: 0 | Status: SHIPPED | OUTPATIENT
Start: 2025-07-14

## 2025-07-14 RX ORDER — ESCITALOPRAM OXALATE 5 MG/1
5 TABLET ORAL DAILY
Qty: 90 TABLET | Refills: 0 | Status: SHIPPED | OUTPATIENT
Start: 2025-07-14

## 2025-07-26 ENCOUNTER — E-VISIT (OUTPATIENT)
Dept: URGENT CARE | Facility: CLINIC | Age: 55
End: 2025-07-26
Payer: COMMERCIAL

## 2025-07-26 DIAGNOSIS — R35.0 URINARY FREQUENCY: Primary | ICD-10-CM

## 2025-07-26 NOTE — PATIENT INSTRUCTIONS
Deawyatt Banegas,     After reviewing your responses, I would like you to come in for a urine test to make sure we treat you correctly. This urine test is to evaluate you for a possible urinary tract infection, and should be completed today or tomorrow. Schedule a Lab Only appointment here. Or you can walk into any open [facility] clinic today (on weekends is mainly the urgent care locations) without an appointment and tell the  you have a urine test ordered and the lab will collect the urine sample.    If a Lab appointment is not available, please check this site for lab locations and hours (many labs are closed evenings and weekends). You may walk into any Lab location during their operating hours without an appointment to complete your urine test. Please let the lab staff know that you have had an eVisit and your provider has ordered a urine test.     You will receive instructions with your results in [facility] once they are available.     If your symptoms worsen, you develop pain in your back or stomach, develop fevers, or are not improving in 5 days, please contact your primary care provider for an appointment or visit a Walk-in or Urgent Care Center to be seen.     Thanks again for choosing us as your health care partner,     Pauly Colon MD

## (undated) DEVICE — ESU CORD MONOPOLAR 10'  E0510

## (undated) DEVICE — ENDO CANNULA 05MM VERSAONE UNIVERSAL UNVCA5STF

## (undated) DEVICE — GLOVE PROTEXIS POWDER FREE 7.5 ORTHOPEDIC 2D73ET75

## (undated) DEVICE — BLADE KNIFE SURG 11 371111

## (undated) DEVICE — GLOVE PROTEXIS POWDER FREE SMT 6.5  2D72PT65X

## (undated) DEVICE — LINEN POUCH DBL 5427

## (undated) DEVICE — SYR 50ML LL W/O NDL 309653

## (undated) DEVICE — LINEN FULL SHEET 5511

## (undated) DEVICE — SUCTION CANISTER MEDIVAC LINER 3000ML W/LID 65651-530

## (undated) DEVICE — DRSG STERI STRIP 1/2X4" R1547

## (undated) DEVICE — GOWN XLG DISP 9545

## (undated) DEVICE — CLIP APPLIER ENDO 05MM MED/LG 176630

## (undated) DEVICE — ENDO DISSECTOR KITTNER 05MM 28-0804

## (undated) DEVICE — ENDO TROCAR 05MM VERSAONE BLADELESS W/STD FIX CAN NONB5STF

## (undated) DEVICE — NDL SPINAL 18GA 3.5" 405184

## (undated) DEVICE — ENDO TROCAR BLUNT 100MM W/THRD ANCHOR BLUNTPORT BPT12STS

## (undated) DEVICE — BAG CLEAR TRASH 1.3M 39X33" P4040C

## (undated) DEVICE — PREP CHLORAPREP 26ML TINTED ORANGE  260815

## (undated) DEVICE — SU VICRYL 0 UR-6 27" J603H

## (undated) DEVICE — ESU GROUND PAD ADULT W/CORD E7507

## (undated) DEVICE — SUCTION IRR STRYKERFLOW II W/TIP 250-070-520

## (undated) DEVICE — SU VICRYL 4-0 PS-2 18" UND J496H

## (undated) DEVICE — SU PDS II 0 ENDOLOOP EZ10G

## (undated) DEVICE — GLOVE PROTEXIS BLUE W/NEU-THERA 8.0  2D73EB80

## (undated) DEVICE — SOL NACL 0.9% IRRIG 3000ML BAG 2B7477

## (undated) DEVICE — LINEN TOWEL PACK X10 5473

## (undated) DEVICE — SU VICRYL 0 ENDOLOOP 18" EJ10

## (undated) DEVICE — Device

## (undated) DEVICE — LINEN HALF SHEET 5512

## (undated) DEVICE — ENDO POUCH GOLD 10MM ECATCH 173050G

## (undated) DEVICE — SU DERMABOND MINI DHVM12

## (undated) RX ORDER — KETOROLAC TROMETHAMINE 30 MG/ML
INJECTION, SOLUTION INTRAMUSCULAR; INTRAVENOUS
Status: DISPENSED
Start: 2017-12-13

## (undated) RX ORDER — PROPOFOL 10 MG/ML
INJECTION, EMULSION INTRAVENOUS
Status: DISPENSED
Start: 2017-12-13

## (undated) RX ORDER — NEOSTIGMINE METHYLSULFATE 1 MG/ML
VIAL (ML) INJECTION
Status: DISPENSED
Start: 2017-12-13

## (undated) RX ORDER — FENTANYL CITRATE 50 UG/ML
INJECTION, SOLUTION INTRAMUSCULAR; INTRAVENOUS
Status: DISPENSED
Start: 2017-12-13

## (undated) RX ORDER — ONDANSETRON 2 MG/ML
INJECTION INTRAMUSCULAR; INTRAVENOUS
Status: DISPENSED
Start: 2017-12-13

## (undated) RX ORDER — CEFAZOLIN SODIUM 1 G/50ML
SOLUTION INTRAVENOUS
Status: DISPENSED
Start: 2017-12-13

## (undated) RX ORDER — PHENYLEPHRINE HCL IN 0.9% NACL 1 MG/10 ML
SYRINGE (ML) INTRAVENOUS
Status: DISPENSED
Start: 2017-12-13

## (undated) RX ORDER — LIDOCAINE HYDROCHLORIDE 10 MG/ML
INJECTION, SOLUTION EPIDURAL; INFILTRATION; INTRACAUDAL; PERINEURAL
Status: DISPENSED
Start: 2017-12-13

## (undated) RX ORDER — BUPIVACAINE HYDROCHLORIDE 5 MG/ML
INJECTION, SOLUTION EPIDURAL; INTRACAUDAL
Status: DISPENSED
Start: 2017-12-13

## (undated) RX ORDER — GLYCOPYRROLATE 0.2 MG/ML
INJECTION INTRAMUSCULAR; INTRAVENOUS
Status: DISPENSED
Start: 2017-12-13

## (undated) RX ORDER — HYDROCODONE BITARTRATE AND ACETAMINOPHEN 5; 325 MG/1; MG/1
TABLET ORAL
Status: DISPENSED
Start: 2017-12-13

## (undated) RX ORDER — DEXAMETHASONE SODIUM PHOSPHATE 4 MG/ML
INJECTION, SOLUTION INTRA-ARTICULAR; INTRALESIONAL; INTRAMUSCULAR; INTRAVENOUS; SOFT TISSUE
Status: DISPENSED
Start: 2017-12-13